# Patient Record
Sex: FEMALE | Race: WHITE | Employment: OTHER | URBAN - METROPOLITAN AREA
[De-identification: names, ages, dates, MRNs, and addresses within clinical notes are randomized per-mention and may not be internally consistent; named-entity substitution may affect disease eponyms.]

---

## 2017-10-30 ENCOUNTER — HOSPITAL ENCOUNTER (OUTPATIENT)
Dept: PREADMISSION TESTING | Age: 82
Discharge: HOME | End: 2017-10-30
Payer: MEDICARE

## 2017-10-30 VITALS — HEIGHT: 62 IN | BODY MASS INDEX: 33.13 KG/M2 | WEIGHT: 180 LBS

## 2017-10-30 LAB
ANION GAP SERPL CALC-SCNC: 10 MMOL/L (ref 3–18)
BUN SERPL-MCNC: 24 MG/DL (ref 7–18)
BUN/CREAT SERPL: 23 (ref 12–20)
CALCIUM SERPL-MCNC: 9.4 MG/DL (ref 8.5–10.1)
CHLORIDE SERPL-SCNC: 103 MMOL/L (ref 100–108)
CO2 SERPL-SCNC: 29 MMOL/L (ref 21–32)
CREAT SERPL-MCNC: 1.04 MG/DL (ref 0.6–1.3)
GLUCOSE SERPL-MCNC: 88 MG/DL (ref 74–99)
HBA1C MFR BLD: 6.6 % (ref 4.5–5.6)
HCT VFR BLD AUTO: 39 % (ref 35–45)
HGB BLD-MCNC: 12.4 G/DL (ref 12–16)
POTASSIUM SERPL-SCNC: 5.4 MMOL/L (ref 3.5–5.5)
SODIUM SERPL-SCNC: 142 MMOL/L (ref 136–145)

## 2017-10-30 PROCEDURE — 93005 ELECTROCARDIOGRAM TRACING: CPT

## 2017-10-30 PROCEDURE — 83036 HEMOGLOBIN GLYCOSYLATED A1C: CPT | Performed by: UROLOGY

## 2017-10-30 PROCEDURE — 85018 HEMOGLOBIN: CPT | Performed by: UROLOGY

## 2017-10-30 PROCEDURE — 80048 BASIC METABOLIC PNL TOTAL CA: CPT | Performed by: UROLOGY

## 2017-10-30 RX ORDER — CEFAZOLIN SODIUM 2 G/50ML
2 SOLUTION INTRAVENOUS ONCE
Status: CANCELLED | OUTPATIENT
Start: 2017-10-30 | End: 2017-10-30

## 2017-10-30 RX ORDER — SODIUM CHLORIDE, SODIUM LACTATE, POTASSIUM CHLORIDE, CALCIUM CHLORIDE 600; 310; 30; 20 MG/100ML; MG/100ML; MG/100ML; MG/100ML
125 INJECTION, SOLUTION INTRAVENOUS CONTINUOUS
Status: CANCELLED | OUTPATIENT
Start: 2017-10-30

## 2017-10-30 RX ORDER — LEVOTHYROXINE SODIUM 100 UG/1
100 TABLET ORAL
COMMUNITY

## 2017-10-30 RX ORDER — CALCIUM CARB/VITAMIN D3/VIT K1 500-500-40
1 TABLET,CHEWABLE ORAL 2 TIMES DAILY
COMMUNITY

## 2017-10-30 NOTE — PERIOP NOTES
Pt has sleep apnea and uses a CPAP. Instructed to bring in DOS. No removable prosthetic devices. Kwame kit given to patient with instructions. Reviewed Kwame wipes. No family history of MH. Pt does not meet criteria for special populations. Pt has alzheimers dementia.

## 2017-11-02 LAB
ATRIAL RATE: 58 BPM
CALCULATED P AXIS, ECG09: 48 DEGREES
CALCULATED R AXIS, ECG10: 24 DEGREES
CALCULATED T AXIS, ECG11: 72 DEGREES
DIAGNOSIS, 93000: NORMAL
P-R INTERVAL, ECG05: 196 MS
Q-T INTERVAL, ECG07: 442 MS
QRS DURATION, ECG06: 96 MS
QTC CALCULATION (BEZET), ECG08: 433 MS
VENTRICULAR RATE, ECG03: 58 BPM

## 2017-11-08 ENCOUNTER — ANESTHESIA EVENT (OUTPATIENT)
Dept: SURGERY | Age: 82
End: 2017-11-08
Payer: MEDICARE

## 2017-11-08 RX ORDER — FLUMAZENIL 0.1 MG/ML
0.2 INJECTION INTRAVENOUS
Status: CANCELLED | OUTPATIENT
Start: 2017-11-08

## 2017-11-08 RX ORDER — MAGNESIUM SULFATE 100 %
4 CRYSTALS MISCELLANEOUS AS NEEDED
Status: CANCELLED | OUTPATIENT
Start: 2017-11-08

## 2017-11-08 RX ORDER — NALOXONE HYDROCHLORIDE 0.4 MG/ML
0.1 INJECTION, SOLUTION INTRAMUSCULAR; INTRAVENOUS; SUBCUTANEOUS AS NEEDED
Status: CANCELLED | OUTPATIENT
Start: 2017-11-08

## 2017-11-08 RX ORDER — HYDROMORPHONE HYDROCHLORIDE 2 MG/ML
0.5 INJECTION, SOLUTION INTRAMUSCULAR; INTRAVENOUS; SUBCUTANEOUS
Status: CANCELLED | OUTPATIENT
Start: 2017-11-08

## 2017-11-08 RX ORDER — SODIUM CHLORIDE 0.9 % (FLUSH) 0.9 %
5-10 SYRINGE (ML) INJECTION AS NEEDED
Status: CANCELLED | OUTPATIENT
Start: 2017-11-08

## 2017-11-08 RX ORDER — DEXTROSE 50 % IN WATER (D50W) INTRAVENOUS SYRINGE
25-50 AS NEEDED
Status: CANCELLED | OUTPATIENT
Start: 2017-11-08

## 2017-11-08 RX ORDER — FENTANYL CITRATE 50 UG/ML
50 INJECTION, SOLUTION INTRAMUSCULAR; INTRAVENOUS AS NEEDED
Status: CANCELLED | OUTPATIENT
Start: 2017-11-08 | End: 2017-11-12

## 2017-11-09 ENCOUNTER — HOSPITAL ENCOUNTER (OUTPATIENT)
Age: 82
Setting detail: OUTPATIENT SURGERY
Discharge: HOME | End: 2017-11-09
Attending: UROLOGY | Admitting: UROLOGY
Payer: MEDICARE

## 2017-11-09 ENCOUNTER — APPOINTMENT (OUTPATIENT)
Dept: GENERAL RADIOLOGY | Age: 82
End: 2017-11-09
Attending: UROLOGY
Payer: MEDICARE

## 2017-11-09 ENCOUNTER — ANESTHESIA (OUTPATIENT)
Dept: SURGERY | Age: 82
End: 2017-11-09
Payer: MEDICARE

## 2017-11-09 VITALS
TEMPERATURE: 98.5 F | DIASTOLIC BLOOD PRESSURE: 53 MMHG | RESPIRATION RATE: 16 BRPM | HEART RATE: 50 BPM | OXYGEN SATURATION: 97 % | HEIGHT: 62 IN | BODY MASS INDEX: 33.13 KG/M2 | SYSTOLIC BLOOD PRESSURE: 126 MMHG | WEIGHT: 180.06 LBS

## 2017-11-09 LAB — GLUCOSE BLD STRIP.AUTO-MCNC: 110 MG/DL (ref 70–110)

## 2017-11-09 PROCEDURE — 82962 GLUCOSE BLOOD TEST: CPT

## 2017-11-09 PROCEDURE — 76060000034 HC ANESTHESIA 1.5 TO 2 HR: Performed by: UROLOGY

## 2017-11-09 PROCEDURE — C1894 INTRO/SHEATH, NON-LASER: HCPCS | Performed by: UROLOGY

## 2017-11-09 PROCEDURE — 77030010507 HC ADH SKN DERMBND J&J -B: Performed by: UROLOGY

## 2017-11-09 PROCEDURE — 77030012020 HC ANTENA NEURSTM MEDT -B: Performed by: UROLOGY

## 2017-11-09 PROCEDURE — 76010000153 HC OR TIME 1.5 TO 2 HR: Performed by: UROLOGY

## 2017-11-09 PROCEDURE — 74011250636 HC RX REV CODE- 250/636: Performed by: UROLOGY

## 2017-11-09 PROCEDURE — 74011000250 HC RX REV CODE- 250

## 2017-11-09 PROCEDURE — 74011250636 HC RX REV CODE- 250/636

## 2017-11-09 PROCEDURE — 77030018842 HC SOL IRR SOD CL 9% BAXT -A: Performed by: UROLOGY

## 2017-11-09 PROCEDURE — C1787 PATIENT PROGR, NEUROSTIM: HCPCS | Performed by: UROLOGY

## 2017-11-09 PROCEDURE — C1778 LEAD, NEUROSTIMULATOR: HCPCS | Performed by: UROLOGY

## 2017-11-09 PROCEDURE — 77030031139 HC SUT VCRL2 J&J -A: Performed by: UROLOGY

## 2017-11-09 PROCEDURE — 77030020782 HC GWN BAIR PAWS FLX 3M -B: Performed by: UROLOGY

## 2017-11-09 PROCEDURE — 76210000021 HC REC RM PH II 0.5 TO 1 HR: Performed by: UROLOGY

## 2017-11-09 PROCEDURE — 74011000250 HC RX REV CODE- 250: Performed by: UROLOGY

## 2017-11-09 PROCEDURE — C1767 GENERATOR, NEURO NON-RECHARG: HCPCS | Performed by: UROLOGY

## 2017-11-09 PROCEDURE — 77030011640 HC PAD GRND REM COVD -A: Performed by: UROLOGY

## 2017-11-09 DEVICE — GENERATOR INTERSTIM X --: Type: IMPLANTABLE DEVICE | Site: BUTTOCKS | Status: FUNCTIONAL

## 2017-11-09 DEVICE — LEAD KT STIM INTERSTIM 28CM --: Type: IMPLANTABLE DEVICE | Site: BUTTOCKS | Status: FUNCTIONAL

## 2017-11-09 RX ORDER — SODIUM CHLORIDE, SODIUM LACTATE, POTASSIUM CHLORIDE, CALCIUM CHLORIDE 600; 310; 30; 20 MG/100ML; MG/100ML; MG/100ML; MG/100ML
125 INJECTION, SOLUTION INTRAVENOUS CONTINUOUS
Status: DISCONTINUED | OUTPATIENT
Start: 2017-11-09 | End: 2017-11-09 | Stop reason: HOSPADM

## 2017-11-09 RX ORDER — PROPOFOL 10 MG/ML
INJECTION, EMULSION INTRAVENOUS
Status: DISCONTINUED | OUTPATIENT
Start: 2017-11-09 | End: 2017-11-09 | Stop reason: HOSPADM

## 2017-11-09 RX ORDER — SODIUM CHLORIDE 0.9 % (FLUSH) 0.9 %
5-10 SYRINGE (ML) INJECTION AS NEEDED
Status: CANCELLED | OUTPATIENT
Start: 2017-11-09

## 2017-11-09 RX ORDER — LIDOCAINE HYDROCHLORIDE 20 MG/ML
INJECTION, SOLUTION EPIDURAL; INFILTRATION; INTRACAUDAL; PERINEURAL AS NEEDED
Status: DISCONTINUED | OUTPATIENT
Start: 2017-11-09 | End: 2017-11-09 | Stop reason: HOSPADM

## 2017-11-09 RX ORDER — FENTANYL CITRATE 50 UG/ML
INJECTION, SOLUTION INTRAMUSCULAR; INTRAVENOUS AS NEEDED
Status: DISCONTINUED | OUTPATIENT
Start: 2017-11-09 | End: 2017-11-09 | Stop reason: HOSPADM

## 2017-11-09 RX ORDER — SODIUM CHLORIDE 0.9 % (FLUSH) 0.9 %
5-10 SYRINGE (ML) INJECTION EVERY 8 HOURS
Status: CANCELLED | OUTPATIENT
Start: 2017-11-09

## 2017-11-09 RX ORDER — CEFAZOLIN SODIUM 2 G/50ML
2 SOLUTION INTRAVENOUS ONCE
Status: COMPLETED | OUTPATIENT
Start: 2017-11-09 | End: 2017-11-09

## 2017-11-09 RX ORDER — MIDAZOLAM HYDROCHLORIDE 1 MG/ML
INJECTION, SOLUTION INTRAMUSCULAR; INTRAVENOUS AS NEEDED
Status: DISCONTINUED | OUTPATIENT
Start: 2017-11-09 | End: 2017-11-09 | Stop reason: HOSPADM

## 2017-11-09 RX ADMIN — PROPOFOL 25 MCG/KG/MIN: 10 INJECTION, EMULSION INTRAVENOUS at 07:38

## 2017-11-09 RX ADMIN — FENTANYL CITRATE 25 MCG: 50 INJECTION, SOLUTION INTRAMUSCULAR; INTRAVENOUS at 08:45

## 2017-11-09 RX ADMIN — SODIUM CHLORIDE, SODIUM LACTATE, POTASSIUM CHLORIDE, AND CALCIUM CHLORIDE 125 ML/HR: 600; 310; 30; 20 INJECTION, SOLUTION INTRAVENOUS at 06:08

## 2017-11-09 RX ADMIN — FENTANYL CITRATE 25 MCG: 50 INJECTION, SOLUTION INTRAMUSCULAR; INTRAVENOUS at 08:14

## 2017-11-09 RX ADMIN — MIDAZOLAM HYDROCHLORIDE 0.5 MG: 1 INJECTION, SOLUTION INTRAMUSCULAR; INTRAVENOUS at 08:43

## 2017-11-09 RX ADMIN — FENTANYL CITRATE 25 MCG: 50 INJECTION, SOLUTION INTRAMUSCULAR; INTRAVENOUS at 07:47

## 2017-11-09 RX ADMIN — MIDAZOLAM HYDROCHLORIDE 1 MG: 1 INJECTION, SOLUTION INTRAMUSCULAR; INTRAVENOUS at 07:38

## 2017-11-09 RX ADMIN — MIDAZOLAM HYDROCHLORIDE 0.5 MG: 1 INJECTION, SOLUTION INTRAMUSCULAR; INTRAVENOUS at 08:00

## 2017-11-09 RX ADMIN — FENTANYL CITRATE 25 MCG: 50 INJECTION, SOLUTION INTRAMUSCULAR; INTRAVENOUS at 08:43

## 2017-11-09 RX ADMIN — LIDOCAINE HYDROCHLORIDE 40 MG: 20 INJECTION, SOLUTION EPIDURAL; INFILTRATION; INTRACAUDAL; PERINEURAL at 07:33

## 2017-11-09 RX ADMIN — CEFAZOLIN SODIUM 2 G: 2 SOLUTION INTRAVENOUS at 07:33

## 2017-11-09 NOTE — H&P
A    Urology [address], [facility] Jessica Modi, [address] [place] [address]  phone: [phone]  fax: [phone]          Patient: Bal Flores  YOB: 1935  Date: 11/08/2017 7:10 AM   Visit Type: Chart Update      Assessment/Plan  # Detail Type Description    1. Assessment Urge incontinence (N39.41). Patient Plan   Pt underwent PNE for her urinary difficulties. She had very good results and wants to proceed with placement of Interstim device. All risks including pain infection bleeding reviewed. The need for battery replacement and possible need for lead revision in the future were explained to the patient. She understadns and wishes to proceed         2. Assessment Frequency of micturition (R35.0). 3. Assessment With fecal symptoms (R15.9), chronic. 4. Assessment Nocturnal enuresis (N39.44). This 81year old female presents for Overactive Bladder. History of Present Illness:  1. Overactive Bladder      Onset was gradual. Severity level is mild. It occurs daily. The problem is worse. Associated symptoms include nocturia (2 times per night), urgency and urinary frequency. Pertinent negatives include chills, constipation, fever, hematuria, pelvic pain, pelvic pressure, pressure, suprapubic pain and urinary incontinence. Additional information: Pt w hx of urgency urge incontinence, frequency, nocturia x 2, she uses only 2 depends, she is currently on Myrbetriq 50mg  Sxs have worsened over last few months.  having nocturnal enuresis now,  urge incontinence, and fecal incontinence. I reviewed Interstim as a possiblity since it can control both urine and fecal problems. Patient underwent a peripheral nerve evaluation in September 2017. This was successful and she wants to proceed with placement InterStim device.             Chronic conditions addressed today:  Diagnosis Description Code Status HPI Comments   With fecal symptoms R15.9 PAST MEDICAL/SURGICAL HISTORY   (Reviewed, updated)    Disease/disorder Onset Date Management Date Comments   Fall 2016 Rib fractures     Anemia due to hemorrage blood loss 2015 Blood transfusion       Cataract extraction 2016      Hole in Colon 10/01/2015      Gall Bladder       Appendectomy 1942      Knee replacement       DIAGNOSTICS HISTORY:  Test Ordered Interpretation Result completed   EYE EXAM & TREATMENT  No DM retinopathy See scanned report. 2016     Test Ordered Ordering Comments Modifier   EYE EXAM & TREATMENT          PROBLEM LIST:  Problem Description Onset Date   Sleep apnea    Diabetes    Arthritis    Mumps      Allergies  Ingredient Reaction Medication Name Comment   NO KNOWN ALLERGIES        Family History:  (Reviewed, updated)  Relationship Family Member Name  Age at Death Condition Onset Age Cause of Death       Family history of Diabetes mellitus  N       Family history of Hypertension  N   Father  Y 76 Kidney stones  N   Mother  Y       Spouse  Y 79            Social History:  (Reviewed, updated)  Preferred language is English. The patient does not need an . Smoking status: Never smoker. No passive smoke exposure. ALCOHOL  There is no history of alcohol use. LIFESTYLE  Moderate activity level. Exercises occasionally. Review of Systems  System Neg/Pos Details   Constitutional Negative Chills and fever. GI Negative Constipation.  Positive Nocturia, Urgency, Urinary frequency.  Negative Hematuria, pelvic pain, pelvic pressure, pressure, suprapubic pain and urinary incontinence. Physical Exam  Exam Findings Details   Constitutional Normal Well developed. Neck Exam Normal Inspection - Normal.   Respiratory Normal Inspection - Normal.   Abdomen Normal No abdominal tenderness. Genitourinary * Pelvic deferred. Genitourinary Normal No Suprapubic tenderness. No CVA tenderness.  No Flank mass Psychiatric Normal Oriented to time, place, person and situation. Appropriate mood and affect. Medications (added, continued, or stopped today)  Started Medication Directions Instruction Stopped   08/04/2017 BD Alcohol Swabs USE TO WIPE FINGER WHEN    TESTING BLOOD SUGARS     12/40/6118 Bystolic 10 mg tablet take 1 tablet by oral route  every day     08/04/2017 Calcium 600 600 mg calcium (1,500 mg) tablet take 2 by Oral route  every day     08/04/2017 CPAP  INHALATION GAS ABC     08/04/2017 Crestor 10 mg tablet take 1 tablet by oral route  every day     08/04/2017 escitalopram 10 mg tablet take 1 tablet by oral route  every day     08/04/2017 Exelon Patch 9.5 mg/24 hr transdermal apply 1 patch by transdermal route  every day . Do not apply to same area more than once every 14 days. 08/04/2017 furosemide 20 mg tablet take 1 by Oral route  every day     08/04/2017 Janumet 50 mg-1,000 mg tablet take 1 tablet by oral route 2 times every day with meals     08/04/2017 levothyroxine 100 mcg tablet take 1 tablet by oral route  every day     12/31/2015 Lumigan 0.01 % eye drops 1 drop daily     12/31/2015 MegaRed Omega-3 Krill Oil 1,000 mg-230 mg-60 mg capsule 1 tab po q daily. 08/04/2017 multivitamin capsule take 1 capsule by oral route  every day     08/04/2017 Myrbetriq 50 mg tablet,extended release take 1 tablet by oral route  every day swallowing whole with water. Do not crush, chew and/or divide. 08/04/2017 Namenda 10 mg tablet take 1 by Oral route 2 times every day     10/03/2016 NYSTATIN 328830     POW APPLY TO THE AFFECTED AREA 2 TIMES A DAY     08/04/2017 omeprazole 20 mg capsule,delayed release take 2 capsule by oral route  every day 30 minutes to 1 hour before a meal     08/04/2017 OneTouch Ultra Test strips USE TO TEST BLOOD SUGAR 1 TO 2 TIMES DAILY E11.65    01/14/2016 Refresh Tears 0.5 % eye drops use 1 drop daily     08/04/2017 spironolactone 25 mg tablet take . 5  tablet by ORAL route every day  07/26/2018   10/04/2016 Vitamin B-12 1,000 mcg/mL injection solution inject 1 milliliter by intramuscular route every 2 weeks for 2 months     08/04/2017 Voltaren 1 % topical gel apply (2G)  by topical route 3 times every day to the affected area(s)     Completed by:        Lilia Ventura  11/08/2017 7:13 AM   Document generated by:  Inés Schuster 11/08/2017 07:13 AM      -----------------------------------------------------------------------------------------------------------

## 2017-11-09 NOTE — OP NOTES
OPERATIVE NOTE - First and Second-Stage Interstim II Implantation with Programming    Patient: Nigel Canales MRN: [id number]  SSN: [id number]    YOB: 1935  Age: 81 y.o. Sex: female      Date of Procedure:  11/9/2017   Preoperative Diagnosis:  FREQUENCY OF MICTUITION  Postoperative Diagnosis:  FREQUENCY OF MICTUITION    Procedure:  Procedure(s):  INTERSTIM PLACEMENT STAGE 1 AND 2 W/C-ARM  Surgeon:  Tigre Garcia) and Role:     * Mickey Loredo MD - Primary  Anesthesia:  MAC     Findings:  Urinary frequency    Procedure Details:    After informed consent was obtained, the patient was taken to the operating room, placed in the prone position on the Milind table. The pt was prepped and draped in usual surgical fashion. Tape was placed on each buttock and pulled laterally to help in visualization of the anal sphincter. A  was used to confirm the patient was level. The patient was then prepped and draped in usual surgical fashion. The C-arm was moved into the lateral position to image the area of the sacral promontory to the coccyx. We then obtained an AP view to identify the superior medial aspect of S3. This was then marked on the skin level on the RIGHT side. Next, the skin was anesthetized with 0.25% Marcaine:1% Lidocaine without epinephrine. The anesthetic was placed at the skin insertion point and at the periosteal level for the lead insertion, as well as preparing the pocket for the neurostimulator. A foramen needle was then inserted at the marked position parallel to the foraminal line. It entered the S3 foramen without difficulty. Depth of the needle was confirmed with lateral fluoroscopy. Proper needle position was confirmed by direct observation of lifting of the perineum or \"bellowing\" and the observance of plantar flexion of the great toe using the test stimulator box. The needle stylet was removed, and a directional guide was placed, confirming its placement and depth on fluoroscopy.  The foramen needle was then removed. An incision was made laterally from the directional guide through the skin, and then a dilator and introducer sheath was placed over the directional guide and directed into the foramen, until the opaque marker of the dilator was seen at the midline the of the sacrum. The dilator obturator was unlocked and removed along with the directional guide. The 5757-18 tined lead with a curved stylet, was then placed through the introducer sheath so that lead 2 and 3 straddled the anterior margin of the bone. Position was confirmed by fluoroscopy. The lead was then further introduced, until three electrodes were visible below the sacrum, and one electrode within the sacrum. Each electrode was tested for visualization of paulette and plantar flexion of the great toe. We had good responses on all four leads both plantar flexion and paulette response. After satisfactory positioning was confirmed both AP and lateral views, the introducer sheath was retracted under continuous fluoroscopy, deploying the tined leads into the presacral tissue. Leads were then retested to confirm appropriate motor response. Next, attention was drawn to the placement of the InterStim II generator device. An incision was made in the subcutaneous tissue, posterior to the iliac crest to the depth of the gluteal fascia on the RIGHT side. Blunt, sharp and electrocautery dissection was used to create a pocket sufficient in volume to  the lead, subcutaneously, to the pocket site. The tunneling tool was removed, and the lead was fed through the tube and pulled out of the pocket site. The wound was copiously irrigated with antibiotic solution. The tined lead was dried and was inserted into the InterStim II generator, until the blue tip of the lead was visible in the window. A hex wrench was then used to tighten the set screw down and it was tugged on and felt to be secure.    The InterStim II generator was placed in the subcutaneous pocket and laid in nicely. Next, a blue towel was placed over the incision, and the programming head was placed over the implanted neurostimulator. The impedances were verified to be greater than 50 Ohms and less than 4000 Ohms. After implantation of the InterStim II generator was completed, the complex programming of the neurostimulator was performed. The wound was closed in 2 layers; a running 3-0 Vicryl for Avtars layer, and a running subcuticular 4-0 Vicryl for the skin. All layers were irrigated in between. Excellent hemostasis was obtained. The insertion site in the sacrum was also closed with a single 4-0 Vicryl suture. The wounds were then washed and dried. Dermabond was placed on them, and the procedure ended. All sponge, needle, and instrument counts were correct x2. The patient was placed in the supine position on the stretcher, transferred to the recovery room awake, alert, in stable condition. Estimated Blood Loss:  Minimal  Specimens:  * No specimens in log *   Implants:    Implant Name Type Inv.  Item Serial No.  Lot No. LRB No. Used Action   GENERATOR INTERSTIM II IPG --  - [id number]  GENERATOR INTERSTIM II IPG --  [id number] [employer] [facility]  N/A 1 Implanted   LEAD KT STIM INTERSTIM 28CM --  - [id number]   LEAD KT STIM INTERSTIM 28CM --    [employer] [id number] N/A 1 Implanted       Complications:  None    Lauren Cervantes MD  11/9/2017  9:23 AM

## 2017-11-09 NOTE — ANESTHESIA POSTPROCEDURE EVALUATION
Post-Anesthesia Evaluation and Assessment    Cardiovascular Function/Vital Signs  Visit Vitals    /70    Pulse 70    Temp 36.1 °C (97 °F)    Resp 14    Ht 5' 2\" (1.575 m)    Wt 81.7 kg (180 lb 1 oz)    SpO2 99%    BMI 32.93 kg/m2       Patient is status post Procedure(s):  INTERSTIM PLACEMENT STAGE 1 AND 2 W/C-ARM. Nausea/Vomiting: Controlled. Postoperative hydration reviewed and adequate. Pain:  Pain Scale 1: Numeric (0 - 10) (11/09/17 0532)  Pain Intensity 1: 0 (11/09/17 0532)   Managed. Neurological Status:   Neuro (WDL): Within Defined Limits (11/09/17 501)   At baseline. Mental Status and Level of Consciousness: Arousable. Pulmonary Status:   O2 Device: Room air (11/09/17 9801)   Adequate oxygenation and airway patent. Complications related to anesthesia: None    Post-anesthesia assessment completed. No concerns. Patient has met all discharge requirements.     Signed By: Patrick Quezada MD    November 9, 2017

## 2017-11-09 NOTE — ANESTHESIA PREPROCEDURE EVALUATION
Anesthetic History   No history of anesthetic complications            Review of Systems / Medical History  Patient summary reviewed, nursing notes reviewed and pertinent labs reviewed    Pulmonary        Sleep apnea: CPAP           Neuro/Psych         Psychiatric history and dementia     Cardiovascular    Hypertension: well controlled              Exercise tolerance: >4 METS     GI/Hepatic/Renal     GERD: well controlled           Endo/Other    Diabetes  Hypothyroidism  Obesity and arthritis  Pertinent negatives: No morbid obesity   Other Findings            Physical Exam    Airway  Mallampati: II  TM Distance: 4 - 6 cm  Neck ROM: normal range of motion   Mouth opening: Normal     Cardiovascular    Rhythm: regular  Rate: normal         Dental  No notable dental hx       Pulmonary  Breath sounds clear to auscultation               Abdominal  GI exam deferred       Other Findings            Anesthetic Plan    ASA: 3  Anesthesia type: MAC            Anesthetic plan and risks discussed with: Patient and Family

## 2017-11-09 NOTE — INTERVAL H&P NOTE
H&P Update:  Emory Keen was seen and examined. History and physical has been reviewed. The patient has been examined.  There have been no significant clinical changes since the completion of the originally dated History and Physical.    Signed By: Freddy Austin MD     November 9, 2017 7:03 AM

## 2017-11-09 NOTE — IP AVS SNAPSHOT
[address] [address] Patient: Nigel Canales MRN: [id number] [id number] About your hospitalization You were admitted on:  November 9, 2017 [facility] You were discharged on:  November 9, 2017 Why you were hospitalized Your primary diagnosis was:  Not on File Things You Need To Do (next 8 weeks) Follow up with Asia Patel MD  
  
Phone:  [phone] Where:  [facility], [facility] Discharge Orders None A check michi indicates which time of day the medication should be taken. My Medications TAKE these medications as instructed Instructions Each Dose to Equal  
 Morning Noon Evening Bedtime  
 escitalopram oxalate 10 mg tablet Commonly known as:  Wiljoshua Heads Your last dose was: Your next dose is: Take 10 mg by mouth daily. 10 mg EXELON 9.5 mg/24 hr patch Generic drug:  rivastigmine Your last dose was: Your next dose is:    
   
   
 1 Patch by TransDERmal route daily. 1 Patch  
    
   
   
   
  
 furosemide 20 mg tablet Commonly known as:  LASIX Your last dose was: Your next dose is: Take 1 Tab by mouth daily. 20 mg  
    
   
   
   
  
 JANUMET 50-1,000 mg per tablet Generic drug:  SITagliptin-metFORMIN Your last dose was: Your next dose is: Take 1 Tab by mouth two (2) times daily (with meals). 1 Tab  
    
   
   
   
  
 levothyroxine 100 mcg tablet Commonly known as:  SYNTHROID Your last dose was: Your next dose is: Take 100 mcg by mouth Daily (before breakfast). Indications: hypothyroidism 100 mcg  
    
   
   
   
  
 multivitamin tablet Commonly known as:  ONE A DAY Your last dose was: Your next dose is: Take 1 Tab by mouth daily. 1 Tab MYRBETRIQ 50 mg ER tablet Generic drug:  mirabegron ER Your last dose was: Your next dose is: Take 50 mg by mouth daily. Indications: Bladder Hyperactivity 50 mg  
    
   
   
   
  
 NAMENDA 10 mg tablet Generic drug:  memantine Your last dose was: Your next dose is: Take 10 mg by mouth two (2) times a day. 10 mg  
    
   
   
   
  
 nebivolol 10 mg tablet Commonly known as:  BYSTOLIC Your last dose was: Your next dose is: Take 10 mg by mouth daily. Indications: hypertension 10 mg  
    
   
   
   
  
 omega-3 fatty acids-vitamin e 1,000 mg Cap Your last dose was: Your next dose is: Take 3 Caps by mouth daily. 3 Cap  
    
   
   
   
  
 omeprazole 20 mg capsule Commonly known as:  PRILOSEC Your last dose was: Your next dose is: Take 20 mg by mouth daily. 20 mg  
    
   
   
   
  
 rosuvastatin 10 mg tablet Commonly known as:  CRESTOR Your last dose was: Your next dose is: Take 10 mg by mouth nightly. 10 mg  
    
   
   
   
  
 spironolactone 25 mg tablet Commonly known as:  ALDACTONE Your last dose was: Your next dose is: Take 12.5 mg by mouth daily. 12.5 mg  
    
   
   
   
  
 VESIcare 10 mg tablet Generic drug:  solifenacin Your last dose was: Your next dose is: Take 10 mg by mouth daily. 10 mg  
    
   
   
   
  
 VIACTIV [id number] mg-unit-mcg Chew Generic drug:  Calcium-Vitamin D3-Vitamin K Your last dose was: Your next dose is: Take 1 Tab by mouth two (2) times a day. Indications: PREVENTION OF VITAMIN D DEFICIENCY  
 1 Tab Discharge Instructions DISCHARGE SUMMARY from Nurse PATIENT INSTRUCTIONS: 
 
 After general anesthesia or intravenous sedation, for 24 hours or while taking prescription Narcotics: · Limit your activities · Do not drive and operate hazardous machinery · Do not make important personal or business decisions · Do  not drink alcoholic beverages · If you have not urinated within 8 hours after discharge, please contact your surgeon on call. Report the following to your surgeon: 
· Excessive pain, swelling, redness or odor of or around the surgical area · Temperature over 100.5 · Nausea and vomiting lasting longer than 4 hours or if unable to take medications · Any signs of decreased circulation or nerve impairment to extremity: change in color, persistent  numbness, tingling, coldness or increase pain · Any questions What to do at Home: 
Regular diet Keep dressing clean and dry for 48 hrs Shower in 48 hrs No tub baths for 1 month No heavy lifting Dr Rob Salazar will call regarding a post op check up If you experience any of the following symptoms heavy bleeding, unable to void fevers, severe pain,  please follow up with dr Rob Salazar *  Please give a list of your current medications to your Primary Care Provider. *  Please update this list whenever your medications are discontinued, doses are 
    changed, or new medications (including over-the-counter products) are added. *  Please carry medication information at all times in case of emergency situations. These are general instructions for a healthy lifestyle: No smoking/ No tobacco products/ Avoid exposure to second hand smoke Surgeon General's Warning:  Quitting smoking now greatly reduces serious risk to your health. Obesity, smoking, and sedentary lifestyle greatly increases your risk for illness A healthy diet, regular physical exercise & weight monitoring are important for maintaining a healthy lifestyle You may be retaining fluid if you have a history of heart failure or if you experience any of the following symptoms:  Weight gain of 3 pounds or more overnight or 5 pounds in a week, increased swelling in our hands or feet or shortness of breath while lying flat in bed. Please call your doctor as soon as you notice any of these symptoms; do not wait until your next office visit. Recognize signs and symptoms of STROKE: 
 
F-face looks uneven A-arms unable to move or move unevenly S-speech slurred or non-existent T-time-call 911 as soon as signs and symptoms begin-DO NOT go Back to bed or wait to see if you get better-TIME IS BRAIN. Warning Signs of HEART ATTACK Call 911 if you have these symptoms: 
? Chest discomfort. Most heart attacks involve discomfort in the center of the chest that lasts more than a few minutes, or that goes away and comes back. It can feel like uncomfortable pressure, squeezing, fullness, or pain. ? Discomfort in other areas of the upper body. Symptoms can include pain or discomfort in one or both arms, the back, neck, jaw, or stomach. ? Shortness of breath with or without chest discomfort. ? Other signs may include breaking out in a cold sweat, nausea, or lightheadedness. Don't wait more than five minutes to call [address]! Fast action can save your life. Calling 911 is almost always the fastest way to get lifesaving treatment. Emergency Medical Services staff can begin treatment when they arrive  up to an hour sooner than if someone gets to the hospital by car. The discharge information has been reviewed with the patient and caregiver. The patient and caregiver verbalized understanding. Discharge medications reviewed with the patient and caregiver and appropriate educational materials and side effects teaching were provided. ___________________________________________________________________________________________________________________________________ [facility] [address] [place] [address], Don [place]ens Office: [phone] Fax:    [phone] PROCEDURE: Procedure(s): INTERSTIM PLACEMENT STAGE 1 AND 2 W/C-ARM Notify [facility] IMMEDIATELY if any of the following occur: ? You are unable to urinate. Urgency to urinate is not uncommon. ? You find yourself urinating small frequent amounts associated with severe lower abdominal discomfort. ? Bright red blood with clots in the urine. Some reddish urine is not uncommon and should be treated with increasing the amount of fluids you drink. ? Temperature above 101.5° and / or chills. ? You are nauseous and / or vomiting and you cannot hold down any fluids. ? Your pain is not controlled with the pain medication prescribed. Special Considerations:  
 
? Do not drive for at least 24 hours after the procedure and until you are no longer taking narcotic pain medication and you are able to move and react without hesitation. MEDICATIONS: 
Pain     Norco®     Percocet®   Dilaudid® Antibiotics     Cipro     Ceftin®     Keflex     Bactrim DS® Urgency     Vesicare® Burning     Pyridium®      Sharlyne Adelina Nausea     Zofran®       Phenergan® Miscellaneous Prescriptions Written on Chart Prescriptions sent Electronically Our office will call you tomorrow to schedule your first follow-up appointment. Please contact [facility] at [phone] or go to the nearest Emergency Department / Urgent Care facility for any other medical questions or concerns. Patient armband removed and shredded Introducing [facility]! [facility] introduces [email or web address] patient portal. Now you can access parts of your medical record, email your doctor's office, and request medication refills online. 1. In your internet browser, go to [email or web address] 2. Click on the First Time User? Click Here link in the Sign In box.  You will see the New Member Sign Up page. 3. Enter your SpringSource Access Code exactly as it appears below. You will not need to use this code after youve completed the sign-up process. If you do not sign up before the expiration date, you must request a new code. · Spring[employer] Access Code: [email or web address] Expires: 1/24/2018  2:46 PM 
 
4. Enter the last four digits of your Social Security Number (xxxx) and Date of Birth (mm/dd/yyyy) as indicated and click Submit. You will be taken to the next sign-up page. 5. Create a SpringSource ID. This will be your SpringSource login ID and cannot be changed, so think of one that is secure and easy to remember. 6. Create a SpringSource password. You can change your password at any time. 7. Enter your Password Reset Question and Answer. This can be used at a later time if you forget your password. 8. Enter your e-mail address. You will receive e-mail notification when new information is available in [address]. 9. Click Sign Up. You can now view and download portions of your medical record. 10. Click the Download Summary menu link to download a portable copy of your medical information. If you have questions, please visit the Frequently Asked Questions section of the SpringSource website. Remember, SpringSource is NOT to be used for urgent needs. For medical emergencies, dial 911. Now available from your iPhone and Android! Unresulted Labs-Please follow up with your PCP about these lab tests Order Current Status [facility] TECHNOLOGIST SERVICE In process Providers Seen During Your Hospitalization Provider Specialty Primary office phone Freddy Austin MD Urology [phone] Your Primary Care Physician (PCP) Primary Care Physician Office Phone Office Fax Lilian Bookbinder [phone] You are allergic to the following No active allergies Recent Documentation Height Weight BMI OB Status Smoking Status 1.575 m 81.7 kg 32.93 kg/m2 Postmenopausal Never Smoker Emergency Contacts Name Discharge Info Relation Home Work Mobile Henry León DISCHARGE CAREGIVER [3] Other Relative [6]   [phone] Paulina León N/A  AT THIS TIME [6] Daughter [21] [phone] Patient Belongings The following personal items are in your possession at time of discharge: 
  Dental Appliances: None         Home Medications: None   Jewelry: None  Clothing: Footwear, Pants, Shirt, Socks, Sweater, Jacket/Coat, Undergarments (locker 5)    Other Valuables: Home Medical Equipment(comment) (CPAP on pt's bed) Please provide this summary of care documentation to your next provider. Signatures-by signing, you are acknowledging that this After Visit Summary has been reviewed with you and you have received a copy. Patient Signature:  ____________________________________________________________ Date:  ____________________________________________________________  
  
[place] Provider Signature:  ____________________________________________________________ Date:  ____________________________________________________________

## 2018-11-25 ENCOUNTER — HOSPITAL ENCOUNTER (EMERGENCY)
Age: 83
Discharge: HOME | End: 2018-11-25
Attending: EMERGENCY MEDICINE
Payer: MEDICARE

## 2018-11-25 ENCOUNTER — APPOINTMENT (OUTPATIENT)
Dept: CT IMAGING | Age: 83
End: 2018-11-25
Attending: EMERGENCY MEDICINE
Payer: MEDICARE

## 2018-11-25 VITALS
OXYGEN SATURATION: 98 % | HEART RATE: 63 BPM | TEMPERATURE: 97.8 F | SYSTOLIC BLOOD PRESSURE: 141 MMHG | BODY MASS INDEX: 33.63 KG/M2 | DIASTOLIC BLOOD PRESSURE: 62 MMHG | WEIGHT: 197 LBS | HEIGHT: 64 IN | RESPIRATION RATE: 18 BRPM

## 2018-11-25 DIAGNOSIS — R42 DIZZINESS: Primary | ICD-10-CM

## 2018-11-25 DIAGNOSIS — H65.01 ACUTE SEROUS OTITIS MEDIA OF RIGHT EAR WITHOUT RUPTURE: ICD-10-CM

## 2018-11-25 LAB
ALBUMIN SERPL-MCNC: 3.5 G/DL (ref 3.4–5)
ALBUMIN/GLOB SERPL: 0.9 {RATIO} (ref 0.8–1.7)
ALP SERPL-CCNC: 60 U/L (ref 45–117)
ALT SERPL-CCNC: 21 U/L (ref 13–56)
ANION GAP SERPL CALC-SCNC: 10 MMOL/L (ref 3–18)
AST SERPL-CCNC: 21 U/L (ref 15–37)
ATRIAL RATE: 56 BPM
BASOPHILS # BLD: 0 K/UL (ref 0–0.1)
BASOPHILS NFR BLD: 0 % (ref 0–2)
BILIRUB SERPL-MCNC: 0.3 MG/DL (ref 0.2–1)
BUN SERPL-MCNC: 18 MG/DL (ref 7–18)
BUN/CREAT SERPL: 15 (ref 12–20)
CALCIUM SERPL-MCNC: 8.7 MG/DL (ref 8.5–10.1)
CALCULATED P AXIS, ECG09: 64 DEGREES
CALCULATED R AXIS, ECG10: 43 DEGREES
CALCULATED T AXIS, ECG11: 71 DEGREES
CHLORIDE SERPL-SCNC: 101 MMOL/L (ref 100–108)
CO2 SERPL-SCNC: 29 MMOL/L (ref 21–32)
CREAT SERPL-MCNC: 1.2 MG/DL (ref 0.6–1.3)
DIAGNOSIS, 93000: NORMAL
DIFFERENTIAL METHOD BLD: ABNORMAL
EOSINOPHIL # BLD: 0.2 K/UL (ref 0–0.4)
EOSINOPHIL NFR BLD: 2 % (ref 0–5)
ERYTHROCYTE [DISTWIDTH] IN BLOOD BY AUTOMATED COUNT: 14.6 % (ref 11.6–14.5)
GLOBULIN SER CALC-MCNC: 3.7 G/DL (ref 2–4)
GLUCOSE SERPL-MCNC: 131 MG/DL (ref 74–99)
HCT VFR BLD AUTO: 37.4 % (ref 35–45)
HGB BLD-MCNC: 11.7 G/DL (ref 12–16)
LYMPHOCYTES # BLD: 1.2 K/UL (ref 0.9–3.6)
LYMPHOCYTES NFR BLD: 17 % (ref 21–52)
MCH RBC QN AUTO: 29 PG (ref 24–34)
MCHC RBC AUTO-ENTMCNC: 31.3 G/DL (ref 31–37)
MCV RBC AUTO: 92.6 FL (ref 74–97)
MONOCYTES # BLD: 0.4 K/UL (ref 0.05–1.2)
MONOCYTES NFR BLD: 5 % (ref 3–10)
NEUTS SEG # BLD: 5.6 K/UL (ref 1.8–8)
NEUTS SEG NFR BLD: 76 % (ref 40–73)
P-R INTERVAL, ECG05: 124 MS
PLATELET # BLD AUTO: 223 K/UL (ref 135–420)
PMV BLD AUTO: 9.4 FL (ref 9.2–11.8)
POTASSIUM SERPL-SCNC: 5.1 MMOL/L (ref 3.5–5.5)
PROT SERPL-MCNC: 7.2 G/DL (ref 6.4–8.2)
Q-T INTERVAL, ECG07: 438 MS
QRS DURATION, ECG06: 92 MS
QTC CALCULATION (BEZET), ECG08: 422 MS
RBC # BLD AUTO: 4.04 M/UL (ref 4.2–5.3)
SODIUM SERPL-SCNC: 140 MMOL/L (ref 136–145)
TROPONIN I SERPL-MCNC: <0.02 NG/ML (ref 0–0.06)
VENTRICULAR RATE, ECG03: 56 BPM
WBC # BLD AUTO: 7.4 K/UL (ref 4.6–13.2)

## 2018-11-25 PROCEDURE — 85025 COMPLETE CBC W/AUTO DIFF WBC: CPT

## 2018-11-25 PROCEDURE — 99284 EMERGENCY DEPT VISIT MOD MDM: CPT

## 2018-11-25 PROCEDURE — 84484 ASSAY OF TROPONIN QUANT: CPT

## 2018-11-25 PROCEDURE — 80053 COMPREHEN METABOLIC PANEL: CPT

## 2018-11-25 PROCEDURE — 93005 ELECTROCARDIOGRAM TRACING: CPT

## 2018-11-25 PROCEDURE — 70450 CT HEAD/BRAIN W/O DYE: CPT

## 2018-11-25 PROCEDURE — 74011250636 HC RX REV CODE- 250/636: Performed by: EMERGENCY MEDICINE

## 2018-11-25 RX ORDER — FLUTICASONE PROPIONATE 50 MCG
2 SPRAY, SUSPENSION (ML) NASAL DAILY
Qty: 1 BOTTLE | Refills: 0 | Status: SHIPPED | OUTPATIENT
Start: 2018-11-25

## 2018-11-25 RX ORDER — ONDANSETRON 8 MG/1
8 TABLET, ORALLY DISINTEGRATING ORAL
Qty: 12 TAB | Refills: 0 | Status: SHIPPED | OUTPATIENT
Start: 2018-11-25 | End: 2020-07-24

## 2018-11-25 RX ORDER — AMOXICILLIN AND CLAVULANATE POTASSIUM 875; 125 MG/1; MG/1
1 TABLET, FILM COATED ORAL 2 TIMES DAILY
Qty: 20 TAB | Refills: 0 | Status: SHIPPED | OUTPATIENT
Start: 2018-11-25 | End: 2018-12-05

## 2018-11-25 RX ORDER — MECLIZINE HYDROCHLORIDE 25 MG/1
TABLET ORAL
Qty: 20 TAB | Refills: 0 | Status: SHIPPED | OUTPATIENT
Start: 2018-11-25 | End: 2020-07-24

## 2018-11-25 RX ORDER — MECLIZINE HCL 12.5 MG 12.5 MG/1
25 TABLET ORAL
Status: COMPLETED | OUTPATIENT
Start: 2018-11-25 | End: 2018-11-25

## 2018-11-25 RX ADMIN — MECLIZINE 25 MG: 12.5 TABLET ORAL at 15:58

## 2018-11-25 NOTE — ED NOTES
I have reviewed discharge instructions with the patient and caregiver. The patient and caregiver verbalized understanding. Patient armband removed and shredded Current Discharge Medication List  
  
START taking these medications Details  
meclizine (ANTIVERT) 25 mg tablet Take 1 tablet by mouth every 6 hours for the next 3 days. Qty: 20 Tab, Refills: 0  
  
ondansetron (ZOFRAN ODT) 8 mg disintegrating tablet Take 1 Tab by mouth every eight (8) hours as needed for Nausea for up to 12 doses. Qty: 12 Tab, Refills: 0  
  
fluticasone (FLONASE) 50 mcg/actuation nasal spray 2 Sprays by Both Nostrils route daily. Qty: 1 Bottle, Refills: 0  
  
amoxicillin-clavulanate (AUGMENTIN) 875-125 mg per tablet Take 1 Tab by mouth two (2) times a day for 10 days. Qty: 20 Tab, Refills: 0 CONTINUE these medications which have NOT CHANGED Details Calcium-Vitamin D3-Vitamin K (VIACTIV) 324099-36 mg-unit-mcg chew Take 1 Tab by mouth two (2) times a day. Indications: PREVENTION OF VITAMIN D DEFICIENCY  
  
levothyroxine (SYNTHROID) 100 mcg tablet Take 100 mcg by mouth Daily (before breakfast). Indications: hypothyroidism  
  
mirabegron ER (MYRBETRIQ) 50 mg ER tablet Take 50 mg by mouth daily. Indications: Bladder Hyperactivity  
  
sitaGLIPtin-metFORMIN (JANUMET) 50-1,000 mg per tablet Take 1 Tab by mouth two (2) times daily (with meals). omeprazole (PRILOSEC) 20 mg capsule Take 20 mg by mouth daily. memantine (NAMENDA) 10 mg tablet Take 10 mg by mouth two (2) times a day. rivastigmine (EXELON) 9.5 mg/24 hr patch 1 Patch by TransDERmal route daily. furosemide (LASIX) 20 mg tablet Take 1 Tab by mouth daily. Qty: 30 Tab, Refills: 0  
  
escitalopram oxalate (LEXAPRO) 10 mg tablet Take 10 mg by mouth daily. multivitamin (ONE A DAY) tablet Take 1 Tab by mouth daily. nebivolol (BYSTOLIC) 10 mg tablet Take 10 mg by mouth daily. Indications: hypertension omega-3 fatty acids-vitamin e 1,000 mg cap Take 3 Caps by mouth daily. rosuvastatin (CRESTOR) 10 mg tablet Take 10 mg by mouth nightly. spironolactone (ALDACTONE) 25 mg tablet Take 12.5 mg by mouth daily.

## 2018-11-25 NOTE — ED NOTES
Pt able to ambulate without problem using a walker, no complaints of any dizziness, MD Joshi made aware.

## 2018-11-25 NOTE — DISCHARGE INSTRUCTIONS
Dizziness: Care Instructions  Your Care Instructions  Dizziness is the feeling of unsteadiness or fuzziness in your head. It is different than having vertigo, which is a feeling that the room is spinning or that you are moving or falling. It is also different from lightheadedness, which is the feeling that you are about to faint. It can be hard to know what causes dizziness. Some people feel dizzy when they have migraine headaches. Sometimes bouts of flu can make you feel dizzy. Some medical conditions, such as heart problems or high blood pressure, can make you feel dizzy. Many medicines can cause dizziness, including medicines for high blood pressure, pain, or anxiety. If a medicine causes your symptoms, your doctor may recommend that you stop or change the medicine. If it is a problem with your heart, you may need medicine to help your heart work better. If there is no clear reason for your symptoms, your doctor may suggest watching and waiting for a while to see if the dizziness goes away on its own. Follow-up care is a key part of your treatment and safety. Be sure to make and go to all appointments, and call your doctor if you are having problems. It's also a good idea to know your test results and keep a list of the medicines you take. How can you care for yourself at home? · If your doctor recommends or prescribes medicine, take it exactly as directed. Call your doctor if you think you are having a problem with your medicine. · Do not drive while you feel dizzy. · Try to prevent falls. Steps you can take include:  ? Using nonskid mats, adding grab bars near the tub, and using night-lights. ? Clearing your home so that walkways are free of anything you might trip on.  ? Letting family and friends know that you have been feeling dizzy. This will help them know how to help you. When should you call for help? Call 911 anytime you think you may need emergency care.  For example, call if:    · You passed out (lost consciousness).     · You have dizziness along with symptoms of a heart attack. These may include:  ? Chest pain or pressure, or a strange feeling in the chest.  ? Sweating. ? Shortness of breath. ? Nausea or vomiting. ? Pain, pressure, or a strange feeling in the back, neck, jaw, or upper belly or in one or both shoulders or arms. ? Lightheadedness or sudden weakness. ? A fast or irregular heartbeat.     · You have symptoms of a stroke. These may include:  ? Sudden numbness, tingling, weakness, or loss of movement in your face, arm, or leg, especially on only one side of your body. ? Sudden vision changes. ? Sudden trouble speaking. ? Sudden confusion or trouble understanding simple statements. ? Sudden problems with walking or balance. ? A sudden, severe headache that is different from past headaches.    Call your doctor now or seek immediate medical care if:    · You feel dizzy and have a fever, headache, or ringing in your ears.     · You have new or increased nausea and vomiting.     · Your dizziness does not go away or comes back.    Watch closely for changes in your health, and be sure to contact your doctor if:    · You do not get better as expected. Where can you learn more? Go to http://nat-ej.info/. Enter Y438 in the search box to learn more about \"Dizziness: Care Instructions. \"  Current as of: November 20, 2017  Content Version: 11.8  © 8480-3697 [employer]. Care instructions adapted under license by [employer] (which disclaims liability or warranty for this information). If you have questions about a medical condition or this instruction, always ask your healthcare professional. [place] [address] any warranty or liability for your use of this information. Middle Ear Fluid: Care Instructions  Your Care Instructions    Fluid often builds up inside the ear during a cold or allergies.  Usually the fluid drains away, but sometimes a small tube in the ear, called the eustachian tube, stays blocked for months. Symptoms of fluid buildup may include:  · Popping, ringing, or a feeling of fullness or pressure in the ear. · Trouble hearing. · Balance problems and dizziness. In most cases, you can treat yourself at home. Follow-up care is a key part of your treatment and safety. Be sure to make and go to all appointments, and call your doctor if you are having problems. It's also a good idea to know your test results and keep a list of the medicines you take. How can you care for yourself at home? · In most cases, the fluid clears up within a few months without treatment. You may need more tests if the fluid does not clear up after 3 months. · If your doctor prescribed antibiotics, take them as directed. Do not stop taking them just because you feel better. You need to take the full course of antibiotics. When should you call for help? Call your doctor now or seek immediate medical care if:    · You have symptoms of infection, such as:  ? Increased pain, swelling, warmth, or redness. ? Pus draining from the area. ? A fever.    Watch closely for changes in your health, and be sure to contact your doctor if:    · You notice changes in hearing.     · You do not get better as expected. Where can you learn more? Go to http://nat-ej.info/. Enter P955 in the search box to learn more about \"Middle Ear Fluid: Care Instructions. \"  Current as of: March 28, 2018  Content Version: 11.8  © 8888-3931 [employer]. Care instructions adapted under license by [employer] (which disclaims liability or warranty for this information). If you have questions about a medical condition or this instruction, always ask your healthcare professional. [address] any warranty or liability for your use of this information.

## 2018-11-25 NOTE — ED TRIAGE NOTES
Dizziness x 10 days.  has been seen by PMD for this and dx with sinus infection and placed on Amoxicillin. States has completed antibiotics without resolution of dizziness. States dizziness worse today. Pt denies any pain or other symptoms. Only reports worsening of dizziness today.

## 2018-11-25 NOTE — ED PROVIDER NOTES
EMERGENCY DEPARTMENT HISTORY AND PHYSICAL EXAM 
 
3:32 PM 
 
 
Date: 11/25/2018 Patient Name: Alina Mccray History of Presenting Illness Chief Complaint Patient presents with  Dizziness History Provided By: Patient Chief Complaint: Dizziness Duration:  10 days Timing:  Intermittent Location: Neuro Quality: \"room spinning\" Severity: Moderate Modifying Factors: Pt recently finished Amoxicillin without resolution of her dizziness. Associated Symptoms: denies any other associated signs or symptoms Additional History (Context): Alina Mccray is a 81 y.o. female with DM, HTN, Heart Failure, Anxiety/Depression, Arthritis, Alzheimer's dementia who presents with c/o moderate, intermittent \"room spinning\" dizziness onset 10 days. Pt notes she has had a few episodes of this dizziness but that today she had another episode that seemed slightly worse. She recently finished a prescription of Amoxicillin without resolution of her dizziness. Denies any recent fever, chills, CP, SOB, abdominal pain, nausea, vomiting, diarrhea, urinary sx and any associated signs or sx. No further concerns or complaints at this time. PCP: Abdirahman Oviedo MD 
 
Current Outpatient Medications Medication Sig Dispense Refill  sitaGLIPtin-metFORMIN (JANUMET) 50-1,000 mg per tablet Take 1 Tab by mouth two (2) times daily (with meals).  memantine (NAMENDA) 10 mg tablet Take 10 mg by mouth two (2) times a day.  rivastigmine (EXELON) 9.5 mg/24 hr patch 1 Patch by TransDERmal route daily.  furosemide (LASIX) 20 mg tablet Take 1 Tab by mouth daily. 30 Tab 0  
 Calcium-Vitamin D3-Vitamin K (VIACTIV) [id number] mg-unit-mcg chew Take 1 Tab by mouth two (2) times a day. Indications: PREVENTION OF VITAMIN D DEFICIENCY    
 levothyroxine (SYNTHROID) 100 mcg tablet Take 100 mcg by mouth Daily (before breakfast). Indications: hypothyroidism  mirabegron ER (MYRBETRIQ) 50 mg ER tablet Take 50 mg by mouth daily. Indications: Bladder Hyperactivity  solifenacin (VESICARE) 10 mg tablet Take 10 mg by mouth daily.  omeprazole (PRILOSEC) 20 mg capsule Take 20 mg by mouth daily.  escitalopram oxalate (LEXAPRO) 10 mg tablet Take 10 mg by mouth daily.  multivitamin (ONE A DAY) tablet Take 1 Tab by mouth daily.  nebivolol (BYSTOLIC) 10 mg tablet Take 10 mg by mouth daily. Indications: hypertension  omega-3 fatty acids-vitamin e 1,000 mg cap Take 3 Caps by mouth daily.  rosuvastatin (CRESTOR) 10 mg tablet Take 10 mg by mouth nightly.  spironolactone (ALDACTONE) 25 mg tablet Take 12.5 mg by mouth daily. Past History Past Medical History: 
Past Medical History:  
Diagnosis Date  Alzheimer's dementia  Arthritis   
 osteo  Diabetes ([facility] [address]  GERD (gastroesophageal reflux disease)  Heart failure (HCC)   
 history of  
 Hypercholesteremia  Hypertension  Ill-defined condition  Kidney stones  Morbid obesity ([facility] [address]  Psychiatric disorder   
 anxiety/depression  Sleep apnea   
 uses a CPAP- instructed to bring in DOS  Thyroid disease Past Surgical History: 
Past Surgical History:  
Procedure Laterality Date  COLONOSCOPY,DIAGNOSTIC  12/27/2015  HX APPENDECTOMY  HX CHOLECYSTECTOMY  HX GI    
 ruptured diverticulum, colon resection, colostomy with reversal  
 HX GI    
 admitted to [facility] after moving from [place], [place] and needed blood transfusions  HX KNEE REPLACEMENT Bilateral   
 
 
Family History: 
History reviewed. No pertinent family history. Social History: 
Social History Tobacco Use  Smoking status: Never Smoker  Smokeless tobacco: Never Used Substance Use Topics  Alcohol use: No  
 Drug use: No  
 
 
Allergies: 
No Known Allergies Review of Systems Review of Systems Constitutional: Negative for activity change, fatigue and fever. HENT: Negative for congestion and rhinorrhea. Eyes: Negative for visual disturbance. Respiratory: Negative for shortness of breath. Cardiovascular: Negative for chest pain and palpitations. Gastrointestinal: Negative for abdominal pain, diarrhea, nausea and vomiting. Genitourinary: Negative for dysuria and hematuria. Musculoskeletal: Negative for back pain. Skin: Negative for rash. Neurological: Positive for dizziness. Negative for tremors, syncope, weakness, light-headedness, numbness and headaches. All other systems reviewed and are negative. Physical Exam  
 
Visit Vitals /62 Pulse 60 Temp 97.7 °F (36.5 °C) Resp 20 Ht 5' 4\" (1.626 m) Wt 89.4 kg (197 lb) SpO2 96% BMI 33.81 kg/m² Physical Exam  
Constitutional: She is oriented to person, place, and time. She appears well-developed and well-nourished. No distress. HENT:  
Head: Normocephalic and atraumatic. Right Ear: External ear normal.  
Left Ear: External ear normal.  
Nose: Nose normal.  
Mouth/Throat: Oropharynx is clear and moist.  
Hearing aids intact, R TM dull with mild erythema, L TM mild dullness Eyes: Conjunctivae and EOM are normal. Pupils are equal, round, and reactive to light. No scleral icterus. Lying supine with the R ear to the bed increases dizziness that improves with the head in midline, sitting upright with the R ear to the bed, also, with dizziness, horizontal nystagmus noted Neck: Normal range of motion. Neck supple. No JVD present. No tracheal deviation present. No thyromegaly present. Cardiovascular: Normal rate, regular rhythm, normal heart sounds and intact distal pulses. Exam reveals no gallop and no friction rub. No murmur heard. Pulmonary/Chest: Effort normal and breath sounds normal. She exhibits no tenderness. Abdominal: Soft. Bowel sounds are normal. She exhibits no distension. There is no tenderness. There is no rebound and no guarding. Musculoskeletal: Normal range of motion. She exhibits no edema or tenderness. Lymphadenopathy:  
  She has no cervical adenopathy. Neurological: She is alert and oriented to person, place, and time. No cranial nerve deficit. Coordination normal.  
No arm or leg drift, heel to nose, finger to shin intact Skin: Skin is warm and dry. Psychiatric: She has a normal mood and affect. Her behavior is normal. Judgment and thought content normal.  
Nursing note and vitals reviewed. Diagnostic Study Results Labs - Recent Results (from the past 12 hour(s)) EKG, 12 LEAD, INITIAL Collection Time: 11/25/18  3:21 PM  
Result Value Ref Range Ventricular Rate 56 BPM  
 Atrial Rate 56 BPM  
 P-R Interval 124 ms QRS Duration 92 ms Q-T Interval 438 ms QTC Calculation (Bezet) 422 ms Calculated P Axis 64 degrees Calculated R Axis 43 degrees Calculated T Axis 71 degrees Diagnosis Sinus bradycardia with sinus arrhythmia Cannot rule out Anterior infarct , age undetermined Abnormal ECG When compared with ECG of 30-OCT-2017 11:28, No significant change was found Radiologic Studies - No orders to display Medical Decision Making I am the first provider for this patient. I reviewed the vital signs, available nursing notes, past medical history, past surgical history, family history and social history. Vital Signs-Reviewed the patient's vital signs. Pulse Oximetry Analysis -  96% on room air (Normal) Cardiac Monitor: 
Rate: 60 Rhythm:  Normal Sinus Rhythm EKG: Interpreted by the EP. Time Interpreted: 3:21 PM 
 Rate: 56 Rhythm: Sinus Bradycardia Interpretation: No STEMI Records Reviewed: Nursing Notes (Time of Review: 3:32 PM) 
 
ED Course: Progress Notes, Reevaluation, and Consults: 
5:40 PM: Pt ambulated well with her walker, she was steady with no difficulty. Pt's daughter was present at bedside, said that the pt was seen last week by her [place] [id number] doctor and given antibiotics and a different medication which she is not sure of the name which she completed. Mild dizziness improved with the medications but today it came back. I reviewed the labs with the pt and her daughter. Pt is better after Meclozine and ambulated without any light-headedness. Will give her another antibiotic as I feel some of this may be due to serous otitis media. Will prescribe Meclizine, Flonase and nausea medications and will have follow up with PCP and ENT. Provider Notes (Medical Decision Making): MDM Number of Diagnoses or Management Options Diagnosis management comments: Pt is an 82 yo female with a hx of hearing loss, depression, DM, thyroid disease, obesity presents with complaint of dizziness. Pt was having lightheadedness last week and was treated with amoxicillin for what she thought was a sinus infection. Pt today has dizziness with head movement but otherwise is nonfocal.  Will CT Head, follow cardiac labs, lytes, h/h, give meclizine then reevaluate. Tiffanie Gilbert, DO 4:56 PM 
 
 
 
Diagnosis Clinical Impression: 1. Dizziness 2. Acute serous otitis media of right ear without rupture Disposition: Home. Follow-up Information None Medication List  
  
ASK your doctor about these medications   
escitalopram oxalate 10 mg tablet Commonly known as:  Milana Scottie EXELON 9.5 mg/24 hr patch Generic drug:  rivastigmine 
  
furosemide 20 mg tablet Commonly known as:  LASIX Take 1 Tab by mouth daily. JANUMET 50-1,000 mg per tablet Generic drug:  SITagliptin-metFORMIN 
  
levothyroxine 100 mcg tablet Commonly known as:  SYNTHROID 
  
multivitamin tablet Commonly known as:  ONE A DAY 
  
MYRBETRIQ 50 mg ER tablet Generic drug:  mirabegron ER 
  
NAMENDA 10 mg tablet Generic drug:  memantine 
  
nebivolol 10 mg tablet Commonly known as:  BYSTOLIC 
  
omega-3 fatty acids-vitamin e 1,000 mg Cap 
  
omeprazole 20 mg capsule Commonly known as:  PRILOSEC 
  
rosuvastatin 10 mg tablet Commonly known as:  CRESTOR 
  
spironolactone 25 mg tablet Commonly known as:  ALDACTONE 
  
VESIcare 10 mg tablet Generic drug:  solifenacin Nicole Camargo [id number] mg-unit-mcg Chew Generic drug:  Calcium-Vitamin D3-Vitamin K 
  
  
 
_______________________________ Scribe Attestation Philadelphia Carrier acting as a scribe for and in the presence of Andres Yo MD     
November 25, 2018 at 3:32 PM 
    
Provider Attestation:     
I personally performed the services described in the documentation, reviewed the documentation, as recorded by the scribe in my presence, and it accurately and completely records my words and actions. November 25, 2018 at 3:32 PM - Andres Yo MD   
_______________________________

## 2020-07-24 ENCOUNTER — APPOINTMENT (OUTPATIENT)
Dept: CT IMAGING | Age: 85
End: 2020-07-24
Attending: EMERGENCY MEDICINE
Payer: MEDICARE

## 2020-07-24 ENCOUNTER — HOSPITAL ENCOUNTER (EMERGENCY)
Age: 85
Discharge: HOME | End: 2020-07-24
Attending: EMERGENCY MEDICINE
Payer: MEDICARE

## 2020-07-24 VITALS
HEART RATE: 74 BPM | RESPIRATION RATE: 18 BRPM | DIASTOLIC BLOOD PRESSURE: 61 MMHG | OXYGEN SATURATION: 98 % | SYSTOLIC BLOOD PRESSURE: 138 MMHG | TEMPERATURE: 98.9 F

## 2020-07-24 DIAGNOSIS — S30.0XXA LUMBAR CONTUSION, INITIAL ENCOUNTER: Primary | ICD-10-CM
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
## 2020-07-24 DIAGNOSIS — S30.1XXA CONTUSION OF ABDOMINAL WALL, INITIAL ENCOUNTER: ICD-10-CM

## 2020-07-24 LAB
ANION GAP SERPL CALC-SCNC: 0 MMOL/L (ref 3–18)
BASOPHILS # BLD: 0 K/UL (ref 0–0.1)
BASOPHILS NFR BLD: 0 % (ref 0–2)
BUN SERPL-MCNC: 28 MG/DL (ref 7–18)
BUN/CREAT SERPL: 25 (ref 12–20)
CALCIUM SERPL-MCNC: 8.8 MG/DL (ref 8.5–10.1)
CHLORIDE SERPL-SCNC: 105 MMOL/L (ref 100–111)
CO2 SERPL-SCNC: 30 MMOL/L (ref 21–32)
CREAT SERPL-MCNC: 1.12 MG/DL (ref 0.6–1.3)
DIFFERENTIAL METHOD BLD: ABNORMAL
EOSINOPHIL # BLD: 0.3 K/UL (ref 0–0.4)
EOSINOPHIL NFR BLD: 3 % (ref 0–5)
ERYTHROCYTE [DISTWIDTH] IN BLOOD BY AUTOMATED COUNT: 15.9 % (ref 11.6–14.5)
GLUCOSE SERPL-MCNC: 210 MG/DL (ref 74–99)
HCT VFR BLD AUTO: 36.9 % (ref 35–45)
HGB BLD-MCNC: 11.2 G/DL (ref 12–16)
LYMPHOCYTES # BLD: 1.2 K/UL (ref 0.9–3.6)
LYMPHOCYTES NFR BLD: 14 % (ref 21–52)
MCH RBC QN AUTO: 26.7 PG (ref 24–34)
MCHC RBC AUTO-ENTMCNC: 30.4 G/DL (ref 31–37)
MCV RBC AUTO: 88.1 FL (ref 74–97)
MONOCYTES # BLD: 0.4 K/UL (ref 0.05–1.2)
MONOCYTES NFR BLD: 5 % (ref 3–10)
NEUTS SEG # BLD: 6.7 K/UL (ref 1.8–8)
NEUTS SEG NFR BLD: 78 % (ref 40–73)
PLATELET # BLD AUTO: 213 K/UL (ref 135–420)
PMV BLD AUTO: 10.1 FL (ref 9.2–11.8)
POTASSIUM SERPL-SCNC: 4.3 MMOL/L (ref 3.5–5.5)
RBC # BLD AUTO: 4.19 M/UL (ref 4.2–5.3)
SODIUM SERPL-SCNC: 135 MMOL/L (ref 136–145)
TROPONIN I SERPL-MCNC: <0.02 NG/ML (ref 0–0.04)
WBC # BLD AUTO: 8.6 K/UL (ref 4.6–13.2)

## 2020-07-24 PROCEDURE — 99284 EMERGENCY DEPT VISIT MOD MDM: CPT

## 2020-07-24 PROCEDURE — 74176 CT ABD & PELVIS W/O CONTRAST: CPT

## 2020-07-24 PROCEDURE — 84484 ASSAY OF TROPONIN QUANT: CPT

## 2020-07-24 PROCEDURE — 93005 ELECTROCARDIOGRAM TRACING: CPT

## 2020-07-24 PROCEDURE — 72131 CT LUMBAR SPINE W/O DYE: CPT

## 2020-07-24 PROCEDURE — 85025 COMPLETE CBC W/AUTO DIFF WBC: CPT

## 2020-07-24 PROCEDURE — 80048 BASIC METABOLIC PNL TOTAL CA: CPT

## 2020-07-24 RX ORDER — FLUTICASONE FUROATE AND VILANTEROL TRIFENATATE 100; 25 UG/1; UG/1
POWDER RESPIRATORY (INHALATION)
COMMUNITY
Start: 2020-07-09

## 2020-07-24 NOTE — ED NOTES
María Claire is a 81 y.o. female that was discharged in stable condition. The patients diagnosis, condition and treatment were explained to  patient and aftercare instructions were given. The patient verbalized understanding. Patient to wait for her daughter to arrive for transportation .

## 2020-07-24 NOTE — ED PROVIDER NOTES
HPI patient says she was at home walking today when she suddenly felt dizzy lightheaded like she was going to pass out. She says she fell and on a carpeted floor. She says he was unable to get up because of her lower back hurting and also some moderate abdominal pain. EMS was contacted and they brought the patient to the emergency room. Patient denies any chest pain or shortness of breath. She says she has had similar episodes in the past where she felt lightheaded and dizzy when she was walking. She says is the first time she is ever fallen. She denies any head trauma or headache. She says she felt lightheaded and dizzy but she does not think she went unconscious. At the present time she is complaining of lower back pain and some mild pain in the mid abdominal area. She denies any nausea or vomiting and denies any changes in bowel or bladder habits. She says prior to her fall she was feeling fine in her normal state of health. No other complaints given at this time. Past Medical History:   Diagnosis Date    Alzheimer's dementia ([address]     Arthritis     osteo    Diabetes ([address]     GERD (gastroesophageal reflux disease)     Heart failure (HCC)     history of    Hypercholesteremia     Hypertension     Ill-defined condition     Kidney stones     Morbid obesity ([address]     Psychiatric disorder     anxiety/depression    Sleep apnea     uses a CPAP- instructed to bring in DOS    Thyroid disease        Past Surgical History:   Procedure Laterality Date    COLONOSCOPY,DIAGNOSTIC  12/27/2015         HX APPENDECTOMY      HX CHOLECYSTECTOMY      HX GI      ruptured diverticulum, colon resection, colostomy with reversal    HX GI      admitted to [facility] after moving from [place], [place] and needed blood transfusions    HX KNEE REPLACEMENT Bilateral          History reviewed. No pertinent family history.     Social History     Socioeconomic History    Marital status:      Spouse name: Not on file  Number of children: Not on file    Years of education: Not on file    Highest education level: Not on file   Occupational History    Not on file   Social Needs    Financial resource strain: Not on file    Food insecurity     Worry: Not on file     Inability: Not on file    Transportation needs     Medical: Not on file     Non-medical: Not on file   Tobacco Use    Smoking status: Never Smoker    Smokeless tobacco: Never Used   Substance and Sexual Activity    Alcohol use: No    Drug use: No    Sexual activity: Never   Lifestyle    Physical activity     Days per week: Not on file     Minutes per session: Not on file    Stress: Not on file   Relationships    Social connections     Talks on phone: Not on file     Gets together: Not on file     Attends Jainism service: Not on file     Active member of club or organization: Not on file     Attends meetings of clubs or organizations: Not on file     Relationship status: Not on file    Intimate partner violence     Fear of current or ex partner: Not on file     Emotionally abused: Not on file     Physically abused: Not on file     Forced sexual activity: Not on file   Other Topics Concern    Not on file   Social History Narrative    Not on file         ALLERGIES: Patient has no known allergies. Review of Systems   Constitutional: Negative. HENT: Negative. Eyes: Negative. Respiratory: Negative. Cardiovascular: Negative. Gastrointestinal: Positive for abdominal pain. Genitourinary: Negative. Musculoskeletal: Positive for back pain. Skin: Negative. Neurological: Positive for dizziness and weakness. Psychiatric/Behavioral: Negative. Vitals:    07/24/20 1522   BP: 161/61   Pulse: 76   Resp: 16   Temp: 98.9 °F (37.2 °C)   SpO2: 100%            Physical Exam  Vitals signs and nursing note reviewed. Constitutional:       Appearance: She is well-developed. HENT:      Head: Normocephalic and atraumatic.    Eyes: Conjunctiva/sclera: Conjunctivae normal.      Pupils: Pupils are equal, round, and reactive to light. Neck:      Musculoskeletal: Normal range of motion and neck supple. Cardiovascular:      Rate and Rhythm: Normal rate and regular rhythm. Pulmonary:      Effort: Pulmonary effort is normal.      Breath sounds: Normal breath sounds. Abdominal:      Palpations: Abdomen is soft. Tenderness: There is abdominal tenderness. Comments: Mild midepigastric and periumbilical tenderness palpation. No peritoneal signs. Musculoskeletal: Normal range of motion. General: Tenderness present. Comments: Mild diffuse tenderness in the mid lower lumbar spine area. No swelling or skin changes noted. Skin:     General: Skin is warm and dry. Neurological:      Mental Status: She is alert and oriented to person, place, and time. MDM         Procedures      EKG was read via the myself it 3:52 PM showing normal sinus rhythm at a rate of 76 with no acute changes. I reviewed the results of the ER evaluation with the patient. Her CAT scan of both the abdomen and lower back are reassuring for no significant injury. We will send her home with her daughter for expectant observation with Tylenol for pain as needed. She understands to return should symptoms change or worsen or not improving over the next 24 hours.   Jesus Sánchez MD 6:52 PM

## 2020-07-24 NOTE — DISCHARGE INSTRUCTIONS
Patient Education        Preventing Falls: Care Instructions  Your Care Instructions    Getting around your home safely can be a challenge if you have injuries or health problems that make it easy for you to fall. Loose rugs and furniture in walkways are among the dangers for many older people who have problems walking or who have poor eyesight. People who have conditions such as arthritis, osteoporosis, or dementia also have to be careful not to fall. You can make your home safer with a few simple measures. Follow-up care is a key part of your treatment and safety. Be sure to make and go to all appointments, and call your doctor if you are having problems. It's also a good idea to know your test results and keep a list of the medicines you take. How can you care for yourself at home? Taking care of yourself  · You may get dizzy if you do not drink enough water. To prevent dehydration, drink plenty of fluids, enough so that your urine is light yellow or clear like water. Choose water and other caffeine-free clear liquids. If you have kidney, heart, or liver disease and have to limit fluids, talk with your doctor before you increase the amount of fluids you drink. · Exercise regularly to improve your strength, muscle tone, and balance. Walk if you can. Swimming may be a good choice if you cannot walk easily. · Have your vision and hearing checked each year or any time you notice a change. If you have trouble seeing and hearing, you might not be able to avoid objects and could lose your balance. · Know the side effects of the medicines you take. Ask your doctor or pharmacist whether the medicines you take can affect your balance. Sleeping pills or sedatives can affect your balance. · Limit the amount of alcohol you drink. Alcohol can impair your balance and other senses. · Ask your doctor whether calluses or corns on your feet need to be removed.  If you wear loose-fitting shoes because of calluses or corns, you can lose your balance and fall. · Talk to your doctor if you have numbness in your feet. Preventing falls at home  · Remove raised doorway thresholds, throw rugs, and clutter. Repair loose carpet or raised areas in the floor. · Move furniture and electrical cords to keep them out of walking paths. · Use nonskid floor wax, and wipe up spills right away, especially on ceramic tile floors. · If you use a walker or cane, put rubber tips on it. If you use crutches, clean the bottoms of them regularly with an abrasive pad, such as steel wool. · Keep your house well lit, especially Lisa Dowse, and outside walkways. Use night-lights in areas such as hallways and bathrooms. Add extra light switches or use remote switches (such as switches that go on or off when you clap your hands) to make it easier to turn lights on if you have to get up during the night. · Install sturdy handrails on stairways. · Move items in your cabinets so that the things you use a lot are on the lower shelves (about waist level). · Keep a cordless phone and a flashlight with new batteries by your bed. If possible, put a phone in each of the main rooms of your house, or carry a cell phone in case you fall and cannot reach a phone. Or, you can wear a device around your neck or wrist. You push a button that sends a signal for help. · Wear low-heeled shoes that fit well and give your feet good support. Use footwear with nonskid soles. Check the heels and soles of your shoes for wear. Repair or replace worn heels or soles. · Do not wear socks without shoes on wood floors. · Walk on the grass when the sidewalks are slippery. If you live in an area that gets snow and ice in the winter, sprinkle salt on slippery steps and sidewalks. Preventing falls in the bath  · Install grab bars and nonskid mats inside and outside your shower or tub and near the toilet and sinks. · Use shower chairs and bath benches.   · Use a hand-held shower head that will allow you to sit while showering. · Get into a tub or shower by putting the weaker leg in first. Get out of a tub or shower with your strong side first.  · Repair loose toilet seats and consider installing a raised toilet seat to make getting on and off the toilet easier. · Keep your bathroom door unlocked while you are in the shower. Where can you learn more? Go to http://www.bowles.com/. Enter 0476 [id number] in the search box to learn more about \"Preventing Falls: Care Instructions. \"  Current as of: March 16, 2018  Content Version: 11.8  © 2036-9864 [employer]. Care instructions adapted under license by [employer] (which disclaims liability or warranty for this information). If you have questions about a medical condition or this instruction, always ask your healthcare professional. [place] [address] any warranty or liability for your use of this information. Patient Education        Low Back Contusion: Care Instructions  Your Care Instructions     Contusion is the medical term for a bruise. When you have a low back bruise, it's often caused by a direct blow or an impact, such as falling against a counter or table. Bruises are common sports injuries. Most people think of a bruise as a black-and-blue spot. This happens when small blood vessels get torn and leak blood under the skin. But bones, muscles, and organs can also get bruised. If these deep tissues are damaged, you may not always see a bruise. The doctor will examine your bruise. You may also have tests to make sure you do not have a more serious injury, such as a broken bone or nerve damage. Tests may include X-rays or other imaging tests like a CT scan or MRI. Low back bruises may cause pain and swelling. But if there is no serious damage, they will often get better with home treatment in several days to a few weeks.   The doctor has checked you carefully, but problems can develop later. If you notice any problems or new symptoms, get medical treatment right away. Follow-up care is a key part of your treatment and safety. Be sure to make and go to all appointments, and call your doctor if you are having problems. It's also a good idea to know your test results and keep a list of the medicines you take. How can you care for yourself at home? · Put ice or a cold pack on the sore area for 10 to 20 minutes at a time to stop swelling. Put a thin cloth between the ice pack and your skin. · Be safe with medicines. Read and follow all instructions on the label. ? If the doctor gave you a prescription medicine for pain, take it as prescribed. ? If you are not taking a prescription pain medicine, ask your doctor if you can take an over-the-counter medicine. · For the first day or two of pain, take it easy. But as soon as possible, get back to your normal daily life and activities. · Get gentle exercise, such as walking. Movement keeps your spine flexible and helps your muscles stay strong. When should you call for help? [id number] anytime you think you may need emergency care. For example, call if:  · You are unable to move a leg at all. Call your doctor now or seek immediate medical care if:  · You have new or worse symptoms in your legs or buttocks. Symptoms may include:  ? Numbness or tingling. ? Weakness. ? Pain. · You lose bladder or bowel control. · You have blood in your urine. Watch closely for changes in your health, and be sure to contact your doctor if:  · You do not get better as expected. Where can you learn more? Go to http://nat-ej.info/  Enter V337 in the search box to learn more about \"Low Back Contusion: Care Instructions. \"  Current as of: June 26, 2019               Content Version: 12.5  © 3959-2971 [employer].    Care instructions adapted under license by [employer] (which disclaims liability or warranty for this information). If you have questions about a medical condition or this instruction, always ask your healthcare professional. [place] [address] any warranty or liability for your use of this information.

## 2020-07-25 LAB
ATRIAL RATE: 76 BPM
CALCULATED P AXIS, ECG09: 69 DEGREES
CALCULATED R AXIS, ECG10: 14 DEGREES
CALCULATED T AXIS, ECG11: 84 DEGREES
DIAGNOSIS, 93000: NORMAL
P-R INTERVAL, ECG05: 178 MS
Q-T INTERVAL, ECG07: 416 MS
QRS DURATION, ECG06: 98 MS
QTC CALCULATION (BEZET), ECG08: 468 MS
VENTRICULAR RATE, ECG03: 76 BPM

## 2021-10-29 ENCOUNTER — APPOINTMENT (OUTPATIENT)
Dept: GENERAL RADIOLOGY | Age: 86
End: 2021-10-29
Attending: STUDENT IN AN ORGANIZED HEALTH CARE EDUCATION/TRAINING PROGRAM
Payer: MEDICARE

## 2021-10-29 ENCOUNTER — HOSPITAL ENCOUNTER (EMERGENCY)
Age: 86
Discharge: HOME | End: 2021-10-29
Attending: STUDENT IN AN ORGANIZED HEALTH CARE EDUCATION/TRAINING PROGRAM
Payer: MEDICARE

## 2021-10-29 VITALS
OXYGEN SATURATION: 98 % | HEART RATE: 72 BPM | TEMPERATURE: 98.1 F | DIASTOLIC BLOOD PRESSURE: 79 MMHG | RESPIRATION RATE: 18 BRPM | SYSTOLIC BLOOD PRESSURE: 175 MMHG

## 2021-10-29 DIAGNOSIS — Z13.9 ENCOUNTER FOR MEDICAL SCREENING EXAMINATION: ICD-10-CM

## 2021-10-29 DIAGNOSIS — E83.42 HYPOMAGNESEMIA: ICD-10-CM

## 2021-10-29 DIAGNOSIS — R82.90 ABNORMAL URINE ODOR: Primary | ICD-10-CM

## 2021-10-29 LAB
ANION GAP SERPL CALC-SCNC: 5 MMOL/L (ref 3–18)
APPEARANCE UR: CLEAR
BASOPHILS # BLD: 0 K/UL (ref 0–0.1)
BASOPHILS NFR BLD: 0 % (ref 0–2)
BILIRUB UR QL: NEGATIVE
BUN SERPL-MCNC: 16 MG/DL (ref 7–18)
BUN/CREAT SERPL: 18 (ref 12–20)
CALCIUM SERPL-MCNC: 8.5 MG/DL (ref 8.5–10.1)
CHLORIDE SERPL-SCNC: 102 MMOL/L (ref 100–111)
CO2 SERPL-SCNC: 30 MMOL/L (ref 21–32)
COLOR UR: YELLOW
CREAT SERPL-MCNC: 0.9 MG/DL (ref 0.6–1.3)
DIFFERENTIAL METHOD BLD: ABNORMAL
EOSINOPHIL # BLD: 0.2 K/UL (ref 0–0.4)
EOSINOPHIL NFR BLD: 3 % (ref 0–5)
ERYTHROCYTE [DISTWIDTH] IN BLOOD BY AUTOMATED COUNT: 14.5 % (ref 11.6–14.5)
GLUCOSE SERPL-MCNC: 136 MG/DL (ref 74–99)
GLUCOSE UR STRIP.AUTO-MCNC: NEGATIVE MG/DL
HCT VFR BLD AUTO: 37.7 % (ref 35–45)
HGB BLD-MCNC: 11.9 G/DL (ref 12–16)
HGB UR QL STRIP: NEGATIVE
KETONES UR QL STRIP.AUTO: NEGATIVE MG/DL
LEUKOCYTE ESTERASE UR QL STRIP.AUTO: NEGATIVE
LYMPHOCYTES # BLD: 2.1 K/UL (ref 0.9–3.6)
LYMPHOCYTES NFR BLD: 24 % (ref 21–52)
MAGNESIUM SERPL-MCNC: 1.4 MG/DL (ref 1.6–2.6)
MCH RBC QN AUTO: 27.3 PG (ref 24–34)
MCHC RBC AUTO-ENTMCNC: 31.6 G/DL (ref 31–37)
MCV RBC AUTO: 86.5 FL (ref 78–100)
MONOCYTES # BLD: 0.5 K/UL (ref 0.05–1.2)
MONOCYTES NFR BLD: 5 % (ref 3–10)
NEUTS SEG # BLD: 6.1 K/UL (ref 1.8–8)
NEUTS SEG NFR BLD: 68 % (ref 40–73)
NITRITE UR QL STRIP.AUTO: NEGATIVE
PH UR STRIP: 6.5 [PH] (ref 5–8)
PLATELET # BLD AUTO: 232 K/UL (ref 135–420)
PMV BLD AUTO: 10.3 FL (ref 9.2–11.8)
POTASSIUM SERPL-SCNC: 5.2 MMOL/L (ref 3.5–5.5)
PROT UR STRIP-MCNC: NEGATIVE MG/DL
RBC # BLD AUTO: 4.36 M/UL (ref 4.2–5.3)
SODIUM SERPL-SCNC: 137 MMOL/L (ref 136–145)
SP GR UR REFRACTOMETRY: 1 (ref 1–1.03)
UROBILINOGEN UR QL STRIP.AUTO: 0.2 EU/DL (ref 0.2–1)
WBC # BLD AUTO: 8.9 K/UL (ref 4.6–13.2)

## 2021-10-29 PROCEDURE — 80048 BASIC METABOLIC PNL TOTAL CA: CPT

## 2021-10-29 PROCEDURE — 74011250637 HC RX REV CODE- 250/637: Performed by: STUDENT IN AN ORGANIZED HEALTH CARE EDUCATION/TRAINING PROGRAM

## 2021-10-29 PROCEDURE — 83735 ASSAY OF MAGNESIUM: CPT

## 2021-10-29 PROCEDURE — 99283 EMERGENCY DEPT VISIT LOW MDM: CPT

## 2021-10-29 PROCEDURE — 85025 COMPLETE CBC W/AUTO DIFF WBC: CPT

## 2021-10-29 PROCEDURE — 81003 URINALYSIS AUTO W/O SCOPE: CPT

## 2021-10-29 PROCEDURE — 71046 X-RAY EXAM CHEST 2 VIEWS: CPT

## 2021-10-29 RX ORDER — LANOLIN ALCOHOL/MO/W.PET/CERES
400 CREAM (GRAM) TOPICAL ONCE
Status: COMPLETED | OUTPATIENT
Start: 2021-10-29 | End: 2021-10-29

## 2021-10-29 RX ADMIN — Medication 400 MG: at 16:10

## 2021-10-29 NOTE — ED TRIAGE NOTES
PT. AOX4 IN TRIAGE, HX OF DEMENTIA, ARRIVES WITH DAUGHTER, DAUGHTER STATES URINE HAS VERY STRONG ODOR, PT HAS NO SPECIFIC COMPLAINTS

## 2021-10-29 NOTE — ED PROVIDER NOTES
EMERGENCY DEPARTMENT HISTORY AND PHYSICAL EXAM      Date: (Not on file)  Patient Name: Chani Slade    History of Presenting Illness     Chief Complaint   Patient presents with    Urinary Odor       History (Context): Chani Slade is a 81 y.o. female with a past medical history significant for dementia, arthritis, diabetes, hypertension, hyperlipidemia, morbid obesity, and psychiatric disorder comes into the ED today due to possible UTI versus pneumonia. Patient brought to the emergency department with her daughter. Daughter states that over the past few months she has been having recurrent episodes of pneumonia with treatment of fluoroquinolones and penicillin. Patient continues to have a \"strong odor of pneumonia. \"  When further questioning daughter states that her urine has a very strong smell to it. Patient not complaining of any symptoms at this time however daughter states this is baseline secondary to her dementia. Daughter denies any fever, cough, increased work of breathing, or vomiting. Patients daughter denies taking any medication prior to arrival for treatment of her symptoms. She denies any alleviating or exacerbating factors for her symptoms. PCP: Sunitha Hawley MD    Current Outpatient Medications   Medication Sig Dispense Refill    Breo Ellipta 100-25 mcg/dose inhaler       fluticasone (FLONASE) 50 mcg/actuation nasal spray 2 Sprays by Both Nostrils route daily. 1 Bottle 0    Calcium-Vitamin D3-Vitamin K (VIACTIV) [phone] mg-unit-mcg chew Take 1 Tab by mouth two (2) times a day. Indications: PREVENTION OF VITAMIN D DEFICIENCY      levothyroxine (SYNTHROID) 100 mcg tablet Take 100 mcg by mouth Daily (before breakfast). Indications: hypothyroidism      mirabegron ER (MYRBETRIQ) 50 mg ER tablet Take 50 mg by mouth daily.  Indications: Bladder Hyperactivity      sitaGLIPtin-metFORMIN (JANUMET) 50-1,000 mg per tablet Take 1 Tab by mouth two (2) times daily (with meals).  omeprazole (PRILOSEC) 20 mg capsule Take 20 mg by mouth daily.  memantine (NAMENDA) 10 mg tablet Take 10 mg by mouth two (2) times a day.  rivastigmine (EXELON) 9.5 mg/24 hr patch 1 Patch by TransDERmal route daily.  furosemide (LASIX) 20 mg tablet Take 1 Tab by mouth daily. 30 Tab 0    escitalopram oxalate (LEXAPRO) 10 mg tablet Take 10 mg by mouth daily.  multivitamin (ONE A DAY) tablet Take 1 Tab by mouth daily.  nebivolol (BYSTOLIC) 10 mg tablet Take 10 mg by mouth daily. Indications: hypertension      omega-3 fatty acids-vitamin e 1,000 mg cap Take 3 Caps by mouth daily.  rosuvastatin (CRESTOR) 10 mg tablet Take 10 mg by mouth nightly.  spironolactone (ALDACTONE) 25 mg tablet Take 12.5 mg by mouth daily. Past History     Past Medical History:   Past Medical History:   Diagnosis Date    [facility]     Arthritis     osteo    Diabetes ([facility]     GERD (gastroesophageal reflux disease)     Heart failure (HCC)     history of    Hypercholesteremia     Hypertension     Ill-defined condition     Kidney stones     Morbid obesity ([facility] [facility]     Psychiatric disorder     anxiety/depression    Sleep apnea     uses a CPAP- instructed to bring in DOS    Thyroid disease        Past Surgical History:  Past Surgical History:   Procedure Laterality Date    COLONOSCOPY,DIAGNOSTIC  12/27/2015         HX APPENDECTOMY      HX CHOLECYSTECTOMY      HX GI      ruptured diverticulum, colon resection, colostomy with reversal    HX GI      admitted to [facility] after moving from [place], [place] and needed blood transfusions    HX KNEE REPLACEMENT Bilateral        Family History:  No family history on file.     Social History:   Social History     Tobacco Use    Smoking status: Never Smoker    Smokeless tobacco: Never Used   Substance Use Topics    Alcohol use: No    Drug use: No       Allergies:  No Known Allergies    PMH, PSH, family history, social history, allergies reviewed with the patient with significant items noted above. Review of Systems   Review of Systems   Constitutional: Negative for chills and fever. HENT: Negative for sore throat. Eyes: Negative for visual disturbance. Respiratory: Negative for shortness of breath. Cardiovascular: Negative for chest pain. Gastrointestinal: Negative for abdominal pain, nausea and vomiting. Genitourinary: Negative for difficulty urinating, dysuria, hematuria and urgency. Foul smelling urine     Musculoskeletal: Negative for myalgias. Skin: Negative for rash. Neurological: Negative for headaches. Physical Exam     Vitals:    10/29/21 1401   BP: (!) 175/79   Pulse: 72   Resp: 18   Temp: 98.1 °F (36.7 °C)   SpO2: 98%       Physical Exam  Vitals and nursing note reviewed. Constitutional:       General: She is not in acute distress. Appearance: Normal appearance. She is not ill-appearing or toxic-appearing. HENT:      Head: Normocephalic and atraumatic. Mouth/Throat:      Mouth: Mucous membranes are moist.   Eyes:      General: No scleral icterus. Conjunctiva/sclera: Conjunctivae normal.   Cardiovascular:      Rate and Rhythm: Normal rate and regular rhythm. Comments: Normal peripheral perfusion  Pulmonary:      Effort: Pulmonary effort is normal.      Breath sounds: Normal breath sounds. Abdominal:      General: There is no distension. Palpations: Abdomen is soft. Tenderness: There is no abdominal tenderness. Musculoskeletal:         General: No deformity. Normal range of motion. Cervical back: Normal range of motion and neck supple. Skin:     General: Skin is warm and dry. Findings: No rash. Neurological:      General: No focal deficit present. Mental Status: She is alert and oriented to person, place, and time. Mental status is at baseline.    Psychiatric:         Mood and Affect: Mood normal.         Behavior: Behavior normal. Diagnostic Study Results     Labs -   No results found for this or any previous visit (from the past 12 hour(s)). Labs Reviewed   CBC WITH AUTOMATED DIFF   METABOLIC PANEL, BASIC   MAGNESIUM   URINALYSIS W/ RFLX MICROSCOPIC       Radiologic Studies -   XR CHEST PA LAT    (Results Pending)     CT Results  (Last 48 hours)    None        CXR Results  (Last 48 hours)    None          The laboratory results, imaging results, and other diagnostic exams were reviewed in the EMR. Medical Decision Making   I am the first provider for this patient. I reviewed the vital signs, available nursing notes, past medical history, past surgical history, family history and social history. Vital Signs-Reviewed the patient's vital signs. Records Reviewed: Personally, on initial evaluation    MDM:   Chani Slade presents with complaint of possible UTI vs PNA  DDX includes but is not limited to: PNA, UTI, Medical screening exam    Patient overall well appearing, vitals grossly WNL, NAD. Will obtain lab work and imaging for further evaluation of patients complaint. Will continue to monitor and evaluate patient while in the ED. Orders as below:  Orders Placed This Encounter    XR CHEST PA LAT    CBC WITH AUTOMATED DIFF    BASIC METABOLIC PANEL    MAGNESIUM    URINALYSIS W/ RFLX MICROSCOPIC        ED Course:   ED Course as of Oct 29 1555   Fri Oct 29, 2021   1554 Patients CXR shows no consolidation but does show cardiac enlargement. Labs significant for magnesium 1.4, hemoglobin 11.9, otherwise labs grossly within normal limits. UA negative for infection. Will discharge patient home with return precautions and follow-up recommendations. Patient and daughter verbalized understanding and is in any further questions. [DV]      ED Course User Index  [DV] Fernanda Atkinson DO           Procedures:  Procedures        Diagnosis and Disposition     CLINICAL IMPRESSION:  No diagnosis found.   Current Discharge Medication List          Disposition: Home    Patient condition at time of disposition: Stable    DISCHARGE NOTE:   Pt has been reexamined. Patient has no new complaints, changes, or physical findings. Care plan outlined and precautions discussed. Results were reviewed with the patient. All medications were reviewed with the patient. All of pt's questions and concerns were addressed. Alarm symptoms and return precautions associated with chief complaint and evaluation were reviewed with the patient in detail. The patient demonstrated adequate understanding. Patient was instructed to follow up with PCP, as well as strict return precautions to the ED upon further deterioration. Patient is ready to go home. The patient is happy with this plan          Dragon Disclaimer     Please note that this dictation was completed with [employer], the computer voice recognition software. Quite often unanticipated grammatical, syntax, homophones, and other interpretive errors are inadvertently transcribed by the computer software. Please disregard these errors. Please excuse any errors that have escaped final proofreading. Lorrie BRENNAN

## 2024-06-13 ENCOUNTER — APPOINTMENT (OUTPATIENT)
Facility: HOSPITAL | Age: 89
DRG: 291 | End: 2024-06-13
Payer: MEDICARE

## 2024-06-13 ENCOUNTER — HOSPITAL ENCOUNTER (INPATIENT)
Facility: HOSPITAL | Age: 89
LOS: 6 days | Discharge: HOME | DRG: 291 | End: 2024-06-20
Attending: EMERGENCY MEDICINE | Admitting: INTERNAL MEDICINE
Payer: MEDICARE

## 2024-06-13 DIAGNOSIS — I50.9 ACUTE ON CHRONIC CONGESTIVE HEART FAILURE, UNSPECIFIED HEART FAILURE TYPE (HCC): Primary | ICD-10-CM

## 2024-06-13 DIAGNOSIS — F03.90 DEMENTIA, UNSPECIFIED DEMENTIA SEVERITY, UNSPECIFIED DEMENTIA TYPE, UNSPECIFIED WHETHER BEHAVIORAL, PSYCHOTIC, OR MOOD DISTURBANCE OR ANXIETY (HCC): ICD-10-CM

## 2024-06-13 DIAGNOSIS — J96.01 ACUTE HYPOXEMIC RESPIRATORY FAILURE (HCC): ICD-10-CM

## 2024-06-13 DIAGNOSIS — I50.9 ACUTE DECOMPENSATED HEART FAILURE (HCC): ICD-10-CM

## 2024-06-13 LAB
ALBUMIN SERPL-MCNC: 3.1 G/DL (ref 3.4–5)
ALBUMIN/GLOB SERPL: 0.9 (ref 0.8–1.7)
ALP SERPL-CCNC: 53 U/L (ref 45–117)
ALT SERPL-CCNC: 13 U/L (ref 13–56)
ANION GAP SERPL CALC-SCNC: 2 MMOL/L (ref 3–18)
AST SERPL-CCNC: 19 U/L (ref 10–38)
BASOPHILS # BLD: 0 K/UL (ref 0–0.1)
BASOPHILS NFR BLD: 1 % (ref 0–2)
BILIRUB SERPL-MCNC: 0.4 MG/DL (ref 0.2–1)
BUN SERPL-MCNC: 27 MG/DL (ref 7–18)
BUN/CREAT SERPL: 26 (ref 12–20)
CALCIUM SERPL-MCNC: 8.7 MG/DL (ref 8.5–10.1)
CHLORIDE SERPL-SCNC: 104 MMOL/L (ref 100–111)
CO2 SERPL-SCNC: 37 MMOL/L (ref 21–32)
CREAT SERPL-MCNC: 1.04 MG/DL (ref 0.6–1.3)
DIFFERENTIAL METHOD BLD: ABNORMAL
EOSINOPHIL # BLD: 0.2 K/UL (ref 0–0.4)
EOSINOPHIL NFR BLD: 2 % (ref 0–5)
ERYTHROCYTE [DISTWIDTH] IN BLOOD BY AUTOMATED COUNT: 16 % (ref 11.6–14.5)
GLOBULIN SER CALC-MCNC: 3.5 G/DL (ref 2–4)
GLUCOSE SERPL-MCNC: 146 MG/DL (ref 74–99)
HCT VFR BLD AUTO: 39.1 % (ref 35–45)
HGB BLD-MCNC: 11.3 G/DL (ref 12–16)
IMM GRANULOCYTES # BLD AUTO: 0 K/UL (ref 0–0.04)
IMM GRANULOCYTES NFR BLD AUTO: 0 % (ref 0–0.5)
LYMPHOCYTES # BLD: 1.8 K/UL (ref 0.9–3.6)
LYMPHOCYTES NFR BLD: 23 % (ref 21–52)
MCH RBC QN AUTO: 27.6 PG (ref 24–34)
MCHC RBC AUTO-ENTMCNC: 28.9 G/DL (ref 31–37)
MCV RBC AUTO: 95.4 FL (ref 78–100)
MONOCYTES # BLD: 0.4 K/UL (ref 0.05–1.2)
MONOCYTES NFR BLD: 5 % (ref 3–10)
NEUTS SEG # BLD: 5.5 K/UL (ref 1.8–8)
NEUTS SEG NFR BLD: 68 % (ref 40–73)
NRBC # BLD: 0 K/UL (ref 0–0.01)
NRBC BLD-RTO: 0 PER 100 WBC
NT PRO BNP: 1305 PG/ML (ref 0–1800)
PLATELET # BLD AUTO: 215 K/UL (ref 135–420)
PMV BLD AUTO: 9.6 FL (ref 9.2–11.8)
POTASSIUM SERPL-SCNC: 4.8 MMOL/L (ref 3.5–5.5)
PROT SERPL-MCNC: 6.6 G/DL (ref 6.4–8.2)
RBC # BLD AUTO: 4.1 M/UL (ref 4.2–5.3)
SODIUM SERPL-SCNC: 143 MMOL/L (ref 136–145)
TROPONIN I SERPL HS-MCNC: 17 NG/L (ref 0–54)
WBC # BLD AUTO: 8 K/UL (ref 4.6–13.2)

## 2024-06-13 PROCEDURE — 6370000000 HC RX 637 (ALT 250 FOR IP): Performed by: EMERGENCY MEDICINE

## 2024-06-13 PROCEDURE — 71045 X-RAY EXAM CHEST 1 VIEW: CPT

## 2024-06-13 PROCEDURE — 84484 ASSAY OF TROPONIN QUANT: CPT

## 2024-06-13 PROCEDURE — 99285 EMERGENCY DEPT VISIT HI MDM: CPT

## 2024-06-13 PROCEDURE — 85025 COMPLETE CBC W/AUTO DIFF WBC: CPT

## 2024-06-13 PROCEDURE — 93005 ELECTROCARDIOGRAM TRACING: CPT | Performed by: EMERGENCY MEDICINE

## 2024-06-13 PROCEDURE — 80053 COMPREHEN METABOLIC PANEL: CPT

## 2024-06-13 PROCEDURE — 83880 ASSAY OF NATRIURETIC PEPTIDE: CPT

## 2024-06-13 RX ORDER — IPRATROPIUM BROMIDE AND ALBUTEROL SULFATE 2.5; .5 MG/3ML; MG/3ML
1 SOLUTION RESPIRATORY (INHALATION) ONCE
Status: COMPLETED | OUTPATIENT
Start: 2024-06-13 | End: 2024-06-13

## 2024-06-13 RX ORDER — FUROSEMIDE 10 MG/ML
40 INJECTION INTRAMUSCULAR; INTRAVENOUS
Status: COMPLETED | OUTPATIENT
Start: 2024-06-14 | End: 2024-06-14

## 2024-06-13 RX ADMIN — IPRATROPIUM BROMIDE AND ALBUTEROL SULFATE 1 DOSE: .5; 3 SOLUTION RESPIRATORY (INHALATION) at 22:40

## 2024-06-14 ENCOUNTER — APPOINTMENT (OUTPATIENT)
Facility: HOSPITAL | Age: 89
DRG: 291 | End: 2024-06-14
Attending: INTERNAL MEDICINE
Payer: MEDICARE

## 2024-06-14 ENCOUNTER — APPOINTMENT (OUTPATIENT)
Facility: HOSPITAL | Age: 89
DRG: 291 | End: 2024-06-14
Payer: MEDICARE

## 2024-06-14 PROBLEM — I50.9 ACUTE ON CHRONIC CONGESTIVE HEART FAILURE, UNSPECIFIED HEART FAILURE TYPE (HCC): Status: ACTIVE | Noted: 2024-06-14

## 2024-06-14 PROBLEM — I50.9 ACUTE DECOMPENSATED HEART FAILURE (HCC): Status: ACTIVE | Noted: 2024-06-14

## 2024-06-14 LAB
AMMONIA PLAS-SCNC: 32 UMOL/L (ref 11–32)
ANION GAP SERPL CALC-SCNC: 7 MMOL/L (ref 3–18)
ARTERIAL PATENCY WRIST A: POSITIVE
BASE EXCESS BLD CALC-SCNC: 11 MMOL/L
BASOPHILS # BLD: 0 K/UL (ref 0–0.1)
BASOPHILS NFR BLD: 0 % (ref 0–2)
BDY SITE: ABNORMAL
BODY TEMPERATURE: 98.9
BUN SERPL-MCNC: 20 MG/DL (ref 7–18)
BUN/CREAT SERPL: 20 (ref 12–20)
CALCIUM SERPL-MCNC: 6.7 MG/DL (ref 8.5–10.1)
CHLORIDE SERPL-SCNC: 104 MMOL/L (ref 100–111)
CO2 SERPL-SCNC: 31 MMOL/L (ref 21–32)
CREAT SERPL-MCNC: 0.99 MG/DL (ref 0.6–1.3)
DIFFERENTIAL METHOD BLD: ABNORMAL
ECHO AO ASC DIAM: 3.7 CM
ECHO AO ASCENDING AORTA INDEX: 2.14 CM/M2
ECHO AO ROOT DIAM: 2.8 CM
ECHO AO ROOT INDEX: 1.62 CM/M2
ECHO AV AREA PEAK VELOCITY: 0.7 CM2
ECHO AV AREA VTI: 0.7 CM2
ECHO AV AREA/BSA PEAK VELOCITY: 0.4 CM2/M2
ECHO AV AREA/BSA VTI: 0.4 CM2/M2
ECHO AV MEAN GRADIENT: 29 MMHG
ECHO AV MEAN VELOCITY: 2.4 M/S
ECHO AV PEAK GRADIENT: 56 MMHG
ECHO AV PEAK VELOCITY: 3.9 M/S
ECHO AV VELOCITY RATIO: 0.23
ECHO AV VTI: 85.6 CM
ECHO BSA: 1.75 M2
ECHO EST RA PRESSURE: 8 MMHG
ECHO LA DIAMETER INDEX: 2.43 CM/M2
ECHO LA DIAMETER: 4.2 CM
ECHO LA TO AORTIC ROOT RATIO: 1.5
ECHO LA VOL A-L A2C: 60 ML (ref 22–52)
ECHO LA VOL A-L A4C: 68 ML (ref 22–52)
ECHO LA VOL BP: 61 ML (ref 22–52)
ECHO LA VOL MOD A2C: 57 ML (ref 22–52)
ECHO LA VOL MOD A4C: 62 ML (ref 22–52)
ECHO LA VOL/BSA BIPLANE: 35 ML/M2 (ref 16–34)
ECHO LA VOLUME AREA LENGTH: 66 ML
ECHO LA VOLUME INDEX A-L A2C: 35 ML/M2 (ref 16–34)
ECHO LA VOLUME INDEX A-L A4C: 39 ML/M2 (ref 16–34)
ECHO LA VOLUME INDEX AREA LENGTH: 38 ML/M2 (ref 16–34)
ECHO LA VOLUME INDEX MOD A2C: 33 ML/M2 (ref 16–34)
ECHO LA VOLUME INDEX MOD A4C: 36 ML/M2 (ref 16–34)
ECHO LV E' LATERAL VELOCITY: 10 CM/S
ECHO LV E' SEPTAL VELOCITY: 5 CM/S
ECHO LV FRACTIONAL SHORTENING: 36 % (ref 28–44)
ECHO LV INTERNAL DIMENSION DIASTOLE INDEX: 2.54 CM/M2
ECHO LV INTERNAL DIMENSION DIASTOLIC: 4.4 CM (ref 3.9–5.3)
ECHO LV INTERNAL DIMENSION SYSTOLIC INDEX: 1.62 CM/M2
ECHO LV INTERNAL DIMENSION SYSTOLIC: 2.8 CM
ECHO LV IVSD: 1.2 CM (ref 0.6–0.9)
ECHO LV MASS 2D: 158.2 G (ref 67–162)
ECHO LV MASS INDEX 2D: 91.5 G/M2 (ref 43–95)
ECHO LV POSTERIOR WALL DIASTOLIC: 0.9 CM (ref 0.6–0.9)
ECHO LV RELATIVE WALL THICKNESS RATIO: 0.41
ECHO LVOT AREA: 3.1 CM2
ECHO LVOT AV VTI INDEX: 0.23
ECHO LVOT DIAM: 2 CM
ECHO LVOT MEAN GRADIENT: 2 MMHG
ECHO LVOT PEAK GRADIENT: 3 MMHG
ECHO LVOT PEAK VELOCITY: 0.9 M/S
ECHO LVOT STROKE VOLUME INDEX: 36.5 ML/M2
ECHO LVOT SV: 63.1 ML
ECHO LVOT VTI: 20.1 CM
ECHO MV A VELOCITY: 1.12 M/S
ECHO MV E DECELERATION TIME (DT): 326.3 MS
ECHO MV E VELOCITY: 0.67 M/S
ECHO MV E/A RATIO: 0.6
ECHO MV E/E' LATERAL: 6.7
ECHO MV E/E' RATIO (AVERAGED): 10.05
ECHO MV E/E' SEPTAL: 13.4
ECHO PV MAX VELOCITY: 0.9 M/S
ECHO PV PEAK GRADIENT: 3 MMHG
ECHO RA VOLUME BIPLANE METHOD OF DISKS: 27 ML
ECHO RA VOLUME INDEX BP: 16 ML/M2
ECHO RA VOLUME: 27 ML
ECHO RA VOLUME: 27 ML
ECHO RIGHT VENTRICULAR SYSTOLIC PRESSURE (RVSP): 22 MMHG
ECHO RV BASAL DIMENSION: 3.7 CM
ECHO RV TAPSE: 2.2 CM (ref 1.7–?)
ECHO TV REGURGITANT MAX VELOCITY: 1.87 M/S
ECHO TV REGURGITANT PEAK GRADIENT: 14 MMHG
EKG ATRIAL RATE: 68 BPM
EKG DIAGNOSIS: NORMAL
EKG Q-T INTERVAL: 380 MS
EKG QRS DURATION: 100 MS
EKG QTC CALCULATION (BAZETT): 407 MS
EKG R AXIS: -45 DEGREES
EKG T AXIS: 59 DEGREES
EKG VENTRICULAR RATE: 69 BPM
EOSINOPHIL # BLD: 0.2 K/UL (ref 0–0.4)
EOSINOPHIL NFR BLD: 2 % (ref 0–5)
ERYTHROCYTE [DISTWIDTH] IN BLOOD BY AUTOMATED COUNT: 16.2 % (ref 11.6–14.5)
FOLATE SERPL-MCNC: >20 NG/ML (ref 3.1–17.5)
GAS FLOW.O2 O2 DELIVERY SYS: ABNORMAL
GLUCOSE BLD STRIP.AUTO-MCNC: 124 MG/DL (ref 70–110)
GLUCOSE BLD STRIP.AUTO-MCNC: 216 MG/DL (ref 70–110)
GLUCOSE SERPL-MCNC: 81 MG/DL (ref 74–99)
HCO3 BLD-SCNC: 38 MMOL/L (ref 22–26)
HCT VFR BLD AUTO: 35.5 % (ref 35–45)
HGB BLD-MCNC: 10.5 G/DL (ref 12–16)
IMM GRANULOCYTES # BLD AUTO: 0 K/UL (ref 0–0.04)
IMM GRANULOCYTES NFR BLD AUTO: 0 % (ref 0–0.5)
LACTATE BLD-SCNC: 0.62 MMOL/L (ref 0.4–2)
LYMPHOCYTES # BLD: 2.1 K/UL (ref 0.9–3.6)
LYMPHOCYTES NFR BLD: 31 % (ref 21–52)
MAGNESIUM SERPL-MCNC: 1.1 MG/DL (ref 1.6–2.6)
MCH RBC QN AUTO: 27.8 PG (ref 24–34)
MCHC RBC AUTO-ENTMCNC: 29.6 G/DL (ref 31–37)
MCV RBC AUTO: 93.9 FL (ref 78–100)
MONOCYTES # BLD: 0.3 K/UL (ref 0.05–1.2)
MONOCYTES NFR BLD: 5 % (ref 3–10)
NEUTS SEG # BLD: 4.1 K/UL (ref 1.8–8)
NEUTS SEG NFR BLD: 61 % (ref 40–73)
NRBC # BLD: 0 K/UL (ref 0–0.01)
NRBC BLD-RTO: 0 PER 100 WBC
O2/TOTAL GAS SETTING VFR VENT: 30 %
PCO2 BLD: 62.4 MMHG (ref 35–45)
PH BLD: 7.39 (ref 7.35–7.45)
PHOSPHATE SERPL-MCNC: 2.7 MG/DL (ref 2.5–4.9)
PLATELET # BLD AUTO: 186 K/UL (ref 135–420)
PMV BLD AUTO: 10.5 FL (ref 9.2–11.8)
PO2 BLD: 73 MMHG (ref 80–100)
POTASSIUM SERPL-SCNC: 4.1 MMOL/L (ref 3.5–5.5)
RBC # BLD AUTO: 3.78 M/UL (ref 4.2–5.3)
RESPIRATORY RATE, POC: 20 (ref 5–40)
SAO2 % BLD: 93.6 % (ref 92–97)
SERVICE CMNT-IMP: ABNORMAL
SODIUM SERPL-SCNC: 142 MMOL/L (ref 136–145)
SPECIMEN TYPE: ABNORMAL
T4 FREE SERPL-MCNC: 0.3 NG/DL (ref 0.7–1.5)
TROPONIN I SERPL HS-MCNC: 21 NG/L (ref 0–54)
TROPONIN I SERPL HS-MCNC: 35 NG/L (ref 0–54)
TROPONIN I SERPL HS-MCNC: 35 NG/L (ref 0–54)
TSH SERPL DL<=0.05 MIU/L-ACNC: 89.7 UIU/ML (ref 0.36–3.74)
VIT B12 SERPL-MCNC: 566 PG/ML (ref 211–911)
WBC # BLD AUTO: 6.8 K/UL (ref 4.6–13.2)

## 2024-06-14 PROCEDURE — 94761 N-INVAS EAR/PLS OXIMETRY MLT: CPT

## 2024-06-14 PROCEDURE — 93306 TTE W/DOPPLER COMPLETE: CPT

## 2024-06-14 PROCEDURE — 84443 ASSAY THYROID STIM HORMONE: CPT

## 2024-06-14 PROCEDURE — 6370000000 HC RX 637 (ALT 250 FOR IP): Performed by: HOSPITALIST

## 2024-06-14 PROCEDURE — 36600 WITHDRAWAL OF ARTERIAL BLOOD: CPT

## 2024-06-14 PROCEDURE — 99223 1ST HOSP IP/OBS HIGH 75: CPT | Performed by: INTERNAL MEDICINE

## 2024-06-14 PROCEDURE — 6360000002 HC RX W HCPCS: Performed by: INTERNAL MEDICINE

## 2024-06-14 PROCEDURE — 6370000000 HC RX 637 (ALT 250 FOR IP): Performed by: INTERNAL MEDICINE

## 2024-06-14 PROCEDURE — 83605 ASSAY OF LACTIC ACID: CPT

## 2024-06-14 PROCEDURE — 2580000003 HC RX 258: Performed by: INTERNAL MEDICINE

## 2024-06-14 PROCEDURE — 6360000002 HC RX W HCPCS: Performed by: EMERGENCY MEDICINE

## 2024-06-14 PROCEDURE — 83735 ASSAY OF MAGNESIUM: CPT

## 2024-06-14 PROCEDURE — 94640 AIRWAY INHALATION TREATMENT: CPT

## 2024-06-14 PROCEDURE — 84439 ASSAY OF FREE THYROXINE: CPT

## 2024-06-14 PROCEDURE — 82140 ASSAY OF AMMONIA: CPT

## 2024-06-14 PROCEDURE — 85025 COMPLETE CBC W/AUTO DIFF WBC: CPT

## 2024-06-14 PROCEDURE — 2580000003 HC RX 258: Performed by: HOSPITALIST

## 2024-06-14 PROCEDURE — 93010 ELECTROCARDIOGRAM REPORT: CPT | Performed by: INTERNAL MEDICINE

## 2024-06-14 PROCEDURE — 36415 COLL VENOUS BLD VENIPUNCTURE: CPT

## 2024-06-14 PROCEDURE — 80048 BASIC METABOLIC PNL TOTAL CA: CPT

## 2024-06-14 PROCEDURE — 82746 ASSAY OF FOLIC ACID SERUM: CPT

## 2024-06-14 PROCEDURE — 82803 BLOOD GASES ANY COMBINATION: CPT

## 2024-06-14 PROCEDURE — 6360000002 HC RX W HCPCS: Performed by: HOSPITALIST

## 2024-06-14 PROCEDURE — 93306 TTE W/DOPPLER COMPLETE: CPT | Performed by: INTERNAL MEDICINE

## 2024-06-14 PROCEDURE — 70450 CT HEAD/BRAIN W/O DYE: CPT

## 2024-06-14 PROCEDURE — 1100000003 HC PRIVATE W/ TELEMETRY

## 2024-06-14 PROCEDURE — 84100 ASSAY OF PHOSPHORUS: CPT

## 2024-06-14 PROCEDURE — 82607 VITAMIN B-12: CPT

## 2024-06-14 PROCEDURE — 2700000000 HC OXYGEN THERAPY PER DAY

## 2024-06-14 PROCEDURE — 84484 ASSAY OF TROPONIN QUANT: CPT

## 2024-06-14 PROCEDURE — 82962 GLUCOSE BLOOD TEST: CPT

## 2024-06-14 PROCEDURE — 94760 N-INVAS EAR/PLS OXIMETRY 1: CPT

## 2024-06-14 RX ORDER — POLYETHYLENE GLYCOL 3350 17 G/17G
17 POWDER, FOR SOLUTION ORAL DAILY PRN
Status: DISCONTINUED | OUTPATIENT
Start: 2024-06-14 | End: 2024-06-21 | Stop reason: HOSPADM

## 2024-06-14 RX ORDER — ROSUVASTATIN CALCIUM 10 MG/1
10 TABLET, COATED ORAL NIGHTLY
Status: DISCONTINUED | OUTPATIENT
Start: 2024-06-14 | End: 2024-06-21 | Stop reason: HOSPADM

## 2024-06-14 RX ORDER — FUROSEMIDE 10 MG/ML
40 INJECTION INTRAMUSCULAR; INTRAVENOUS DAILY
Status: DISCONTINUED | OUTPATIENT
Start: 2024-06-15 | End: 2024-06-16

## 2024-06-14 RX ORDER — LEVOTHYROXINE SODIUM 0.1 MG/1
100 TABLET ORAL
Status: DISCONTINUED | OUTPATIENT
Start: 2024-06-14 | End: 2024-06-16

## 2024-06-14 RX ORDER — FLUTICASONE PROPIONATE 50 MCG
2 SPRAY, SUSPENSION (ML) NASAL DAILY
Status: DISCONTINUED | OUTPATIENT
Start: 2024-06-14 | End: 2024-06-21 | Stop reason: HOSPADM

## 2024-06-14 RX ORDER — TROSPIUM CHLORIDE 20 MG/1
20 TABLET, FILM COATED ORAL NIGHTLY
Status: DISCONTINUED | OUTPATIENT
Start: 2024-06-14 | End: 2024-06-21 | Stop reason: HOSPADM

## 2024-06-14 RX ORDER — DEXTROSE MONOHYDRATE AND SODIUM CHLORIDE 5; .9 G/100ML; G/100ML
INJECTION, SOLUTION INTRAVENOUS CONTINUOUS
Status: DISCONTINUED | OUTPATIENT
Start: 2024-06-14 | End: 2024-06-15

## 2024-06-14 RX ORDER — MAGNESIUM SULFATE IN WATER 40 MG/ML
2000 INJECTION, SOLUTION INTRAVENOUS PRN
Status: DISCONTINUED | OUTPATIENT
Start: 2024-06-14 | End: 2024-06-21 | Stop reason: HOSPADM

## 2024-06-14 RX ORDER — METOPROLOL SUCCINATE 25 MG/1
25 TABLET, EXTENDED RELEASE ORAL DAILY
Status: DISCONTINUED | OUTPATIENT
Start: 2024-06-14 | End: 2024-06-21 | Stop reason: HOSPADM

## 2024-06-14 RX ORDER — ESCITALOPRAM OXALATE 10 MG/1
10 TABLET ORAL DAILY
Status: DISCONTINUED | OUTPATIENT
Start: 2024-06-14 | End: 2024-06-21 | Stop reason: HOSPADM

## 2024-06-14 RX ORDER — BUDESONIDE 0.5 MG/2ML
0.5 INHALANT ORAL
Status: DISCONTINUED | OUTPATIENT
Start: 2024-06-14 | End: 2024-06-21 | Stop reason: HOSPADM

## 2024-06-14 RX ORDER — SODIUM CHLORIDE 0.9 % (FLUSH) 0.9 %
5-40 SYRINGE (ML) INJECTION EVERY 12 HOURS SCHEDULED
Status: DISCONTINUED | OUTPATIENT
Start: 2024-06-14 | End: 2024-06-21 | Stop reason: HOSPADM

## 2024-06-14 RX ORDER — ACETAMINOPHEN 650 MG/1
650 SUPPOSITORY RECTAL EVERY 6 HOURS PRN
Status: DISCONTINUED | OUTPATIENT
Start: 2024-06-14 | End: 2024-06-21 | Stop reason: HOSPADM

## 2024-06-14 RX ORDER — SODIUM CHLORIDE 9 MG/ML
INJECTION, SOLUTION INTRAVENOUS PRN
Status: DISCONTINUED | OUTPATIENT
Start: 2024-06-14 | End: 2024-06-21 | Stop reason: HOSPADM

## 2024-06-14 RX ORDER — INSULIN LISPRO 100 [IU]/ML
0-4 INJECTION, SOLUTION INTRAVENOUS; SUBCUTANEOUS EVERY 6 HOURS
Status: DISCONTINUED | OUTPATIENT
Start: 2024-06-14 | End: 2024-06-15

## 2024-06-14 RX ORDER — LEVOTHYROXINE SODIUM ANHYDROUS 100 UG/5ML
100 INJECTION, POWDER, LYOPHILIZED, FOR SOLUTION INTRAVENOUS ONCE
Status: DISCONTINUED | OUTPATIENT
Start: 2024-06-14 | End: 2024-06-14

## 2024-06-14 RX ORDER — LEVOTHYROXINE SODIUM ANHYDROUS 100 UG/5ML
50 INJECTION, POWDER, LYOPHILIZED, FOR SOLUTION INTRAVENOUS DAILY
Status: DISCONTINUED | OUTPATIENT
Start: 2024-06-15 | End: 2024-06-14

## 2024-06-14 RX ORDER — POTASSIUM CHLORIDE 20 MEQ/1
40 TABLET, EXTENDED RELEASE ORAL PRN
Status: DISCONTINUED | OUTPATIENT
Start: 2024-06-14 | End: 2024-06-21 | Stop reason: HOSPADM

## 2024-06-14 RX ORDER — ONDANSETRON 4 MG/1
4 TABLET, ORALLY DISINTEGRATING ORAL EVERY 8 HOURS PRN
Status: DISCONTINUED | OUTPATIENT
Start: 2024-06-14 | End: 2024-06-21 | Stop reason: HOSPADM

## 2024-06-14 RX ORDER — SPIRONOLACTONE 25 MG/1
12.5 TABLET ORAL DAILY
Status: DISCONTINUED | OUTPATIENT
Start: 2024-06-14 | End: 2024-06-21 | Stop reason: HOSPADM

## 2024-06-14 RX ORDER — ONDANSETRON 2 MG/ML
4 INJECTION INTRAMUSCULAR; INTRAVENOUS EVERY 6 HOURS PRN
Status: DISCONTINUED | OUTPATIENT
Start: 2024-06-14 | End: 2024-06-21 | Stop reason: HOSPADM

## 2024-06-14 RX ORDER — FUROSEMIDE 10 MG/ML
40 INJECTION INTRAMUSCULAR; INTRAVENOUS 2 TIMES DAILY
Status: DISCONTINUED | OUTPATIENT
Start: 2024-06-14 | End: 2024-06-14

## 2024-06-14 RX ORDER — IPRATROPIUM BROMIDE AND ALBUTEROL SULFATE 2.5; .5 MG/3ML; MG/3ML
1 SOLUTION RESPIRATORY (INHALATION)
Status: DISCONTINUED | OUTPATIENT
Start: 2024-06-14 | End: 2024-06-15

## 2024-06-14 RX ORDER — ARFORMOTEROL TARTRATE 15 UG/2ML
15 SOLUTION RESPIRATORY (INHALATION)
Status: DISCONTINUED | OUTPATIENT
Start: 2024-06-14 | End: 2024-06-21 | Stop reason: HOSPADM

## 2024-06-14 RX ORDER — MEMANTINE HYDROCHLORIDE 10 MG/1
10 TABLET ORAL 2 TIMES DAILY
Status: DISCONTINUED | OUTPATIENT
Start: 2024-06-14 | End: 2024-06-21 | Stop reason: HOSPADM

## 2024-06-14 RX ORDER — ACETAMINOPHEN 325 MG/1
650 TABLET ORAL EVERY 6 HOURS PRN
Status: DISCONTINUED | OUTPATIENT
Start: 2024-06-14 | End: 2024-06-21 | Stop reason: HOSPADM

## 2024-06-14 RX ORDER — RIVASTIGMINE 9.5 MG/24H
1 PATCH, EXTENDED RELEASE TRANSDERMAL DAILY
Status: DISCONTINUED | OUTPATIENT
Start: 2024-06-14 | End: 2024-06-21 | Stop reason: HOSPADM

## 2024-06-14 RX ORDER — SODIUM CHLORIDE 0.9 % (FLUSH) 0.9 %
5-40 SYRINGE (ML) INJECTION PRN
Status: DISCONTINUED | OUTPATIENT
Start: 2024-06-14 | End: 2024-06-21 | Stop reason: HOSPADM

## 2024-06-14 RX ORDER — BUDESONIDE AND FORMOTEROL FUMARATE DIHYDRATE 160; 4.5 UG/1; UG/1
2 AEROSOL RESPIRATORY (INHALATION)
Status: DISCONTINUED | OUTPATIENT
Start: 2024-06-14 | End: 2024-06-14

## 2024-06-14 RX ORDER — POTASSIUM CHLORIDE 7.45 MG/ML
10 INJECTION INTRAVENOUS PRN
Status: DISCONTINUED | OUTPATIENT
Start: 2024-06-14 | End: 2024-06-21 | Stop reason: HOSPADM

## 2024-06-14 RX ADMIN — LEVOTHYROXINE SODIUM ANHYDROUS 100 MCG: 100 INJECTION, POWDER, LYOPHILIZED, FOR SOLUTION INTRAVENOUS at 17:36

## 2024-06-14 RX ADMIN — BUDESONIDE 500 MCG: 0.5 INHALANT RESPIRATORY (INHALATION) at 20:10

## 2024-06-14 RX ADMIN — FUROSEMIDE 40 MG: 10 INJECTION, SOLUTION INTRAMUSCULAR; INTRAVENOUS at 00:15

## 2024-06-14 RX ADMIN — LEVOTHYROXINE SODIUM ANHYDROUS 200 MCG: 100 INJECTION, POWDER, LYOPHILIZED, FOR SOLUTION INTRAVENOUS at 17:41

## 2024-06-14 RX ADMIN — SODIUM CHLORIDE, PRESERVATIVE FREE 10 ML: 5 INJECTION INTRAVENOUS at 21:45

## 2024-06-14 RX ADMIN — IPRATROPIUM BROMIDE AND ALBUTEROL SULFATE 1 DOSE: .5; 3 SOLUTION RESPIRATORY (INHALATION) at 20:10

## 2024-06-14 RX ADMIN — BUDESONIDE 500 MCG: 0.5 INHALANT RESPIRATORY (INHALATION) at 01:16

## 2024-06-14 RX ADMIN — DEXTROSE AND SODIUM CHLORIDE: 5; 900 INJECTION, SOLUTION INTRAVENOUS at 15:01

## 2024-06-14 RX ADMIN — ARFORMOTEROL TARTRATE 15 MCG: 15 SOLUTION RESPIRATORY (INHALATION) at 20:10

## 2024-06-14 RX ADMIN — HYDROCORTISONE SODIUM SUCCINATE 100 MG: 100 INJECTION, POWDER, FOR SOLUTION INTRAMUSCULAR; INTRAVENOUS at 21:45

## 2024-06-14 RX ADMIN — IPRATROPIUM BROMIDE AND ALBUTEROL SULFATE 1 DOSE: .5; 3 SOLUTION RESPIRATORY (INHALATION) at 13:49

## 2024-06-14 RX ADMIN — IPRATROPIUM BROMIDE AND ALBUTEROL SULFATE 1 DOSE: .5; 3 SOLUTION RESPIRATORY (INHALATION) at 08:17

## 2024-06-14 RX ADMIN — ROSUVASTATIN CALCIUM 10 MG: 10 TABLET, COATED ORAL at 01:14

## 2024-06-14 RX ADMIN — MEMANTINE HYDROCHLORIDE 10 MG: 10 TABLET ORAL at 01:14

## 2024-06-14 RX ADMIN — ARFORMOTEROL TARTRATE 15 MCG: 15 SOLUTION RESPIRATORY (INHALATION) at 08:18

## 2024-06-14 RX ADMIN — INSULIN LISPRO 1 UNITS: 100 INJECTION, SOLUTION INTRAVENOUS; SUBCUTANEOUS at 19:06

## 2024-06-14 RX ADMIN — MAGNESIUM SULFATE HEPTAHYDRATE 3000 MG: 500 INJECTION, SOLUTION INTRAMUSCULAR; INTRAVENOUS at 17:42

## 2024-06-14 RX ADMIN — BUDESONIDE 500 MCG: 0.5 INHALANT RESPIRATORY (INHALATION) at 08:18

## 2024-06-14 RX ADMIN — HYDROCORTISONE SODIUM SUCCINATE 100 MG: 100 INJECTION, POWDER, FOR SOLUTION INTRAMUSCULAR; INTRAVENOUS at 15:03

## 2024-06-14 RX ADMIN — ARFORMOTEROL TARTRATE 15 MCG: 15 SOLUTION RESPIRATORY (INHALATION) at 01:16

## 2024-06-14 ASSESSMENT — PAIN SCALES - GENERAL
PAINLEVEL_OUTOF10: 0

## 2024-06-14 NOTE — CARE COORDINATION
06/14/24 1553   Discharge Planning   Living Arrangements Other (Comment)  (Pt resides at [facility]   Support Systems Children   Current DME Prior to Arrival Other (Comment)  (Pt is not ambulatory)   Potential Assistance Needed N/A   Potential DME Needed Wheelchair;Other (Comment)  (Any DME needed)   Type of Home Care Services None   Patient expects to be discharged to: Long-term care       SW contacted Oralia Tolentino, [phone] regarding her mother, pt No Alberts. Ms. Tolentino reported Pt resides at [facility] full time, and will be returning to [facility] upon d/c. Ms. Tolentino reported pt would need transportation back to the facility. Ms. Tolentino continued pt is not ambulatory.     Ms. Tolentino reported Pt is apart of the [facility] program with VLADIMIR WOOD. Ms. Tolentino clarified that Pt does not see the listed PCP,  Kerry Booker.     Paola Fall MSW  Case Management Department

## 2024-06-14 NOTE — ED NOTES
TRANSFER - OUT REPORT:    Verbal report given to BROOKLYNN Potts on No Alberts  being transferred to  for routine progression of patient care       Report consisted of patient's Situation, Background, Assessment and   Recommendations(SBAR).     Information from the following report(s) ED Encounter Summary, ED SBAR, MAR, and Recent Results was reviewed with the receiving nurse.    [place] Assessment:    Presents to emergency department  because of falls (Syncope, seizure, or loss of consciousness): No  Age > 70: Yes  Altered Mental Status, Intoxication with alcohol or substance confusion (Disorientation, impaired judgment, poor safety awaremess, or inability to follow instructions): Yes  Impaired Mobility: Ambulates or transfers with assistive devices or assistance; Unable to ambulate or transer.: Yes  Nursing Judgement: Yes          Lines:   Peripheral IV 06/13/24 Distal;Left Forearm (Active)   Site Assessment Clean, dry & intact 06/13/24 2233   Line Status Blood return noted;Flushed 06/13/24 2233   Phlebitis Assessment No symptoms 06/13/24 2233   Infiltration Assessment 0 06/13/24 2233   Dressing Status New dressing applied 06/13/24 2233   Dressing Type Transparent 06/13/24 2233        Opportunity for questions and clarification was provided.      Patient transported with:  O2 @ 2lpm

## 2024-06-14 NOTE — ED PROVIDER NOTES
[facility] EMERGENCY DEPT  eMERGENCY dEPARTMENT eNCOUnter        Pt Name: No Alberts  MRN: [id number]  Birthdate 1935  Date of evaluation: 6/13/2024  Provider: Rene Flanagan MD  PCP: Kerry Booker MD  Note Started: 10:31 PM EDT 6/14/2024          CHIEF COMPLAINT       Chief Complaint   Patient presents with    Shortness of Breath       HISTORY OF PRESENT ILLNESS        Patient sent from the skilled nursing facility for concerns of shortness of breath with low oxygen saturation of 90%.  She did receive a breathing treatment at the facility without improvement prompting her to be sent to the emergency department.  Patient has a history of congestive heart failure as well as COPD.  She has dementia and is unable to provide any significant history for me.    Nursing Notes were all reviewed and agreed with or any disagreements were addressed  in the HPI.    REVIEW OF SYSTEMS         Not available due to dementia      PASTMEDICAL HISTORY     Past Medical History:   Diagnosis Date    Alzheimer's dementia (HCC)     Arthritis     osteo    Diabetes (HCC)     GERD (gastroesophageal reflux disease)     Heart failure (HCC)     history of    Hypercholesteremia     Hypertension     Ill-defined condition     Kidney stones     Morbid obesity (HCC)     Psychiatric disorder     anxiety/depression    Sleep apnea     uses a CPAP- instructed to bring in DOS    Thyroid disease          SURGICAL HISTORY       Past Surgical History:   Procedure Laterality Date    APPENDECTOMY      CHOLECYSTECTOMY      COLONOSCOPY,DIAGNOSTIC  12/27/2015         GI      admitted to [facility] after moving from [place], [place] and needed blood transfusions    GI      ruptured diverticulum, colon resection, colostomy with reversal    TOTAL KNEE ARTHROPLASTY Bilateral          CURRENT MEDICATIONS       Previous Medications    ESCITALOPRAM (LEXAPRO) 10 MG TABLET    Take 10 mg by mouth daily    FLUTICASONE (FLONASE) 50 MCG/ACT NASAL SPRAY    2 sprays  consistent with congestive heart failure []   2358 My independent review of the EKG is sinus rhythm.  Left axis deviation.  QRS borderline at 100.  Significant motion artifact but no marked ST-T wave changes. []   Fri Jun 14, 2024   0002 Clinical picture consistent with acute on chronic congestive heart failure with hypoxemic respiratory failure.  Stable now on 2 L nasal cannula.  I discussed case with the hospitalist for admission.  Given 40 mg of Lasix IV, twice her oral dose.  Admission for further assessment [SH]      ED Course User Index  [] Rene Flanagan MD       PROCEDURES   Unless otherwise noted below, none     Procedures    CRITICAL CARE TIME   N/A    CONSULTS:  None    EMERGENCY DEPARTMENT COURSE and DIFFERENTIAL DIAGNOSIS/MDM:   Vitals:    Vitals:    06/13/24 2327 06/13/24 2328 06/13/24 2330 06/13/24 2345   BP:   (!) 154/61 107/63   Pulse: 73 74 66 80   Resp: 17 18 23 18   Temp:       TempSrc:       SpO2: 97% 95% 96% 95%   Weight:       Height:           Patient was given the following medications:  Medications   furosemide (LASIX) injection 40 mg (has no administration in time range)   ipratropium 0.5 mg-albuterol 2.5 mg (DUONEB) nebulizer solution 1 Dose (1 Dose Inhalation Given 6/13/24 2240)            The patient tolerated their visit well.   Thepatient and / or the family were informed of the results of any tests, a time was given to answer questions.    I am the Primary Clinician of Record.    FINAL IMPRESSION      1. Acute on chronic congestive heart failure, unspecified heart failure type (HCC)    2. Acute hypoxemic respiratory failure (HCC)    3. Dementia, unspecified dementia severity, unspecified dementia type, unspecified whether behavioral, psychotic, or mood disturbance or anxiety (HCC)        DISPOSITION/PLAN   DISPOSITION Admitted 06/14/2024 12:02:41 AM      PATIENT REFERRED TO:  No follow-up provider specified.    DISCHARGE MEDICATIONS:  New Prescriptions    No medications on

## 2024-06-14 NOTE — H&P
HISTORY & PHYSICAL      Patient: No Alberts MRN: [id number]  CSN: [id number]    YOB: 1935  Age: 89 y.o.  Sex: female    DOA: 6/13/2024 LOS:  LOS: 0 days        DOA: 6/13/2024        Assessment/Plan     An 89 years old presented from the nursing home due to shortness of breath and low oxygen saturation    -Acute CHF exacerbation.  No recent echo in records.  -COPD  -Acute hypoxic respiratory failure secondary to the above  -Advanced dementia  -Diabetes mellitus  -Hypertension  -Psychiatric history-sleep apnea   -Thyroid disease  -Tremors    The patient is admitted to telemetry  Placed on IV Lasix  Monitor intake and output  Trend cardiac enzymes  Echocardiogram was requested  Monitor renal function and electrolytes  DuoNebs every 6 hours  Continue other home meds  Wean off oxygen as tolerated    The patient is DNR, per nursing home papers    Patient Active Problem List   Diagnosis    Wound dehiscence    Lower GI bleeding    Leg swelling    Anemia due to blood loss    Diarrhea    Hypothyroidism (acquired)    HTN (hypertension)    Acute on chronic congestive heart failure, unspecified heart failure type (HCC)    Acute decompensated heart failure (HCC)       History of Presenting Illness:  An 89 years old who was brought from the nursing home because of shortness of breath and hypoxia reported in the 80s.  The patient is a nursing home patient with medical history significant for dementia, CHF per records, COPD, diabetes, hypertension, psychiatric history, sleep apnea, thyroid disease and tremors.  She is not able to provide history due to her advanced dementia.  She received nebs by EMS for COPD, and IV Lasix in ED for acute CHF.  She was placed on oxygen by nasal cannula and admitted for further management.    Past Medical History:   Diagnosis Date    Alzheimer's dementia (HCC)     Arthritis     osteo    Diabetes (HCC)     GERD (gastroesophageal reflux disease)     Heart failure (HCC)

## 2024-06-14 NOTE — PROGRESS NOTES
Pharmacy Note - Therapeutic Interchange    Pulmicort Respules® (budesonide) + Arformoterol 15mcg/2ml Nebulization Solution were therapeutically interchanged for Symbicort® (budesonide/formoterol) per the P&T Committee approved Therapeutic Interchanges Policy. Approved dose conversion listed in table below.    Medication Ordered Preferred Medication Dispensed   Advair Diskus® (fluticasone/salmeterol) 250/50 mcg BID       Pulmicort Respules® (budesonide) 0.5mg/2ml BID  +  Arformoterol 15mcg/2ml BID   Advair HFA® (fluticasone/salmeterol ) 115/21mcg BID    Fluticasone/salmeterol (Airduo Respiclick) 113/14mcg 1 puff BID    Breo Ellipta® (fluticasone/vilanterol) 200/25 mcg daily    Dulera® (mometasone/formoterol) 200/5 mcg 2 puffs BID    Symbicort® (budesonide/formoterol) 160/4.5 mcg BID        SHAHIDA TAYLOR RPH, Pharmacist  6/14/2024 12:52 AM

## 2024-06-14 NOTE — PLAN OF CARE
Problem: Safety - Adult  Goal: Free from fall injury  6/14/2024 0744 by Delroy Cedillo RN  Outcome: Progressing  6/14/2024 0509 by Delroy Cedillo RN  Outcome: Progressing     Problem: Discharge Planning  Goal: Discharge to home or other facility with appropriate resources  6/14/2024 0744 by Delroy Cedillo RN  Outcome: Progressing  6/14/2024 0509 by Delroy Cedillo RN  Outcome: Progressing     Problem: Pain  Goal: Verbalizes/displays adequate comfort level or baseline comfort level  6/14/2024 0744 by Delroy Cedillo RN  Outcome: Progressing  6/14/2024 0509 by Delroy Cedillo RN  Outcome: Progressing     Problem: Skin/Tissue Integrity  Goal: Absence of new skin breakdown  Description: 1.  Monitor for areas of redness and/or skin breakdown  2.  Assess vascular access sites hourly  3.  Every 4-6 hours minimum:  Change oxygen saturation probe site  4.  Every 4-6 hours:  If on nasal continuous positive airway pressure, respiratory therapy assess nares and determine need for appliance change or resting period.  Outcome: Progressing

## 2024-06-14 NOTE — CARE COORDINATION
SW attempted to complete IA, Pt presented as sleep, SW said Pt name 2x with no response.     SW to complete assessment as time permits.     JESSICA Lopez  Case Management Department

## 2024-06-14 NOTE — ED NOTES
Pt arrived ED via EMS with C/O SOB.  Per facility, pt was having a hard tie breathing, satting 92% on RA.  Neb treatment administered.  Pt with hx of COPD, Alzheimer and Dementia.  Pt with tremors (baseline)

## 2024-06-14 NOTE — CONSULTS
5 % and 0.9 % sodium chloride infusion   IntraVENous Continuous         Physical Exam:     Vitals:    06/14/24 1600   BP:    Pulse: 65   Resp:    Temp:    SpO2:        BP Readings from Last 3 Encounters:   06/14/24 (!) 155/65     Pulse Readings from Last 3 Encounters:   06/14/24 65     Wt Readings from Last 3 Encounters:   06/14/24 68 kg (150 lb)       EXAMINATION:  General:  Alert, cooperative, no distress  Head:  Normocephalic, without obvious abnormality, atraumatic.  Eyes:  Conjunctivae/corneas clear  Lungs:   Clear to auscultation bilaterally, no wheezes, no rales, no rhonchi  Heart:  Regular rate and rhythm, S1, S2 normal, systolic ejection murmur loudest right sternal border 3 out of 6 no rub or gallop  Abdomen:   Soft, non-tender. Bowel sounds normal.   Extremities: Extremities normal, 1+ edema lower extremities bilaterally  Skin:  No rashes or lesions visible extremities  Neurologic:  Normal strength, sensation       Data Review:     Recent Labs     06/13/24 2144 06/14/24  0955   WBC 8.0 6.8   HGB 11.3* 10.5*   HCT 39.1 35.5    186     Recent Labs     06/13/24 2144 06/14/24  0955    142   K 4.8 4.1    104   CO2 37* 31   BUN 27* 20*   CREATININE 1.04 0.99   MG  --  1.1*   PHOS  --  2.7   ALT 13  --        All Cardiac Markers in the last 24 hours:    Lab Results   Component Value Date/Time    TROPHS 35 06/14/2024 09:55 AM    TROPHS 35 06/14/2024 06:31 AM    TROPHS 21 06/13/2024 11:40 PM     Lab Results   Component Value Date/Time    NTPROBNP 1,305 06/13/2024 09:44 PM       Last Lipid:  No results found for: \"CHOL\", \"CHLST\", \"CHOLV\", \"HDL\", \"HDLC\", \"LDL\"    Cardiographics:     Encounter Date: 06/13/24   EKG 12 Lead (SOB)   Result Value    Ventricular Rate 69    Atrial Rate 68    QRS Duration 100    Q-T Interval 380    QTc Calculation (Bazett) 407    R Axis -45    T Axis 59    Diagnosis      Poor data quality, interpretation may be adversely affected  Electrode noise  Repeat EKG  Sinus  rhythm  Left axis deviation  Septal infarct (cited on or before 13-JUN-2024)  Abnormal ECG  When compared with ECG of 13-JUN-2024 22:47,  No significant change was found  Confirmed by Adolfo Celis MD ([id number]) on 6/14/2024 10:54:05 AM       06/13/24    ECHO (TTE) COMPLETE (PRN CONTRAST/BUBBLE/STRAIN/3D) 06/14/2024  4:03 PM (Final)    Interpretation Summary    Image quality is technically difficult. Technically difficult study due to patient's body habitus and limited study due to patient's ability to tolerate test.    Left Ventricle: Normal left ventricular systolic function with a visually estimated EF of 60 - 65%. Left ventricle size is normal. Mild septal thickening. Normal wall motion.    Left Atrium: Left atrial volume index is mildly increased (35-41 mL/m2). LA Vol Index A/L is 38 mL/m2.    Aortic Valve: Trileaflet valve. Moderately calcified cusp. Trace regurgitation. Moderate to severe stenosis of the aortic valve. Noted by the apical view. AV mean gradient is 29 mmHg. AV peak velocity is 3.9 m/s. LVOT:AV VTI Index is 0.23. AV area by continuity VTI is 0.7 cm2. AV Stroke Volume index is 36.5 mL/m2.    Pericardium: Small (<1 cm) pericardial effusion present. No indication of cardiac tamponade.    Signed by: Nato Winn MD on 6/14/2024  4:03 PM    No results found for this or any previous visit.    No results found for this or any previous visit.    Xray Result (most recent):  XR CHEST PORTABLE 06/13/2024    Narrative  EXAM: XR CHEST PORTABLE    CLINICAL INDICATION/HISTORY : Provided with order: Shortness of Breath.    COMPARISON: None    TECHNIQUE: 1 VIEWS    FINDINGS:  Limited by body habitus and hypoinflation. EKG leads and wires overlie the  chest.  No focal lung consolidation.  Mild cardiomegaly. Atherosclerotic vascular calcification..  Mild blunting lateral LEFT costophrenic angle.    Chronic RIGHT rib fractures    Impression  1.  Nothing definitely acute.  2.  Mild cardiomegaly.  3.  Small LEFT

## 2024-06-14 NOTE — PROGRESS NOTES
Speech Pathology    Attempt SLP eval x2, however Pt is currently lethargic/unable to be aroused for safe PO trials. Will attempt next business date. D/w MD.    Thank you for this referral.    Nellie Clemons M.S. CCC-SLP  Speech Language Pathologist

## 2024-06-14 NOTE — PROGRESS NOTES
Jose Sahu [facility] Hospitalist Group  Progress Note    Patient: No Alberts Age: 89 y.o. : 1935 MR#: [id number] SSN: [id number]  Date/Time: 2024    Subjective:     Called by RT regarding concern for pt lethargy, difficult to arouse. AB.39   /   62.4    /   73     /38.0.    Patient seen and examined at bedside, she opens eyes and looks at speaker, nonverbal, not following commands.    Serum glucose 81 at 10 AM today.    Discussed with daughter, who states that at baseline patient is quite talkative, though not oriented.  Daughter also notes a history of COPD, and states the patient was wheezing over the past couple of days.  Daughter reports that patient does not use a CPAP machine, for unclear reasons.  Patient has been here for the past several years, she had relocated from [place].  Dementia has been progressively worsening per daughter's description.  Patient has had several urinary tract infections.    Patient reassessed 2:30 p.m., remains nonverbal.  Looks briefly at speaker.  No family at bedside.      Assessment/Plan:     1.  Volume depletion.  IV fluids.  2.  Acute metabolic encephalopathy superimposed on advanced dementia.  Ammonia, B12, folate within normal limits.  CT head nil acute.  Treat hypothyroidism.  Follow urinalysis, urine culture.  3.  COPD, no acute exacerbation.  Continue DuoNeb scheduled.  4. Acute hypoxic respiratory failure secondary to the above.  Continue supplemental oxygen.  5. Advanced dementia, not at baseline mental status per daughter's description.  See #2.  6. DM2 . Ssi.  D5 normal, as patient is not safe for p.o. intake.  7.  Dysphagia, in the setting of altered mental status.  Continue n.p.o. at this time.  Consider NG tube tomorrow if patient's mental status is not improving.  Daughter in agreement with NG tube tomorrow if needed.  8.  Myxedema coma. Serum cortisol level drawn. Solucortef now, followed by iv synthroid. D5 NS. Discussed  11.3 (L) 12.0 - 16.0 g/dL    Hematocrit 39.1 35.0 - 45.0 %    MCV 95.4 78.0 - 100.0 FL    MCH 27.6 24.0 - 34.0 PG    MCHC 28.9 (L) 31.0 - 37.0 g/dL    RDW 16.0 (H) 11.6 - 14.5 %    Platelets 215 135 - 420 K/uL    MPV 9.6 9.2 - 11.8 FL    Nucleated RBCs 0.0 0  WBC    nRBC 0.00 0.00 - 0.01 K/uL    Neutrophils % 68 40 - 73 %    Lymphocytes % 23 21 - 52 %    Monocytes % 5 3 - 10 %    Eosinophils % 2 0 - 5 %    Basophils % 1 0 - 2 %    Immature Granulocytes % 0 0.0 - 0.5 %    Neutrophils Absolute 5.5 1.8 - 8.0 K/UL    Lymphocytes Absolute 1.8 0.9 - 3.6 K/UL    Monocytes Absolute 0.4 0.05 - 1.2 K/UL    Eosinophils Absolute 0.2 0.0 - 0.4 K/UL    Basophils Absolute 0.0 0.0 - 0.1 K/UL    Immature Granulocytes Absolute 0.0 0.00 - 0.04 K/UL    Differential Type AUTOMATED     CMP    Collection Time: 06/13/24  9:44 PM   Result Value Ref Range    Sodium 143 136 - 145 mmol/L    Potassium 4.8 3.5 - 5.5 mmol/L    Chloride 104 100 - 111 mmol/L    CO2 37 (H) 21 - 32 mmol/L    Anion Gap 2 (L) 3.0 - 18 mmol/L    Glucose 146 (H) 74 - 99 mg/dL    BUN 27 (H) 7.0 - 18 MG/DL    Creatinine 1.04 0.6 - 1.3 MG/DL    BUN/Creatinine Ratio 26 (H) 12 - 20      Est, Glom Filt Rate 52 (L) >60 ml/min/1.73m2    Calcium 8.7 8.5 - 10.1 MG/DL    Total Bilirubin 0.4 0.2 - 1.0 MG/DL    ALT 13 13 - 56 U/L    AST 19 10 - 38 U/L    Alk Phosphatase 53 45 - 117 U/L    Total Protein 6.6 6.4 - 8.2 g/dL    Albumin 3.1 (L) 3.4 - 5.0 g/dL    Globulin 3.5 2.0 - 4.0 g/dL    Albumin/Globulin Ratio 0.9 0.8 - 1.7     Brain Natriuretic Peptide    Collection Time: 06/13/24  9:44 PM   Result Value Ref Range    NT Pro-BNP 1,305 0 - 1,800 PG/ML   Troponin    Collection Time: 06/13/24  9:44 PM   Result Value Ref Range    Troponin, High Sensitivity 17 0 - 54 ng/L   EKG 12 Lead (SOB)    Collection Time: 06/13/24 10:47 PM   Result Value Ref Range    Ventricular Rate 69 BPM    Atrial Rate 68 BPM    QRS Duration 100 ms    Q-T Interval 380 ms    QTc Calculation (Bazett) 407 ms

## 2024-06-14 NOTE — PROGRESS NOTES
Patient is comfortably resting, hard to arouse. Patient is not available to be assessed at this time.       Corina Begum, [facility]  Spiritual Care   [phone]

## 2024-06-14 NOTE — ACP (ADVANCE CARE PLANNING)
Advance Care Planning   Healthcare Decision Maker:    Primary Decision Maker: Oralia Tolentino - Child - [phone]    Secondary Decision Maker: Marline Gibbons - Niece/Nephew - [phone]    Click here to complete Healthcare Decision Makers including selection of the Healthcare Decision Maker Relationship (ie \"Primary\").  Today we documented Decision Maker(s) consistent with ACP documents on file.      is unable to obtain any information from patient because patient's ability to make healthcare decisions is in question at this time.    Patient is comfortably resting and not easily aroused at the time of visit and is therefore not available to be assessed at this time.      Chaplain Corina Begum, [facility]  Spiritual Care   [phone]

## 2024-06-14 NOTE — PLAN OF CARE
Problem: Safety - Adult  Goal: Free from fall injury  6/14/2024 1630 by Anabel Nayak RN  Outcome: Not Progressing  6/14/2024 0744 by Delroy Cedillo RN  Outcome: Progressing  6/14/2024 0509 by Delroy Cedillo RN  Outcome: Progressing     Problem: Pain  Goal: Verbalizes/displays adequate comfort level or baseline comfort level  6/14/2024 1630 by Anabel Nayak RN  Outcome: Not Progressing  6/14/2024 0744 by Delroy Cedillo RN  Outcome: Progressing  6/14/2024 0509 by Delroy Cedillo RN  Outcome: Progressing     Problem: Skin/Tissue Integrity  Goal: Absence of new skin breakdown  Description: 1.  Monitor for areas of redness and/or skin breakdown  2.  Assess vascular access sites hourly  3.  Every 4-6 hours minimum:  Change oxygen saturation probe site  4.  Every 4-6 hours:  If on nasal continuous positive airway pressure, respiratory therapy assess nares and determine need for appliance change or resting period.  6/14/2024 1630 by Anabel Nayak RN  Outcome: Not Progressing  6/14/2024 0744 by Delroy Cedillo RN  Outcome: Progressing

## 2024-06-15 PROBLEM — I35.0 SEVERE AORTIC STENOSIS: Status: ACTIVE | Noted: 2024-06-15

## 2024-06-15 PROBLEM — R79.89 ELEVATED TROPONIN: Status: ACTIVE | Noted: 2024-06-15

## 2024-06-15 PROBLEM — J96.01 ACUTE HYPOXIC RESPIRATORY FAILURE (HCC): Status: ACTIVE | Noted: 2024-06-15

## 2024-06-15 LAB
ANION GAP SERPL CALC-SCNC: 3 MMOL/L (ref 3–18)
APPEARANCE UR: CLEAR
BASOPHILS # BLD: 0 K/UL (ref 0–0.1)
BASOPHILS NFR BLD: 0 % (ref 0–2)
BILIRUB UR QL: NEGATIVE
BUN SERPL-MCNC: 24 MG/DL (ref 7–18)
BUN/CREAT SERPL: 28 (ref 12–20)
CALCIUM SERPL-MCNC: 8.7 MG/DL (ref 8.5–10.1)
CHLORIDE SERPL-SCNC: 103 MMOL/L (ref 100–111)
CO2 SERPL-SCNC: 35 MMOL/L (ref 21–32)
COLOR UR: YELLOW
CREAT SERPL-MCNC: 0.86 MG/DL (ref 0.6–1.3)
DIFFERENTIAL METHOD BLD: ABNORMAL
EOSINOPHIL # BLD: 0 K/UL (ref 0–0.4)
EOSINOPHIL NFR BLD: 0 % (ref 0–5)
ERYTHROCYTE [DISTWIDTH] IN BLOOD BY AUTOMATED COUNT: 15.9 % (ref 11.6–14.5)
GLUCOSE BLD STRIP.AUTO-MCNC: 231 MG/DL (ref 70–110)
GLUCOSE BLD STRIP.AUTO-MCNC: 279 MG/DL (ref 70–110)
GLUCOSE BLD STRIP.AUTO-MCNC: 288 MG/DL (ref 70–110)
GLUCOSE BLD STRIP.AUTO-MCNC: 312 MG/DL (ref 70–110)
GLUCOSE SERPL-MCNC: 228 MG/DL (ref 74–99)
GLUCOSE UR STRIP.AUTO-MCNC: NEGATIVE MG/DL
HCT VFR BLD AUTO: 36.4 % (ref 35–45)
HGB BLD-MCNC: 10.8 G/DL (ref 12–16)
HGB UR QL STRIP: NEGATIVE
IMM GRANULOCYTES # BLD AUTO: 0 K/UL (ref 0–0.04)
IMM GRANULOCYTES NFR BLD AUTO: 1 % (ref 0–0.5)
KETONES UR QL STRIP.AUTO: NEGATIVE MG/DL
LEUKOCYTE ESTERASE UR QL STRIP.AUTO: NEGATIVE
LYMPHOCYTES # BLD: 1.3 K/UL (ref 0.9–3.6)
LYMPHOCYTES NFR BLD: 18 % (ref 21–52)
MAGNESIUM SERPL-MCNC: 2.1 MG/DL (ref 1.6–2.6)
MCH RBC QN AUTO: 27.3 PG (ref 24–34)
MCHC RBC AUTO-ENTMCNC: 29.7 G/DL (ref 31–37)
MCV RBC AUTO: 92.2 FL (ref 78–100)
MONOCYTES # BLD: 0.1 K/UL (ref 0.05–1.2)
MONOCYTES NFR BLD: 1 % (ref 3–10)
NEUTS SEG # BLD: 5.7 K/UL (ref 1.8–8)
NEUTS SEG NFR BLD: 80 % (ref 40–73)
NITRITE UR QL STRIP.AUTO: NEGATIVE
NRBC # BLD: 0 K/UL (ref 0–0.01)
NRBC BLD-RTO: 0 PER 100 WBC
PH UR STRIP: 5.5 (ref 5–8)
PHOSPHATE SERPL-MCNC: 3.2 MG/DL (ref 2.5–4.9)
PLATELET # BLD AUTO: 202 K/UL (ref 135–420)
PMV BLD AUTO: 10.2 FL (ref 9.2–11.8)
POTASSIUM SERPL-SCNC: 4 MMOL/L (ref 3.5–5.5)
PROT UR STRIP-MCNC: NEGATIVE MG/DL
RBC # BLD AUTO: 3.95 M/UL (ref 4.2–5.3)
SODIUM SERPL-SCNC: 141 MMOL/L (ref 136–145)
SP GR UR REFRACTOMETRY: 1.01 (ref 1–1.03)
T3FREE SERPL-MCNC: 0.8 PG/ML (ref 2.18–3.98)
T4 FREE SERPL-MCNC: 0.8 NG/DL (ref 0.7–1.5)
TSH SERPL DL<=0.05 MIU/L-ACNC: 56.9 UIU/ML (ref 0.36–3.74)
UROBILINOGEN UR QL STRIP.AUTO: 0.2 EU/DL (ref 0.2–1)
WBC # BLD AUTO: 7.1 K/UL (ref 4.6–13.2)

## 2024-06-15 PROCEDURE — 80048 BASIC METABOLIC PNL TOTAL CA: CPT

## 2024-06-15 PROCEDURE — 6360000002 HC RX W HCPCS: Performed by: HOSPITALIST

## 2024-06-15 PROCEDURE — 2580000003 HC RX 258: Performed by: INTERNAL MEDICINE

## 2024-06-15 PROCEDURE — 84443 ASSAY THYROID STIM HORMONE: CPT

## 2024-06-15 PROCEDURE — 99232 SBSQ HOSP IP/OBS MODERATE 35: CPT | Performed by: HOSPITALIST

## 2024-06-15 PROCEDURE — 1100000003 HC PRIVATE W/ TELEMETRY

## 2024-06-15 PROCEDURE — 6360000002 HC RX W HCPCS: Performed by: INTERNAL MEDICINE

## 2024-06-15 PROCEDURE — 99233 SBSQ HOSP IP/OBS HIGH 50: CPT | Performed by: INTERNAL MEDICINE

## 2024-06-15 PROCEDURE — 83735 ASSAY OF MAGNESIUM: CPT

## 2024-06-15 PROCEDURE — 6370000000 HC RX 637 (ALT 250 FOR IP): Performed by: HOSPITALIST

## 2024-06-15 PROCEDURE — 82962 GLUCOSE BLOOD TEST: CPT

## 2024-06-15 PROCEDURE — 36415 COLL VENOUS BLD VENIPUNCTURE: CPT

## 2024-06-15 PROCEDURE — 84481 FREE ASSAY (FT-3): CPT

## 2024-06-15 PROCEDURE — 2580000003 HC RX 258: Performed by: HOSPITALIST

## 2024-06-15 PROCEDURE — 2700000000 HC OXYGEN THERAPY PER DAY

## 2024-06-15 PROCEDURE — 87086 URINE CULTURE/COLONY COUNT: CPT

## 2024-06-15 PROCEDURE — 85025 COMPLETE CBC W/AUTO DIFF WBC: CPT

## 2024-06-15 PROCEDURE — 94761 N-INVAS EAR/PLS OXIMETRY MLT: CPT

## 2024-06-15 PROCEDURE — 92610 EVALUATE SWALLOWING FUNCTION: CPT

## 2024-06-15 PROCEDURE — 94640 AIRWAY INHALATION TREATMENT: CPT

## 2024-06-15 PROCEDURE — 84100 ASSAY OF PHOSPHORUS: CPT

## 2024-06-15 PROCEDURE — 81003 URINALYSIS AUTO W/O SCOPE: CPT

## 2024-06-15 PROCEDURE — 6370000000 HC RX 637 (ALT 250 FOR IP): Performed by: INTERNAL MEDICINE

## 2024-06-15 PROCEDURE — 84439 ASSAY OF FREE THYROXINE: CPT

## 2024-06-15 RX ORDER — AMLODIPINE BESYLATE 2.5 MG/1
2.5 TABLET ORAL
Status: COMPLETED | OUTPATIENT
Start: 2024-06-15 | End: 2024-06-15

## 2024-06-15 RX ORDER — LIOTHYRONINE SODIUM 5 UG/1
10 TABLET ORAL 2 TIMES DAILY
Status: CANCELLED | OUTPATIENT
Start: 2024-06-15

## 2024-06-15 RX ORDER — AMLODIPINE BESYLATE 5 MG/1
5 TABLET ORAL DAILY
Status: DISCONTINUED | OUTPATIENT
Start: 2024-06-16 | End: 2024-06-21 | Stop reason: HOSPADM

## 2024-06-15 RX ORDER — INSULIN LISPRO 100 [IU]/ML
0-4 INJECTION, SOLUTION INTRAVENOUS; SUBCUTANEOUS
Status: DISCONTINUED | OUTPATIENT
Start: 2024-06-15 | End: 2024-06-21 | Stop reason: HOSPADM

## 2024-06-15 RX ORDER — INSULIN GLARGINE 100 [IU]/ML
12 INJECTION, SOLUTION SUBCUTANEOUS
Status: DISCONTINUED | OUTPATIENT
Start: 2024-06-15 | End: 2024-06-16

## 2024-06-15 RX ORDER — IPRATROPIUM BROMIDE AND ALBUTEROL SULFATE 2.5; .5 MG/3ML; MG/3ML
1 SOLUTION RESPIRATORY (INHALATION) EVERY 4 HOURS PRN
Status: DISCONTINUED | OUTPATIENT
Start: 2024-06-15 | End: 2024-06-21 | Stop reason: HOSPADM

## 2024-06-15 RX ADMIN — INSULIN LISPRO 2 UNITS: 100 INJECTION, SOLUTION INTRAVENOUS; SUBCUTANEOUS at 17:30

## 2024-06-15 RX ADMIN — LEVOTHYROXINE SODIUM ANHYDROUS 100 MCG: 100 INJECTION, POWDER, LYOPHILIZED, FOR SOLUTION INTRAVENOUS at 17:31

## 2024-06-15 RX ADMIN — BUDESONIDE 500 MCG: 0.5 INHALANT RESPIRATORY (INHALATION) at 19:56

## 2024-06-15 RX ADMIN — AMLODIPINE BESYLATE 2.5 MG: 2.5 TABLET ORAL at 09:42

## 2024-06-15 RX ADMIN — HYDROCORTISONE SODIUM SUCCINATE 100 MG: 100 INJECTION, POWDER, FOR SOLUTION INTRAMUSCULAR; INTRAVENOUS at 05:50

## 2024-06-15 RX ADMIN — INSULIN GLARGINE 12 UNITS: 100 INJECTION, SOLUTION SUBCUTANEOUS at 11:07

## 2024-06-15 RX ADMIN — FUROSEMIDE 40 MG: 10 INJECTION, SOLUTION INTRAMUSCULAR; INTRAVENOUS at 09:42

## 2024-06-15 RX ADMIN — INSULIN LISPRO 3 UNITS: 100 INJECTION, SOLUTION INTRAVENOUS; SUBCUTANEOUS at 11:07

## 2024-06-15 RX ADMIN — SODIUM CHLORIDE, PRESERVATIVE FREE 10 ML: 5 INJECTION INTRAVENOUS at 17:36

## 2024-06-15 RX ADMIN — INSULIN LISPRO 2 UNITS: 100 INJECTION, SOLUTION INTRAVENOUS; SUBCUTANEOUS at 21:06

## 2024-06-15 RX ADMIN — ARFORMOTEROL TARTRATE 15 MCG: 15 SOLUTION RESPIRATORY (INHALATION) at 19:56

## 2024-06-15 RX ADMIN — FLUTICASONE PROPIONATE 2 SPRAY: 50 SPRAY, METERED NASAL at 11:10

## 2024-06-15 RX ADMIN — HYDROCORTISONE SODIUM SUCCINATE 100 MG: 100 INJECTION, POWDER, FOR SOLUTION INTRAMUSCULAR; INTRAVENOUS at 14:04

## 2024-06-15 RX ADMIN — SODIUM CHLORIDE, PRESERVATIVE FREE 10 ML: 5 INJECTION INTRAVENOUS at 21:04

## 2024-06-15 ASSESSMENT — PAIN SCALES - GENERAL
PAINLEVEL_OUTOF10: 0

## 2024-06-15 NOTE — PROGRESS NOTES
Cardiology Progress Note    Admit Date: 6/13/2024  Attending Cardiologist: Dr. Pickering    IMPRESSION  Acute hypoxic respiratory failure, in part secondary to congestive heart failure, COPD  Acute on chronic heart failure unspecified, as observed by 2D echo 6/14/2024 preserved ejection fraction 60 to 65%, EKG 6/13/2024 sinus rhythm left axis deviation, chest x-ray 6/14/2024 mild cardiomegaly small left pleural effusion versus pleural thickening, bilateral lower extremity edema  Aortic valve moderate to severe stenosis by 2D echo 6/14/2024 AV peak velocity 3.9 m/s  Hypertension -normotensive to stage II pressure  Elevated troponin 35 ng/L,, 35 ng/L, 21 ng/L, 17 ng/L  Coronary risk equivalent diabetes mellitus  Obstructive sleep apnea     PLAN  Monitor fluid status, adjust as necessary, fluid restriction, salt intake <2g per day.  Was on Lasix 20 mg p.o. daily at home, received Lasix 40 mg IV x 1, Continue lasix 40mg iv daily  Recommend Guideline Directed medical therapy as can tolerate.  Aldactone currently held, metoprolol succinate held, has labile blood pressures can trend  Monitor blood pressure, adjust antihypertensive medications as necessary.  Continue norvasc 5mg po daily,   The patient has moderate to severe aortic valve stenosis has advanced dementia and not communicating well medical management and palliation may be best option.       Thank you for this consultation and for allowing us to participate in this patient's care.     Cardiologist Dr. Ladan HICKS Consult    Subjective:   Denies chest pain  Denies shortness of breath  Denies abdominal pain    Objective:      Patient Vitals for the past 8 hrs:   Temp Pulse Resp BP SpO2   06/15/24 0804 97.8 °F (36.6 °C) 66 18 (!) 147/65 97 %         Patient Vitals for the past 96 hrs:   Weight   06/14/24 1519 68 kg (150 lb)   06/13/24 2233 68 kg (150 lb)           Intake/Output Summary (Last 24 hours) at 6/15/2024 0825  Last data filed at 6/14/2024 1523  Gross per 24

## 2024-06-15 NOTE — PLAN OF CARE
Problem: Safety - Adult  Goal: Free from fall injury  Outcome: Progressing  Flowsheets (Taken 6/15/2024 1845)  Free From Fall Injury: Instruct family/caregiver on patient safety     Problem: Discharge Planning  Goal: Discharge to home or other facility with appropriate resources  Outcome: Progressing  Flowsheets (Taken 6/15/2024 1845)  Discharge to home or other facility with appropriate resources: Identify barriers to discharge with patient and caregiver     Problem: Pain  Goal: Verbalizes/displays adequate comfort level or baseline comfort level  Outcome: Progressing  Flowsheets (Taken 6/15/2024 1845)  Verbalizes/displays adequate comfort level or baseline comfort level:   Encourage patient to monitor pain and request assistance   Assess pain using appropriate pain scale     Problem: Skin/Tissue Integrity  Goal: Absence of new skin breakdown  Description: 1.  Monitor for areas of redness and/or skin breakdown  2.  Assess vascular access sites hourly  3.  Every 4-6 hours minimum:  Change oxygen saturation probe site  4.  Every 4-6 hours:  If on nasal continuous positive airway pressure, respiratory therapy assess nares and determine need for appliance change or resting period.  Outcome: Progressing     Problem: Neurosensory - Adult  Goal: Achieves stable or improved neurological status  Outcome: Progressing  Flowsheets (Taken 6/15/2024 1845)  Achieves stable or improved neurological status: Assess for and report changes in neurological status     Problem: Neurosensory - Adult  Goal: Achieves maximal functionality and self care  Outcome: Progressing  Flowsheets (Taken 6/15/2024 1845)  Achieves maximal functionality and self care:   Monitor swallowing and airway patency with patient fatigue and changes in neurological status   Encourage and assist patient to increase activity and self care with guidance from physical therapy/occupational therapy     Problem: Respiratory - Adult  Goal: Achieves optimal ventilation  and oxygenation  Outcome: Progressing     Problem: Skin/Tissue Integrity - Adult  Goal: Skin integrity remains intact  Outcome: Progressing  Flowsheets (Taken 6/15/2024 1845)  Skin Integrity Remains Intact: Monitor for areas of redness and/or skin breakdown     Problem: Genitourinary - Adult  Goal: Absence of urinary retention  Outcome: Progressing  Flowsheets (Taken 6/15/2024 1845)  Absence of urinary retention:   Assess patient’s ability to void and empty bladder   Monitor intake/output and perform bladder scan as needed

## 2024-06-15 NOTE — PLAN OF CARE
Problem: SLP Adult - Impaired Swallowing  Goal: By Discharge: Advance to least restrictive diet without signs or symptoms of aspiration for planned discharge setting.  See evaluation for individualized goals.  Description: Patient will:  1. Tolerate PO trials with 0 s/s overt distress in 4/5 trials  2. Utilize compensatory swallow strategies/maneuvers (decrease bite/sip, size/rate, alt. liq/sol) with min cues in 4/5 trials  3. Perform oral-motor/laryngeal exercises to increase oropharyngeal swallow function with min cues  4. Complete an objective swallow study (i.e., MBSS) to assess swallow integrity, r/o aspiration, and determine of safest LRD, min A as indicated/ordered by MD     Recommend:   Puree, thin liquids  Meds crushed in puree as able   Feeding assistance   Aspiration precautions  HOB >45 degrees during all intake and for at least 30 min after po   Small bites/sips, slow rate of intake, alternating bites/sips  Oral care post meals     Outcome: Progressing     SPEECH LANGUAGE PATHOLOGY BEDSIDE SWALLOW EVALUATION    Patient: No Alberts (89 y.o. female)  Date: 6/15/2024  Primary Diagnosis: Acute decompensated heart failure (HCC) [I50.9]  Acute hypoxemic respiratory failure (HCC) [J96.01]  Acute on chronic congestive heart failure, unspecified heart failure type (HCC) [I50.9]  Dementia, unspecified dementia severity, unspecified dementia type, unspecified whether behavioral, psychotic, or mood disturbance or anxiety ([facility]) [F03.90]  Precautions: Aspiration  PLOF: As per H&P  ASSESSMENT:  Based on the objective data described below, the patient presents with mild-mod oral dysphagia.  Pt alert but confused with intermittently incomprehensible speech.  Oral mech exam revealed structures grossly intact for speech/deglutition.   Per nursing, consumed 100% of puree breakfast meal with no difficulty.  Pt tolerating thin liquids via consecutive swallows + straw with timely swallow initiation and adequate  laryngeal elevation to palpation.  Demo prolonged/incomplete mastication of soft and bite sized presentations, improved with puree.  Recommend continue current diet.  Will follow x1-2 visits to ensure continued diet tolerance. D/w pt and RN.    Patient will benefit from skilled intervention to address the above impairments.  Patient's rehabilitation potential is fair   Factors which may influence rehabilitation potential include:   []            None noted  [x]            Mental ability/status  [x]            Medical condition  [x]            Home/family situation and support systems  [x]            Safety awareness  []            Pain tolerance/management  []            Other:      PLAN :  Recommendations and Planned Interventions:  Recommendations  Recommendations: Dysphagia treatment;Assistance with meals  Diet Solids Recommendation: Pureed  Liquid Consistency Recommendation: Thin  Compensatory Swallowing Strategies : Assist feed;Eat/Feed slowly;Upright as possible for all oral intake;Remain upright for 30-45 minutes after meals;Alternate solids and liquids;Small bites/sips  Recommended Form of Meds: Crushed in puree as able  Therapeutic Interventions: Diet tolerance monitoring;Bolus control exercises;Patient/Family education  Patient Education: Pt educated re: diet recs, aspiration risk/precautions, oral care, positioning  Patient Education Response: Needs reinforcement;No evidence of learning    Frequency/Duration: Patient will be followed by speech-language pathology x1-2 visits to address goals    Discharge Recommendations: To be determined      SUBJECTIVE:   Patient stated, \"is this place new?\"    OBJECTIVE:     Past Medical History:   Diagnosis Date    Alzheimer's dementia (HCC)     Arthritis     osteo    Diabetes (HCC)     GERD (gastroesophageal reflux disease)     Heart failure (HCC)     history of    Hypercholesteremia     Hypertension     Ill-defined condition     Kidney stones     Morbid obesity (HCC)

## 2024-06-15 NOTE — CONSULTS
The patient is an 89 year old woman on whom I am consulted for abnormal thyroid function tests.     The patient was subsisting at her nursing home and was found by staff to be dyspneic and disoriented. Her O2 sat was tested, found to be low, and she was transported to the ER.     She has a past history of several decades of hypothyroidism. She has been on replacment. Her daughter is unsure whether she has actually been getting it in the NH.     Past history: She has had diabetes for at lest six years and was taking insulin.She was admitted to [facility] in mid 2022 with a urinary tract infection and COVID-19. She had previously received both original vaccines and one booster in 2021. She was said to have septicemia on her admission. After being bedridden for two weeks then, she was unable to walk. Sent to rehab, the PT did not help her become ambulatory again. She has been unable to walk and has been bedridden since, primarily due to weakness.     Exam: Awake, alert. Converses, but is not oriented to time or place. VS: pulse 92, T 97.7, RR 18, /75. Skin is generally pale. Thyroid not palplable. Lungs clear. Abdomen soft. No striae. Ext: edema 2+ of both feet and ankles. She is unable to lift her legs off the bed.     Lab: Free T4 0.3, TSH 89.7    Hospital course. Pt was given IV hydrocortisone and then 100 mcg of levothyroxine yesterday. This was followed by 200 mre mcg of levothyroxine. This morning the Free T4 had improved to 0.8. A free T3 as also drawn, and returned also at 0.8. The patient is much more alert and engaging today compared to yesterday, when she was difficult to arouse. Blood sugar has risen into the 300's in spite of insulin therapy.     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  Diabetes, type 2.   3.   Severe peripheral polyneuropathy and myopathy.     Discussion:   The manifest cause of her myxedema is failure either to receive or absorb her levothyroxine in the nursing

## 2024-06-15 NOTE — PROGRESS NOTES
Jose Sahu [facility] Hospitalist Group  Progress Note    Patient: No Alberts Age: 89 y.o. : 1935 MR#: [id number] SSN: [id number]  Date/Time: 6/15/2024    Subjective:     Started on dysphagia diet this morning.  Patient consumed 100% of puréed breakfast with no difficulty.    Sugars climbing into the 200s. Bp as high as 159 / 86.     Urinalysis, urine culture pending.    Patient seen and examined at bedside, she is much more awake and alert.  Declines abdominal exam.  States she is hungry, request chili with crackers.  Denies pain anywhere.  No family at bedside.    Assessment/Plan:     1.  Volume depletion, improving status post IV fluids.  2.  Acute metabolic encephalopathy superimposed on advanced dementia.  Ammonia, B12, folate within normal limits.  CT head nil acute.  Treat hypothyroidism.  Urinalysis negative.  Follow urine culture.  3.  COPD, no acute exacerbation.  Continue DuoNeb scheduled.  4. Acute hypoxic respiratory failure secondary to the above.  Continue supplemental oxygen.  5. Advanced dementia, not at baseline mental status per daughter's description.  See #2.  6. DM2 . Ssi.    7.  Mild to moderate oral dysphagia.  Diet as per SLP.  8.  Myxedema, free T4 improved.  Clinical improvement.. Serum cortisol level?  Not drawn. Solucortef, iv synthroid. .  Endocrinology consult, Dr. Hodge.  Daily free T4.  9. Hypertension, suboptimal control.  Low-dose Norvasc, monitor.  10.  Sleep apnea, not on CPAP for unclear reasons.  11.  Hypomagnesemia replete as needed.  12.  Anemia, normocytic normochromic.  B12, folate WNL.  13.  DVT prophylaxis  14.  DNR.  Continue telemetry monitoring.  PT OT.      Oralia Tolentino (Child)  [phone]     Additional Notes:      Case discussed with:  [x]patient  [x]family  [x]nursing  []case management   [x] discussed on IDT.   DVT Prophylaxis:  [x]Lovenox  []Hep SQ  []SCDs  []Coumadin   []On Heparin gtt   [] noac    Objective:   VS: BP (!)  147/65   Pulse 66   Temp 97.8 °F (36.6 °C) (Oral)   Resp 18   Ht 1.626 m (5' 4\")   Wt 68 kg (150 lb)   SpO2 97%   BMI 25.75 kg/m²    Tmax/24hrs: Temp (24hrs), Av.3 °F (36.8 °C), Min:97.8 °F (36.6 °C), Max:99 °F (37.2 °C)    Input/Output:   Intake/Output Summary (Last 24 hours) at 6/15/2024 0907  Last data filed at 2024 1523  Gross per 24 hour   Intake --   Output 400 ml   Net -400 ml       General: Awake and alert, not oriented.  Answering some questions.  Not following commands.  HEENT:   Normocephalic atraumatic PERRL.  Does not open mouth for oral exam  Cardiovascular: Regular rate and rhythm  Pulmonary: Clear to auscultation bilateral anterior and infra axillary fields  GI: Obese soft nontender nondistended nabs  Extremities: No edema.  DP 2+ bilaterally  Neuro:   Moving all 4 extremities spontaneously.  Answering some questions, not following commands.  Skin:     No rash to visible skin    Labs:    Recent Results (from the past 24 hour(s))   CBC with Auto Differential    Collection Time: 24  9:55 AM   Result Value Ref Range    WBC 6.8 4.6 - 13.2 K/uL    RBC 3.78 (L) 4.20 - 5.30 M/uL    Hemoglobin 10.5 (L) 12.0 - 16.0 g/dL    Hematocrit 35.5 35.0 - 45.0 %    MCV 93.9 78.0 - 100.0 FL    MCH 27.8 24.0 - 34.0 PG    MCHC 29.6 (L) 31.0 - 37.0 g/dL    RDW 16.2 (H) 11.6 - 14.5 %    Platelets 186 135 - 420 K/uL    MPV 10.5 9.2 - 11.8 FL    Nucleated RBCs 0.0 0  WBC    nRBC 0.00 0.00 - 0.01 K/uL    Neutrophils % 61 40 - 73 %    Lymphocytes % 31 21 - 52 %    Monocytes % 5 3 - 10 %    Eosinophils % 2 0 - 5 %    Basophils % 0 0 - 2 %    Immature Granulocytes % 0 0.0 - 0.5 %    Neutrophils Absolute 4.1 1.8 - 8.0 K/UL    Lymphocytes Absolute 2.1 0.9 - 3.6 K/UL    Monocytes Absolute 0.3 0.05 - 1.2 K/UL    Eosinophils Absolute 0.2 0.0 - 0.4 K/UL    Basophils Absolute 0.0 0.0 - 0.1 K/UL    Immature Granulocytes Absolute 0.0 0.00 - 0.04 K/UL    Differential Type AUTOMATED     Basic Metabolic Panel

## 2024-06-15 NOTE — PLAN OF CARE
Problem: Safety - Adult  Goal: Free from fall injury  6/15/2024 0335 by Delroy Cedillo RN  Outcome: Progressing  6/14/2024 1630 by Anabel Nayak RN  Outcome: Not Progressing     Problem: Discharge Planning  Goal: Discharge to home or other facility with appropriate resources  Outcome: Progressing     Problem: Pain  Goal: Verbalizes/displays adequate comfort level or baseline comfort level  6/15/2024 0335 by Delroy Cedillo RN  Outcome: Progressing  6/14/2024 1630 by Anabel Nayak RN  Outcome: Not Progressing     Problem: Skin/Tissue Integrity  Goal: Absence of new skin breakdown  Description: 1.  Monitor for areas of redness and/or skin breakdown  2.  Assess vascular access sites hourly  3.  Every 4-6 hours minimum:  Change oxygen saturation probe site  4.  Every 4-6 hours:  If on nasal continuous positive airway pressure, respiratory therapy assess nares and determine need for appliance change or resting period.  6/15/2024 0335 by Delroy Cedillo RN  Outcome: Progressing  6/14/2024 1630 by Anabel Nayak RN  Outcome: Not Progressing     Problem: Safety - Adult  Goal: Free from fall injury  6/15/2024 0335 by Delroy Cedillo RN  Outcome: Progressing  6/14/2024 1630 by Anabel Nayak RN  Outcome: Not Progressing     Problem: Pain  Goal: Verbalizes/displays adequate comfort level or baseline comfort level  6/15/2024 0335 by Delroy Cedillo RN  Outcome: Progressing  6/14/2024 1630 by Anabel Nayak RN  Outcome: Not Progressing     Problem: Skin/Tissue Integrity  Goal: Absence of new skin breakdown  Description: 1.  Monitor for areas of redness and/or skin breakdown  2.  Assess vascular access sites hourly  3.  Every 4-6 hours minimum:  Change oxygen saturation probe site  4.  Every 4-6 hours:  If on nasal continuous positive airway pressure, respiratory therapy assess nares and determine need for appliance change or resting period.  6/15/2024 0335 by Delroy Cedillo RN  Outcome: Progressing  6/14/2024 1630 by Nael

## 2024-06-16 LAB
BACTERIA SPEC CULT: NORMAL
EST. AVERAGE GLUCOSE BLD GHB EST-MCNC: 174 MG/DL
GLUCOSE BLD STRIP.AUTO-MCNC: 169 MG/DL (ref 70–110)
GLUCOSE BLD STRIP.AUTO-MCNC: 184 MG/DL (ref 70–110)
GLUCOSE BLD STRIP.AUTO-MCNC: 186 MG/DL (ref 70–110)
GLUCOSE BLD STRIP.AUTO-MCNC: 191 MG/DL (ref 70–110)
HBA1C MFR BLD: 7.7 % (ref 4.2–5.6)
SERVICE CMNT-IMP: NORMAL
T4 FREE SERPL-MCNC: 0.8 NG/DL (ref 0.7–1.5)
TSH SERPL DL<=0.05 MIU/L-ACNC: 45.4 UIU/ML (ref 0.36–3.74)

## 2024-06-16 PROCEDURE — 2580000003 HC RX 258: Performed by: HOSPITALIST

## 2024-06-16 PROCEDURE — 84439 ASSAY OF FREE THYROXINE: CPT

## 2024-06-16 PROCEDURE — 99232 SBSQ HOSP IP/OBS MODERATE 35: CPT | Performed by: HOSPITALIST

## 2024-06-16 PROCEDURE — 94761 N-INVAS EAR/PLS OXIMETRY MLT: CPT

## 2024-06-16 PROCEDURE — 6370000000 HC RX 637 (ALT 250 FOR IP): Performed by: HOSPITALIST

## 2024-06-16 PROCEDURE — 6360000002 HC RX W HCPCS: Performed by: INTERNAL MEDICINE

## 2024-06-16 PROCEDURE — 36415 COLL VENOUS BLD VENIPUNCTURE: CPT

## 2024-06-16 PROCEDURE — 2700000000 HC OXYGEN THERAPY PER DAY

## 2024-06-16 PROCEDURE — 97165 OT EVAL LOW COMPLEX 30 MIN: CPT

## 2024-06-16 PROCEDURE — 83036 HEMOGLOBIN GLYCOSYLATED A1C: CPT

## 2024-06-16 PROCEDURE — 6370000000 HC RX 637 (ALT 250 FOR IP): Performed by: INTERNAL MEDICINE

## 2024-06-16 PROCEDURE — 2580000003 HC RX 258: Performed by: INTERNAL MEDICINE

## 2024-06-16 PROCEDURE — 99232 SBSQ HOSP IP/OBS MODERATE 35: CPT | Performed by: INTERNAL MEDICINE

## 2024-06-16 PROCEDURE — 84443 ASSAY THYROID STIM HORMONE: CPT

## 2024-06-16 PROCEDURE — 94640 AIRWAY INHALATION TREATMENT: CPT

## 2024-06-16 PROCEDURE — 6360000002 HC RX W HCPCS: Performed by: HOSPITALIST

## 2024-06-16 PROCEDURE — 1100000003 HC PRIVATE W/ TELEMETRY

## 2024-06-16 PROCEDURE — 82962 GLUCOSE BLOOD TEST: CPT

## 2024-06-16 RX ORDER — LEVOTHYROXINE SODIUM 0.15 MG/1
150 TABLET ORAL
Status: DISCONTINUED | OUTPATIENT
Start: 2024-06-17 | End: 2024-06-17

## 2024-06-16 RX ORDER — FUROSEMIDE 40 MG/1
40 TABLET ORAL DAILY
Status: DISCONTINUED | OUTPATIENT
Start: 2024-06-17 | End: 2024-06-21 | Stop reason: HOSPADM

## 2024-06-16 RX ADMIN — MEMANTINE HYDROCHLORIDE 10 MG: 10 TABLET ORAL at 20:41

## 2024-06-16 RX ADMIN — TROSPIUM CHLORIDE 20 MG: 20 TABLET, FILM COATED ORAL at 20:41

## 2024-06-16 RX ADMIN — ARFORMOTEROL TARTRATE 15 MCG: 15 SOLUTION RESPIRATORY (INHALATION) at 19:02

## 2024-06-16 RX ADMIN — LEVOTHYROXINE SODIUM ANHYDROUS 100 MCG: 100 INJECTION, POWDER, LYOPHILIZED, FOR SOLUTION INTRAVENOUS at 08:16

## 2024-06-16 RX ADMIN — BUDESONIDE 500 MCG: 0.5 INHALANT RESPIRATORY (INHALATION) at 19:02

## 2024-06-16 RX ADMIN — FUROSEMIDE 40 MG: 10 INJECTION, SOLUTION INTRAMUSCULAR; INTRAVENOUS at 08:04

## 2024-06-16 RX ADMIN — BUDESONIDE 500 MCG: 0.5 INHALANT RESPIRATORY (INHALATION) at 07:36

## 2024-06-16 RX ADMIN — SODIUM CHLORIDE, PRESERVATIVE FREE 10 ML: 5 INJECTION INTRAVENOUS at 20:41

## 2024-06-16 RX ADMIN — ROSUVASTATIN CALCIUM 10 MG: 10 TABLET, COATED ORAL at 20:41

## 2024-06-16 RX ADMIN — ARFORMOTEROL TARTRATE 15 MCG: 15 SOLUTION RESPIRATORY (INHALATION) at 07:36

## 2024-06-16 RX ADMIN — AMLODIPINE BESYLATE 5 MG: 5 TABLET ORAL at 08:03

## 2024-06-16 RX ADMIN — FLUTICASONE PROPIONATE 2 SPRAY: 50 SPRAY, METERED NASAL at 07:39

## 2024-06-16 ASSESSMENT — PAIN SCALES - GENERAL: PAINLEVEL_OUTOF10: 0

## 2024-06-16 NOTE — PROGRESS NOTES
Occupational Therapy  OCCUPATIONAL THERAPY EVALUATION/DISCHARGE    Patient: No Alberts (89 y.o. female)  Date: 6/16/2024  Primary Diagnosis: Acute decompensated heart failure (HCC) [I50.9]  Acute hypoxemic respiratory failure (HCC) [J96.01]  Acute on chronic congestive heart failure, unspecified heart failure type (HCC) [I50.9]  Dementia, unspecified dementia severity, unspecified dementia type, unspecified whether behavioral, psychotic, or mood disturbance or anxiety (HCC) [F03.90]   Precautions: Fall Risk, General Precautions  PLOF: Pt is a resident at [facility].       ASSESSMENT AND RECOMMENDATIONS:  Pt cleared for OT evaluation. Pt received lying asleep in bed with blanket over her head, easily aroused to voice only. Pt had significant difficulty answering questions, following commands or responding appropriately to things said to her. Pt unable to follow simple commands to assess bed mobility, ADLs or functional tasks. Pt not appropriate for skilled OT services at this time. No Skilled OT services recommended at this level of care.     Further Equipment Recommendations for Discharge:  NA    AMPAC: AM-PAC Inpatient Daily Activity Raw Score: 12    Current research shows that an AM-PAC score of 17 or less is not associated with a discharge to the patient's home setting.    This [facility] score should be considered in conjunction with interdisciplinary team recommendations to determine the most appropriate discharge setting. Patient's social support, diagnosis, medical stability, and prior level of function should also be taken into consideration.     SUBJECTIVE:   Patient stated “Are people smoking in here? They shouldn't be.”    OBJECTIVE DATA SUMMARY:     Past Medical History:   Diagnosis Date    Alzheimer's dementia (HCC)     Arthritis     osteo    Diabetes (HCC)     GERD (gastroesophageal reflux disease)     Heart failure (HCC)     history of    Hypercholesteremia     Hypertension     Ill-defined condition

## 2024-06-16 NOTE — PLAN OF CARE
Problem: Safety - Adult  Goal: Free from fall injury  Outcome: Progressing  Flowsheets (Taken 6/16/2024 1848)  Free From Fall Injury: Instruct family/caregiver on patient safety     Problem: Discharge Planning  Goal: Discharge to home or other facility with appropriate resources  Outcome: Progressing  Flowsheets (Taken 6/16/2024 1848)  Discharge to home or other facility with appropriate resources: Identify barriers to discharge with patient and caregiver     Problem: Pain  Goal: Verbalizes/displays adequate comfort level or baseline comfort level  Outcome: Progressing  Flowsheets (Taken 6/16/2024 1848)  Verbalizes/displays adequate comfort level or baseline comfort level:   Encourage patient to monitor pain and request assistance   Assess pain using appropriate pain scale     Problem: Skin/Tissue Integrity  Goal: Absence of new skin breakdown  Description: 1.  Monitor for areas of redness and/or skin breakdown  2.  Assess vascular access sites hourly  3.  Every 4-6 hours minimum:  Change oxygen saturation probe site  4.  Every 4-6 hours:  If on nasal continuous positive airway pressure, respiratory therapy assess nares and determine need for appliance change or resting period.  Outcome: Progressing     Problem: Neurosensory - Adult  Goal: Achieves stable or improved neurological status  Outcome: Progressing  Flowsheets (Taken 6/16/2024 1848)  Achieves stable or improved neurological status: Assess for and report changes in neurological status  Goal: Achieves maximal functionality and self care  Outcome: Progressing  Flowsheets (Taken 6/16/2024 1848)  Achieves maximal functionality and self care:   Monitor swallowing and airway patency with patient fatigue and changes in neurological status   Encourage and assist patient to increase activity and self care with guidance from physical therapy/occupational therapy     Problem: Respiratory - Adult  Goal: Achieves optimal ventilation and oxygenation  Outcome:  Progressing  Flowsheets (Taken 6/16/2024 1848)  Achieves optimal ventilation and oxygenation:   Assess for changes in respiratory status   Assess for changes in mentation and behavior     Problem: Skin/Tissue Integrity - Adult  Goal: Skin integrity remains intact  Outcome: Progressing  Flowsheets (Taken 6/16/2024 1848)  Skin Integrity Remains Intact: Monitor for areas of redness and/or skin breakdown     Problem: Genitourinary - Adult  Goal: Absence of urinary retention  Outcome: Progressing  Flowsheets (Taken 6/16/2024 1848)  Absence of urinary retention:   Assess patient’s ability to void and empty bladder   Monitor intake/output and perform bladder scan as needed

## 2024-06-16 NOTE — PROGRESS NOTES
[facility]  Progress Note    Patient: No Alberts Age: 89 y.o. : 1935 MR#: [id number] SSN: [id number]  Date/Time: 2024    Subjective:     Free T4 0.8 Today.  TSH has improved to 45.4.    O x first name, spells it out. Not answering further questions intelligibly. Good po intake per nsg.       Assessment/Plan:     1.  Volume depletion, improving status post IV fluids.  2.  Acute metabolic encephalopathy improving, superimposed on advanced dementia.  Ammonia, B12, folate within normal limits.  CT head nil acute.  Treat hypothyroidism.  Urinalysis negative.  urine culture negative.  3.  COPD, no acute exacerbation.  Continue DuoNeb scheduled.  4. Acute hypoxic respiratory failure secondary to the above.  Titrate supplemental oxygen down and to off.  5. Advanced dementia, resume Namenda.  6. DM2 . Ssi.    7.  Mild to moderate oral dysphagia.  Diet as per SLP.  8.  Myxedema, free T4 improved.  Clinical improvement.. Serum cortisol level?  Not drawn. S iv synthroid. .  Endocrinology  input appreciated..  Daily free T4.  9. Hypertension, improving control.  Continue low-dose Norvasc.  10.  Sleep apnea, not on CPAP for unclear reasons.  11.  Hypomagnesemia replete as needed.  12.  Anemia, normocytic normochromic.  B12, folate WNL.  13.  DVT prophylaxis  14.  DNR.  Continue telemetry monitoring.  PT OT.      Oralia Tolentino (Child)  [phone]     Additional Notes:      Case discussed with:  [x]patient  []family  [x]nursing  []case management   [] discussed on IDT.   DVT Prophylaxis:  [x]Lovenox  []Hep SQ  []SCDs  []Coumadin   []On Heparin gtt   [] noac    Objective:   VS: /61   Pulse 66   Temp 98.9 °F (37.2 °C) (Oral)   Resp 16   Ht 1.626 m (5' 4\")   Wt 68 kg (150 lb)   SpO2 92%   BMI 25.75 kg/m²    Tmax/24hrs: Temp (24hrs), Av °F (36.7 °C), Min:97.5 °F (36.4 °C), Max:98.9 °F (37.2 °C)    Input/Output:   Intake/Output Summary (Last 24 hours)

## 2024-06-16 NOTE — PROGRESS NOTES
Cardiology Progress Note    Admit Date: 6/13/2024  Attending Cardiologist: Dr. Pickering    IMPRESSION  Acute hypoxic respiratory failure, in part secondary to congestive heart failure, COPD  Acute on chronic heart failure unspecified, as observed by 2D echo 6/14/2024 preserved ejection fraction 60 to 65%, EKG 6/13/2024 sinus rhythm left axis deviation, chest x-ray 6/14/2024 mild cardiomegaly small left pleural effusion versus pleural thickening, bilateral lower extremity edema  Aortic valve moderate to severe stenosis by 2D echo 6/14/2024 AV peak velocity 3.9 m/s  Hypertension -normotensive to stage II pressure  Elevated troponin 35 ng/L,, 35 ng/L, 21 ng/L, 17 ng/L  Coronary risk equivalent diabetes mellitus  Obstructive sleep apnea     PLAN  Monitor fluid status, adjust as necessary, fluid restriction, salt intake <2g per day.  Was on Lasix 20 mg p.o. daily at home, switched to 40mg po daily for home or can do 20mg po daily and 20mg po daily PRN weight gain or edema.    Recommend Guideline Directed medical therapy as can tolerate.  Aldactone currently held, metoprolol succinate held, has labile blood pressures.  Monitor blood pressure, adjust antihypertensive medications as necessary.  Continue norvasc 5mg po daily,   The patient has moderate to severe aortic valve stenosis has advanced dementia and not communicating well medical management and palliation may be best option.    Signing off patient at this time.  Please call for questions.     Thank you for this consultation and for allowing us to participate in this patient's care.     Cardiologist FABIOLA, Dr. Pickering Consult      Subjective:     Denies chest pain  Denies shortness of breath  Denies abdominal pain    Objective:      Patient Vitals for the past 8 hrs:   Temp Pulse Resp BP SpO2   06/16/24 0815 98.1 °F (36.7 °C) 65 16 126/76 95 %   06/16/24 0736 -- 65 16 -- 92 %   06/16/24 0300 97.6 °F (36.4 °C) 70 16 127/65 97 %         Patient Vitals for the past 96 hrs:    in the last 72 hours.    Invalid input(s): \"ALB\", \"TBIL\", \"AP\", \"SGOT\", \"GPT\", \"DBIL\"   Thyroid Studies No results found for: \"T4\", \"T3RU\", \"TSH\"       Signed By: ANGELI TRAVIS MD     June 16, 2024

## 2024-06-17 PROBLEM — E87.6 HYPOKALEMIA: Status: ACTIVE | Noted: 2024-06-17

## 2024-06-17 LAB
ANION GAP SERPL CALC-SCNC: 6 MMOL/L (ref 3–18)
BASOPHILS # BLD: 0 K/UL (ref 0–0.1)
BASOPHILS NFR BLD: 0 % (ref 0–2)
BUN SERPL-MCNC: 29 MG/DL (ref 7–18)
BUN/CREAT SERPL: 35 (ref 12–20)
CALCIUM SERPL-MCNC: 8.2 MG/DL (ref 8.5–10.1)
CHLORIDE SERPL-SCNC: 99 MMOL/L (ref 100–111)
CO2 SERPL-SCNC: 37 MMOL/L (ref 21–32)
CREAT SERPL-MCNC: 0.82 MG/DL (ref 0.6–1.3)
DIFFERENTIAL METHOD BLD: ABNORMAL
EOSINOPHIL # BLD: 0.2 K/UL (ref 0–0.4)
EOSINOPHIL NFR BLD: 2 % (ref 0–5)
ERYTHROCYTE [DISTWIDTH] IN BLOOD BY AUTOMATED COUNT: 16.5 % (ref 11.6–14.5)
GLUCOSE BLD STRIP.AUTO-MCNC: 165 MG/DL (ref 70–110)
GLUCOSE BLD STRIP.AUTO-MCNC: 171 MG/DL (ref 70–110)
GLUCOSE BLD STRIP.AUTO-MCNC: 204 MG/DL (ref 70–110)
GLUCOSE BLD STRIP.AUTO-MCNC: 209 MG/DL (ref 70–110)
GLUCOSE SERPL-MCNC: 149 MG/DL (ref 74–99)
HCT VFR BLD AUTO: 36.1 % (ref 35–45)
HGB BLD-MCNC: 10.7 G/DL (ref 12–16)
IMM GRANULOCYTES # BLD AUTO: 0 K/UL (ref 0–0.04)
IMM GRANULOCYTES NFR BLD AUTO: 0 % (ref 0–0.5)
LYMPHOCYTES # BLD: 2.4 K/UL (ref 0.9–3.6)
LYMPHOCYTES NFR BLD: 25 % (ref 21–52)
MCH RBC QN AUTO: 27.4 PG (ref 24–34)
MCHC RBC AUTO-ENTMCNC: 29.6 G/DL (ref 31–37)
MCV RBC AUTO: 92.6 FL (ref 78–100)
MONOCYTES # BLD: 0.4 K/UL (ref 0.05–1.2)
MONOCYTES NFR BLD: 5 % (ref 3–10)
NEUTS SEG # BLD: 6.3 K/UL (ref 1.8–8)
NEUTS SEG NFR BLD: 68 % (ref 40–73)
NRBC # BLD: 0 K/UL (ref 0–0.01)
NRBC BLD-RTO: 0 PER 100 WBC
PLATELET # BLD AUTO: 191 K/UL (ref 135–420)
PMV BLD AUTO: 9.8 FL (ref 9.2–11.8)
POTASSIUM SERPL-SCNC: 2.9 MMOL/L (ref 3.5–5.5)
POTASSIUM SERPL-SCNC: 3.7 MMOL/L (ref 3.5–5.5)
RBC # BLD AUTO: 3.9 M/UL (ref 4.2–5.3)
SODIUM SERPL-SCNC: 142 MMOL/L (ref 136–145)
T4 FREE SERPL-MCNC: 0.7 NG/DL (ref 0.7–1.5)
TSH SERPL DL<=0.05 MIU/L-ACNC: 88.9 UIU/ML (ref 0.36–3.74)
WBC # BLD AUTO: 9.3 K/UL (ref 4.6–13.2)

## 2024-06-17 PROCEDURE — 6370000000 HC RX 637 (ALT 250 FOR IP): Performed by: INTERNAL MEDICINE

## 2024-06-17 PROCEDURE — 2700000000 HC OXYGEN THERAPY PER DAY

## 2024-06-17 PROCEDURE — 2580000003 HC RX 258: Performed by: HOSPITALIST

## 2024-06-17 PROCEDURE — 82533 TOTAL CORTISOL: CPT

## 2024-06-17 PROCEDURE — 97162 PT EVAL MOD COMPLEX 30 MIN: CPT

## 2024-06-17 PROCEDURE — 2580000003 HC RX 258: Performed by: INTERNAL MEDICINE

## 2024-06-17 PROCEDURE — 84443 ASSAY THYROID STIM HORMONE: CPT

## 2024-06-17 PROCEDURE — 6360000002 HC RX W HCPCS: Performed by: HOSPITALIST

## 2024-06-17 PROCEDURE — 82962 GLUCOSE BLOOD TEST: CPT

## 2024-06-17 PROCEDURE — 84439 ASSAY OF FREE THYROXINE: CPT

## 2024-06-17 PROCEDURE — 6370000000 HC RX 637 (ALT 250 FOR IP): Performed by: HOSPITALIST

## 2024-06-17 PROCEDURE — 84132 ASSAY OF SERUM POTASSIUM: CPT

## 2024-06-17 PROCEDURE — 92526 ORAL FUNCTION THERAPY: CPT

## 2024-06-17 PROCEDURE — 94640 AIRWAY INHALATION TREATMENT: CPT

## 2024-06-17 PROCEDURE — 99232 SBSQ HOSP IP/OBS MODERATE 35: CPT | Performed by: HOSPITALIST

## 2024-06-17 PROCEDURE — 94761 N-INVAS EAR/PLS OXIMETRY MLT: CPT

## 2024-06-17 PROCEDURE — 36415 COLL VENOUS BLD VENIPUNCTURE: CPT

## 2024-06-17 PROCEDURE — 1100000003 HC PRIVATE W/ TELEMETRY

## 2024-06-17 PROCEDURE — 85025 COMPLETE CBC W/AUTO DIFF WBC: CPT

## 2024-06-17 PROCEDURE — 80048 BASIC METABOLIC PNL TOTAL CA: CPT

## 2024-06-17 PROCEDURE — 6360000002 HC RX W HCPCS: Performed by: INTERNAL MEDICINE

## 2024-06-17 RX ORDER — LIOTHYRONINE SODIUM 5 UG/1
10 TABLET ORAL DAILY
Status: DISCONTINUED | OUTPATIENT
Start: 2024-06-17 | End: 2024-06-17

## 2024-06-17 RX ORDER — LEVOTHYROXINE SODIUM 20 UG/ML
300 INJECTION, SOLUTION INTRAVENOUS DAILY
Status: DISCONTINUED | OUTPATIENT
Start: 2024-06-17 | End: 2024-06-17

## 2024-06-17 RX ORDER — LIOTHYRONINE SODIUM 5 UG/1
10 TABLET ORAL 3 TIMES DAILY
Status: DISCONTINUED | OUTPATIENT
Start: 2024-06-17 | End: 2024-06-20

## 2024-06-17 RX ADMIN — MEMANTINE HYDROCHLORIDE 10 MG: 10 TABLET ORAL at 12:07

## 2024-06-17 RX ADMIN — ARFORMOTEROL TARTRATE 15 MCG: 15 SOLUTION RESPIRATORY (INHALATION) at 07:58

## 2024-06-17 RX ADMIN — INSULIN LISPRO 1 UNITS: 100 INJECTION, SOLUTION INTRAVENOUS; SUBCUTANEOUS at 18:32

## 2024-06-17 RX ADMIN — LIOTHYRONINE SODIUM 10 MCG: 5 TABLET ORAL at 18:38

## 2024-06-17 RX ADMIN — LEVOTHYROXINE SODIUM 150 MCG: 150 TABLET ORAL at 05:17

## 2024-06-17 RX ADMIN — LIOTHYRONINE SODIUM 10 MCG: 5 TABLET ORAL at 12:06

## 2024-06-17 RX ADMIN — LEVOTHYROXINE SODIUM ANHYDROUS 300 MCG: 100 INJECTION, POWDER, LYOPHILIZED, FOR SOLUTION INTRAVENOUS at 12:06

## 2024-06-17 RX ADMIN — SODIUM CHLORIDE, PRESERVATIVE FREE 10 ML: 5 INJECTION INTRAVENOUS at 12:09

## 2024-06-17 RX ADMIN — ROSUVASTATIN CALCIUM 10 MG: 10 TABLET, COATED ORAL at 20:28

## 2024-06-17 RX ADMIN — SODIUM CHLORIDE, PRESERVATIVE FREE 10 ML: 5 INJECTION INTRAVENOUS at 20:28

## 2024-06-17 RX ADMIN — FUROSEMIDE 40 MG: 40 TABLET ORAL at 12:06

## 2024-06-17 RX ADMIN — LIOTHYRONINE SODIUM 10 MCG: 5 TABLET ORAL at 20:28

## 2024-06-17 RX ADMIN — POTASSIUM BICARBONATE 40 MEQ: 782 TABLET, EFFERVESCENT ORAL at 12:09

## 2024-06-17 RX ADMIN — ESCITALOPRAM 10 MG: 10 TABLET, FILM COATED ORAL at 12:07

## 2024-06-17 RX ADMIN — TROSPIUM CHLORIDE 20 MG: 20 TABLET, FILM COATED ORAL at 20:28

## 2024-06-17 RX ADMIN — AMLODIPINE BESYLATE 5 MG: 5 TABLET ORAL at 12:07

## 2024-06-17 RX ADMIN — BUDESONIDE 500 MCG: 0.5 INHALANT RESPIRATORY (INHALATION) at 07:58

## 2024-06-17 RX ADMIN — MEMANTINE HYDROCHLORIDE 10 MG: 10 TABLET ORAL at 20:28

## 2024-06-17 RX ADMIN — INSULIN LISPRO 1 UNITS: 100 INJECTION, SOLUTION INTRAVENOUS; SUBCUTANEOUS at 22:27

## 2024-06-17 RX ADMIN — FLUTICASONE PROPIONATE 2 SPRAY: 50 SPRAY, METERED NASAL at 12:08

## 2024-06-17 ASSESSMENT — PAIN SCALES - GENERAL
PAINLEVEL_OUTOF10: 0
PAINLEVEL_OUTOF10: 0

## 2024-06-17 NOTE — CARE COORDINATION
Pt from [facility]. Spoke with [facility] (edmond Iqbal, [phone] and confirmed pt's residency and acceptance back to facility.     Ebony Gonsalez, BSN, RN-[facility]

## 2024-06-17 NOTE — PLAN OF CARE
Problem: SLP Adult - Impaired Swallowing  Goal: By Discharge: Advance to least restrictive diet without signs or symptoms of aspiration for planned discharge setting.  See evaluation for individualized goals.  Description: Patient will:  1. Tolerate PO trials with 0 s/s overt distress in 4/5 trials  2. Utilize compensatory swallow strategies/maneuvers (decrease bite/sip, size/rate, alt. liq/sol) with min cues in 4/5 trials  3. Perform oral-motor/laryngeal exercises to increase oropharyngeal swallow function with min cues  4. Complete an objective swallow study (i.e., MBSS) to assess swallow integrity, r/o aspiration, and determine of safest LRD, min A as indicated/ordered by MD     Recommend:   Puree, thin liquids  Meds crushed in puree as able   Feeding assistance   Aspiration precautions  HOB >45 degrees during all intake and for at least 30 min after po   Small bites/sips, slow rate of intake, alternating bites/sips  Oral care post meals     Outcome: Progressing  SPEECH LANGUAGE PATHOLOGY DYSPHAGIA TREATMENT    Patient: No Alberts (89 y.o. female)  Date: 6/17/2024  Diagnosis: Acute decompensated heart failure (HCC) [I50.9]  Acute hypoxemic respiratory failure (HCC) [J96.01]  Acute on chronic congestive heart failure, unspecified heart failure type (HCC) [I50.9]  Dementia, unspecified dementia severity, unspecified dementia type, unspecified whether behavioral, psychotic, or mood disturbance or anxiety (HCC) [F03.90] Acute on chronic congestive heart failure, unspecified heart failure type (HCC)      Precautions: Standard, aspiration  PLOF: As per H&P      ASSESSMENT:  Pt seen for follow up dysphagia management. Pt continues to be lethargic with incomprehensible speech. Pt tolerated puree and thin liquids without s/s of aspiration. Delayed swallow initiation and reduced laryngeal elevation to palpation with all trials. Recommend puree and thin liquids, aspiration precautions, oral care TID, and meds in

## 2024-06-17 NOTE — PROGRESS NOTES
PHYSICAL THERAPY EVALUATION/DISCHARGE    Patient: No Alberts (89 y.o. female)  Date: 6/17/2024  Primary Diagnosis: Acute decompensated heart failure (HCC) [I50.9]  Acute hypoxemic respiratory failure (HCC) [J96.01]  Acute on chronic congestive heart failure, unspecified heart failure type (HCC) [I50.9]  Dementia, unspecified dementia severity, unspecified dementia type, unspecified whether behavioral, psychotic, or mood disturbance or anxiety ([facility]) [F03.90]    Precautions: Fall Risk, General Precautions,    PLOF: Lives in nursing home. Nonambulatory at baseline.    ASSESSMENT AND RECOMMENDATIONS:  Patient received in bed and easily aroused. Pt unable to follow simple commands to perform functional mobility tasks. Pt was only oriented to self and confused. Pt was soiled at entry, MaxA for rolling to clean up. MaxA scooting to reposition in bed. Decreased and nonfunctional BLE A/PROM in supine. Patient is not appropriate for skilled PT at this time due to cognition and inability to follow commands. Educated on need for RN assistance with mobility. Call bell in reach. Patient does not require further skilled physical therapy intervention at this level of care.    Further Equipment Recommendations for Discharge: Prior DME    AM-PAC Inpatient Mobility Raw Score : 8    This [facility] score should be considered in conjunction with interdisciplinary team recommendations to determine the most appropriate discharge setting. Patient's social support, diagnosis, medical stability, and prior level of function should also be taken into consideration.    No PT needs.      SUBJECTIVE:   Patient stated “Are you the owner?.”    OBJECTIVE DATA SUMMARY:     Past Medical History:   Diagnosis Date    Alzheimer's dementia (HCC)     Arthritis     osteo    Diabetes (HCC)     GERD (gastroesophageal reflux disease)     Heart failure (HCC)     history of    Hypercholesteremia     Hypertension     Ill-defined condition     Kidney stones

## 2024-06-17 NOTE — CARE COORDINATION
06/17/24 1531   /Social Work Whiteboard Notes   /Social Work Whiteboard --YELLOW: 6/17/24. Anticipated plan is transition back to [facility] ([place] [phone]). [facility] following. (BRIDGER Fowler [phone]). prisca-cm

## 2024-06-17 NOTE — PROGRESS NOTES
[facility]  Progress Note    Patient: No Alberts Age: 89 y.o. : 1935 MR#: [id number] SSN: [id number]  Date/Time: 2024    Subjective:     TSH 88.9. free T4 0.7.  K2.9.    Less interactive today.  Does not know her first name.  No family at bedside.      Assessment/Plan:     1.  Volume depletion, improving status post IV fluids.  2.  Acute metabolic encephalopathy, superimposed on advanced dementia.  Ammonia, B12, folate within normal limits.  CT head nil acute.  Treat hypothyroidism.  Urinalysis negative.  urine culture negative.  3.  COPD, no acute exacerbation.  Continue DuoNeb scheduled.  4. Acute hypoxic respiratory failure secondary to the above.  Titrate supplemental oxygen down and to off.  5. Advanced dementia, resume Namenda.  6. DM2 . Ssi.    7.  Mild to moderate oral dysphagia.  Diet as per SLP.  8.  Myxedema, free T4 improved.  Serum cortisol level pending. Continue iv synthroid --> switch to po when FT4 at ULN. Cytomel. Endocrinology  input appreciated..  Daily free T4, TSH.   9. Hypertension, improving control.  Continue low-dose Norvasc.  10.  Sleep apnea, not on CPAP for unclear reasons.  11.  Hypomagnesemia, hypokalemia replete as needed.  12.  Anemia, normocytic normochromic.  B12, folate WNL.  13.  DVT prophylaxis  14.  DNR.  Continue telemetry monitoring.  Return to [facility] when ready, anticipate 2 to 3 days,  to confirm she can return there.      Oralia Tolentino (Child)  [phone] updated 3:45 PM, all questions answered to the best my ability.     Additional Notes:      Case discussed with:  [x]patient  [x]family  [x]nursing  []case management   [x] discussed on IDT.   DVT Prophylaxis:  [x]Lovenox  []Hep SQ  []SCDs  []Coumadin   []On Heparin gtt   [] noac    Objective:   VS: BP (!) 104/52   Pulse 64   Temp 98 °F (36.7 °C) (Oral)   Resp 18   Ht 1.626 m (5' 4\")   Wt 68 kg (150 lb)   SpO2 98%   BMI 25.75 kg/m²

## 2024-06-18 LAB
ANION GAP SERPL CALC-SCNC: 4 MMOL/L (ref 3–18)
BUN SERPL-MCNC: 29 MG/DL (ref 7–18)
BUN/CREAT SERPL: 34 (ref 12–20)
CALCIUM SERPL-MCNC: 8.3 MG/DL (ref 8.5–10.1)
CHLORIDE SERPL-SCNC: 98 MMOL/L (ref 100–111)
CO2 SERPL-SCNC: 38 MMOL/L (ref 21–32)
CORTIS SERPL-MCNC: 16.1 UG/DL
CREAT SERPL-MCNC: 0.85 MG/DL (ref 0.6–1.3)
GLUCOSE BLD STRIP.AUTO-MCNC: 169 MG/DL (ref 70–110)
GLUCOSE BLD STRIP.AUTO-MCNC: 185 MG/DL (ref 70–110)
GLUCOSE BLD STRIP.AUTO-MCNC: 206 MG/DL (ref 70–110)
GLUCOSE BLD STRIP.AUTO-MCNC: 230 MG/DL (ref 70–110)
GLUCOSE BLD STRIP.AUTO-MCNC: 244 MG/DL (ref 70–110)
GLUCOSE SERPL-MCNC: 179 MG/DL (ref 74–99)
MAGNESIUM SERPL-MCNC: 1.5 MG/DL (ref 1.6–2.6)
PHOSPHATE SERPL-MCNC: 2.9 MG/DL (ref 2.5–4.9)
POTASSIUM SERPL-SCNC: 3.7 MMOL/L (ref 3.5–5.5)
SODIUM SERPL-SCNC: 140 MMOL/L (ref 136–145)
T4 FREE SERPL-MCNC: 1.1 NG/DL (ref 0.7–1.5)
TSH SERPL DL<=0.05 MIU/L-ACNC: 44.4 UIU/ML (ref 0.36–3.74)

## 2024-06-18 PROCEDURE — 92526 ORAL FUNCTION THERAPY: CPT

## 2024-06-18 PROCEDURE — 84443 ASSAY THYROID STIM HORMONE: CPT

## 2024-06-18 PROCEDURE — 84100 ASSAY OF PHOSPHORUS: CPT

## 2024-06-18 PROCEDURE — 2700000000 HC OXYGEN THERAPY PER DAY

## 2024-06-18 PROCEDURE — 6360000002 HC RX W HCPCS: Performed by: INTERNAL MEDICINE

## 2024-06-18 PROCEDURE — 84439 ASSAY OF FREE THYROXINE: CPT

## 2024-06-18 PROCEDURE — 99232 SBSQ HOSP IP/OBS MODERATE 35: CPT | Performed by: HOSPITALIST

## 2024-06-18 PROCEDURE — 83735 ASSAY OF MAGNESIUM: CPT

## 2024-06-18 PROCEDURE — 82962 GLUCOSE BLOOD TEST: CPT

## 2024-06-18 PROCEDURE — 6360000002 HC RX W HCPCS: Performed by: HOSPITALIST

## 2024-06-18 PROCEDURE — 94761 N-INVAS EAR/PLS OXIMETRY MLT: CPT

## 2024-06-18 PROCEDURE — 6370000000 HC RX 637 (ALT 250 FOR IP): Performed by: INTERNAL MEDICINE

## 2024-06-18 PROCEDURE — 36415 COLL VENOUS BLD VENIPUNCTURE: CPT

## 2024-06-18 PROCEDURE — 94640 AIRWAY INHALATION TREATMENT: CPT

## 2024-06-18 PROCEDURE — 1100000003 HC PRIVATE W/ TELEMETRY

## 2024-06-18 PROCEDURE — 2580000003 HC RX 258: Performed by: INTERNAL MEDICINE

## 2024-06-18 PROCEDURE — 6370000000 HC RX 637 (ALT 250 FOR IP): Performed by: HOSPITALIST

## 2024-06-18 PROCEDURE — 2580000003 HC RX 258: Performed by: HOSPITALIST

## 2024-06-18 PROCEDURE — 80048 BASIC METABOLIC PNL TOTAL CA: CPT

## 2024-06-18 RX ORDER — LEVOTHYROXINE SODIUM 40 UG/ML
300 INJECTION, SOLUTION INTRAVENOUS DAILY
Status: DISCONTINUED | OUTPATIENT
Start: 2024-06-18 | End: 2024-06-18

## 2024-06-18 RX ORDER — LANOLIN ALCOHOL/MO/W.PET/CERES
400 CREAM (GRAM) TOPICAL DAILY
Status: DISCONTINUED | OUTPATIENT
Start: 2024-06-19 | End: 2024-06-21 | Stop reason: HOSPADM

## 2024-06-18 RX ORDER — INSULIN GLARGINE 100 [IU]/ML
10 INJECTION, SOLUTION SUBCUTANEOUS
Status: DISCONTINUED | OUTPATIENT
Start: 2024-06-18 | End: 2024-06-21 | Stop reason: HOSPADM

## 2024-06-18 RX ORDER — MAGNESIUM SULFATE IN WATER 40 MG/ML
2000 INJECTION, SOLUTION INTRAVENOUS ONCE
Status: COMPLETED | OUTPATIENT
Start: 2024-06-18 | End: 2024-06-18

## 2024-06-18 RX ORDER — SODIUM CHLORIDE 0.9 % (FLUSH) 0.9 %
5 SYRINGE (ML) INJECTION DAILY
Status: DISCONTINUED | OUTPATIENT
Start: 2024-06-18 | End: 2024-06-18

## 2024-06-18 RX ORDER — LEVOTHYROXINE SODIUM ANHYDROUS 200 UG/5ML
300 INJECTION, POWDER, LYOPHILIZED, FOR SOLUTION INTRAVENOUS DAILY
Status: DISCONTINUED | OUTPATIENT
Start: 2024-06-18 | End: 2024-06-18

## 2024-06-18 RX ADMIN — BUDESONIDE 500 MCG: 0.5 INHALANT RESPIRATORY (INHALATION) at 20:14

## 2024-06-18 RX ADMIN — AMLODIPINE BESYLATE 5 MG: 5 TABLET ORAL at 10:55

## 2024-06-18 RX ADMIN — LEVOTHYROXINE SODIUM ANHYDROUS 300 MCG: 200 INJECTION, POWDER, LYOPHILIZED, FOR SOLUTION INTRAVENOUS at 11:09

## 2024-06-18 RX ADMIN — MAGNESIUM SULFATE HEPTAHYDRATE 2000 MG: 40 INJECTION, SOLUTION INTRAVENOUS at 17:01

## 2024-06-18 RX ADMIN — BUDESONIDE 500 MCG: 0.5 INHALANT RESPIRATORY (INHALATION) at 08:25

## 2024-06-18 RX ADMIN — INSULIN GLARGINE 10 UNITS: 100 INJECTION, SOLUTION SUBCUTANEOUS at 17:01

## 2024-06-18 RX ADMIN — INSULIN LISPRO 1 UNITS: 100 INJECTION, SOLUTION INTRAVENOUS; SUBCUTANEOUS at 17:02

## 2024-06-18 RX ADMIN — LIOTHYRONINE SODIUM 10 MCG: 5 TABLET ORAL at 10:58

## 2024-06-18 RX ADMIN — ESCITALOPRAM 10 MG: 10 TABLET, FILM COATED ORAL at 10:54

## 2024-06-18 RX ADMIN — SODIUM CHLORIDE, PRESERVATIVE FREE 10 ML: 5 INJECTION INTRAVENOUS at 09:17

## 2024-06-18 RX ADMIN — ARFORMOTEROL TARTRATE 15 MCG: 15 SOLUTION RESPIRATORY (INHALATION) at 20:14

## 2024-06-18 RX ADMIN — ARFORMOTEROL TARTRATE 15 MCG: 15 SOLUTION RESPIRATORY (INHALATION) at 08:25

## 2024-06-18 RX ADMIN — LIOTHYRONINE SODIUM 10 MCG: 5 TABLET ORAL at 20:36

## 2024-06-18 RX ADMIN — INSULIN LISPRO 1 UNITS: 100 INJECTION, SOLUTION INTRAVENOUS; SUBCUTANEOUS at 11:03

## 2024-06-18 RX ADMIN — FUROSEMIDE 40 MG: 40 TABLET ORAL at 10:57

## 2024-06-18 RX ADMIN — MEMANTINE HYDROCHLORIDE 10 MG: 10 TABLET ORAL at 20:36

## 2024-06-18 RX ADMIN — FLUTICASONE PROPIONATE 2 SPRAY: 50 SPRAY, METERED NASAL at 10:56

## 2024-06-18 RX ADMIN — TROSPIUM CHLORIDE 20 MG: 20 TABLET, FILM COATED ORAL at 20:36

## 2024-06-18 RX ADMIN — LIOTHYRONINE SODIUM 10 MCG: 5 TABLET ORAL at 13:58

## 2024-06-18 RX ADMIN — SODIUM CHLORIDE, PRESERVATIVE FREE 10 ML: 5 INJECTION INTRAVENOUS at 20:37

## 2024-06-18 RX ADMIN — MEMANTINE HYDROCHLORIDE 10 MG: 10 TABLET ORAL at 10:55

## 2024-06-18 RX ADMIN — ROSUVASTATIN CALCIUM 10 MG: 10 TABLET, COATED ORAL at 20:37

## 2024-06-18 ASSESSMENT — PAIN SCALES - GENERAL
PAINLEVEL_OUTOF10: 0

## 2024-06-18 NOTE — CARE COORDINATION
06/18/24 1152   /Social Work Whiteboard Notes   /Social Work Whiteboard RED: 6/18/24. Not medically stable for transition to next level of care. hyperthyroidism. Pt to return to \[facility]"" [facility] ([place], [phone]) [facility] following, (BRIDGER Fowler [phone]) dpj-cm

## 2024-06-18 NOTE — PROGRESS NOTES
Physician Progress Note      PATIENT:               GAGE FELIZ  CSN #:                  [id number]  :                       1935  ADMIT DATE:       2024 9:25 PM  DISCH DATE:  RESPONDING  PROVIDER #:        Kaitlynn Zhou MD          QUERY TEXT:    Pt admitted with Acute CHF exacerbation on H&P which has dropped from   attending documentation, noted to have Acute on chronic heart failure on   cardio  pn and Volume depletion on  Dr Andrade pn.    Please document in progress notes and discharge summary further specificity   regarding the type and acuity of CHF:    The medical record reflects the following:    Risk Factors:  89-year-old with HTN and Aortic valve moderate to severe   stenosis    Clinical Indicators:  --No JVD present. (H&P)  --CXR: cardiomegaly with pleural effusion vs pleural thickening  --2D echo 2024 preserved ejection fraction 60 to 65% with Indeterminate   diastolic function.    Treatment: CXR, Echo, cardio consult, fluid restriction, salt intake <2g per   day.  IV Lasix 40 mg qd.  \"Was on Lasix 20 mg p.o. daily at home, switched to 40mg po daily for home or   can do 20mg po daily and 20mg po daily PRN weight gain or edema.  \" per marisa    Thank you,  Sulema Finley RN, CDI, CCDS  Options provided:  -- Acute on Chronic Diastolic CHF/HFpEF  -- Other - I will add my own diagnosis  -- Disagree - Not applicable / Not valid  -- Disagree - Clinically unable to determine / Unknown  -- Refer to Clinical Documentation Reviewer    PROVIDER RESPONSE TEXT:    Provider disagreed with this query.    Query created by: SULEMA FINLEY on 2024 12:38 PM      Electronically signed by:  Kaitlynn Zhou MD 2024 11:16 AM

## 2024-06-18 NOTE — PROGRESS NOTES
[facility]  Progress Note    Patient: No Alberts Age: 89 y.o. : 1935 MR#: [id number] SSN: [id number]  Date/Time: 2024    Subjective:     TSH 88.9. free T4 0.7.  K 2.9.    Patient seen and examined at bedside, she states her first and last name.  Not answering further questions.  Not following commands.  Ate 100% of breakfast per CNA at bedside.  She is not constipated, per nursing.    No family at bedside.    Assessment/Plan:     1.  Volume depletion, improving status post IV fluids.  2.  Acute metabolic encephalopathy, superimposed on advanced dementia.  Ammonia, B12, folate within normal limits.  CT head nil acute.  Treat hypothyroidism.  Urinalysis negative. urine culture negative.  3.  COPD, no acute exacerbation.  Continue DuoNeb scheduled.  4. Acute hypoxic respiratory failure secondary to the above.  Titrate supplemental oxygen down and to off.  5. Advanced dementia, resume Namenda.  6. DM2, with steroid-induced hyperglycemia.  Lantus, ADA diet, Ssi.    7.  Mild to moderate oral dysphagia.  Diet as per SLP.  8.  Myxedema, free T4 improving .  Random serum cortisol level 16.1. Continue iv synthroid --> switch to po when FT4 at ULN. Cytomel. Endocrinology  input appreciated..  Daily free T4, TSH.   9. Hypertension, improving control.  Continue low-dose Norvasc.  10.  Sleep apnea, not on CPAP for unclear reasons.  11.  Hypomagnesemia, hypokalemia replete as needed.  12.  Anemia, normocytic normochromic.  B12, folate WNL.  13.  Metabolic alkalosis.  14.  DVT prophylaxis  15.  DNR.  Continue telemetry monitoring.  Return to [facility] when ready, anticipate 2 to 3 days,  to confirm she can return there.      Oralia Tolentino (Child)  [phone] updated 4:30 PM, all questions answered to the best my ability.     Additional Notes:      Case discussed with:  [x]patient  [x]family  [x]nursing  []case management   [x] discussed on IDT.   DVT

## 2024-06-18 NOTE — PLAN OF CARE
Problem: SLP Adult - Impaired Swallowing  Goal: By Discharge: Advance to least restrictive diet without signs or symptoms of aspiration for planned discharge setting.  See evaluation for individualized goals.  Description: Patient will:  1. Tolerate PO trials with 0 s/s overt distress in 4/5 trials  2. Utilize compensatory swallow strategies/maneuvers (decrease bite/sip, size/rate, alt. liq/sol) with min cues in 4/5 trials  3. Perform oral-motor/laryngeal exercises to increase oropharyngeal swallow function with min cues  4. Complete an objective swallow study (i.e., MBSS) to assess swallow integrity, r/o aspiration, and determine of safest LRD, min A as indicated/ordered by MD     Recommend:   Puree, thin liquids  Meds crushed in puree as able   Feeding assistance   Aspiration precautions  HOB >45 degrees during all intake and for at least 30 min after po   Small bites/sips, slow rate of intake, alternating bites/sips  Oral care post meals     6/18/2024 1049 by Nellie Clemons, SLP  Outcome: Progressing  SPEECH LANGUAGE PATHOLOGY DYSPHAGIA TREATMENT    Patient: No Alberts (89 y.o. female)  Date: 6/18/2024  Diagnosis: Acute decompensated heart failure (HCC) [I50.9]  Acute hypoxemic respiratory failure (HCC) [J96.01]  Acute on chronic congestive heart failure, unspecified heart failure type (HCC) [I50.9]  Dementia, unspecified dementia severity, unspecified dementia type, unspecified whether behavioral, psychotic, or mood disturbance or anxiety (HCC) [F03.90] Acute on chronic congestive heart failure, unspecified heart failure type (HCC)      Precautions: Standard, aspiration  PLOF: As per H&P      ASSESSMENT:  Pt seen for follow up dysphagia management. Pt required max cues/encouragement to arouse for PO trials. Pt able to tolerate thin liquids + straw without overt s/s of aspiration. Positive oral clearance, delayed swallow initiation, and reduced laryngeal elevation to palpation with PO. Further trials  and thickener instructions provided.  [x]         Posted safety precautions in patient's room.    Thank you for this referral.    Nellie Clemons M.S. CCC-SLP  Speech Language Pathologist

## 2024-06-18 NOTE — PLAN OF CARE
Problem: Safety - Adult  Goal: Free from fall injury  Outcome: Progressing     Problem: Discharge Planning  Goal: Discharge to home or other facility with appropriate resources  Outcome: Progressing  Flowsheets (Taken 6/17/2024 0840 by Philomena Joe, RN)  Discharge to home or other facility with appropriate resources: Identify barriers to discharge with patient and caregiver     Problem: Pain  Goal: Verbalizes/displays adequate comfort level or baseline comfort level  Outcome: Progressing     Problem: Skin/Tissue Integrity  Goal: Absence of new skin breakdown  Description: 1.  Monitor for areas of redness and/or skin breakdown  2.  Assess vascular access sites hourly  3.  Every 4-6 hours minimum:  Change oxygen saturation probe site  4.  Every 4-6 hours:  If on nasal continuous positive airway pressure, respiratory therapy assess nares and determine need for appliance change or resting period.  Outcome: Progressing     Problem: SLP Adult - Impaired Swallowing  Goal: By Discharge: Advance to least restrictive diet without signs or symptoms of aspiration for planned discharge setting.  See evaluation for individualized goals.  Description: Patient will:  1. Tolerate PO trials with 0 s/s overt distress in 4/5 trials  2. Utilize compensatory swallow strategies/maneuvers (decrease bite/sip, size/rate, alt. liq/sol) with min cues in 4/5 trials  3. Perform oral-motor/laryngeal exercises to increase oropharyngeal swallow function with min cues  4. Complete an objective swallow study (i.e., MBSS) to assess swallow integrity, r/o aspiration, and determine of safest LRD, min A as indicated/ordered by MD     Recommend:   Puree, thin liquids  Meds crushed in puree as able   Feeding assistance   Aspiration precautions  HOB >45 degrees during all intake and for at least 30 min after po   Small bites/sips, slow rate of intake, alternating bites/sips  Oral care post meals     6/17/2024 1429 by Nellie Clemons,

## 2024-06-19 LAB
ANION GAP SERPL CALC-SCNC: 5 MMOL/L (ref 3–18)
BUN SERPL-MCNC: 29 MG/DL (ref 7–18)
BUN/CREAT SERPL: 32 (ref 12–20)
CALCIUM SERPL-MCNC: 8.5 MG/DL (ref 8.5–10.1)
CHLORIDE SERPL-SCNC: 98 MMOL/L (ref 100–111)
CO2 SERPL-SCNC: 38 MMOL/L (ref 21–32)
CREAT SERPL-MCNC: 0.9 MG/DL (ref 0.6–1.3)
GLUCOSE BLD STRIP.AUTO-MCNC: 137 MG/DL (ref 70–110)
GLUCOSE BLD STRIP.AUTO-MCNC: 146 MG/DL (ref 70–110)
GLUCOSE BLD STRIP.AUTO-MCNC: 185 MG/DL (ref 70–110)
GLUCOSE BLD STRIP.AUTO-MCNC: 202 MG/DL (ref 70–110)
GLUCOSE SERPL-MCNC: 172 MG/DL (ref 74–99)
MAGNESIUM SERPL-MCNC: 1.8 MG/DL (ref 1.6–2.6)
PHOSPHATE SERPL-MCNC: 3.4 MG/DL (ref 2.5–4.9)
POTASSIUM SERPL-SCNC: 3.5 MMOL/L (ref 3.5–5.5)
SODIUM SERPL-SCNC: 141 MMOL/L (ref 136–145)
T4 FREE SERPL-MCNC: 1.3 NG/DL (ref 0.7–1.5)
TSH SERPL DL<=0.05 MIU/L-ACNC: 30.6 UIU/ML (ref 0.36–3.74)

## 2024-06-19 PROCEDURE — 6360000002 HC RX W HCPCS: Performed by: INTERNAL MEDICINE

## 2024-06-19 PROCEDURE — 84100 ASSAY OF PHOSPHORUS: CPT

## 2024-06-19 PROCEDURE — 1100000003 HC PRIVATE W/ TELEMETRY

## 2024-06-19 PROCEDURE — 6370000000 HC RX 637 (ALT 250 FOR IP): Performed by: HOSPITALIST

## 2024-06-19 PROCEDURE — 99232 SBSQ HOSP IP/OBS MODERATE 35: CPT | Performed by: HOSPITALIST

## 2024-06-19 PROCEDURE — 82962 GLUCOSE BLOOD TEST: CPT

## 2024-06-19 PROCEDURE — 2580000003 HC RX 258: Performed by: INTERNAL MEDICINE

## 2024-06-19 PROCEDURE — 84443 ASSAY THYROID STIM HORMONE: CPT

## 2024-06-19 PROCEDURE — 94640 AIRWAY INHALATION TREATMENT: CPT

## 2024-06-19 PROCEDURE — 83735 ASSAY OF MAGNESIUM: CPT

## 2024-06-19 PROCEDURE — 6360000002 HC RX W HCPCS: Performed by: HOSPITALIST

## 2024-06-19 PROCEDURE — 94761 N-INVAS EAR/PLS OXIMETRY MLT: CPT

## 2024-06-19 PROCEDURE — 6370000000 HC RX 637 (ALT 250 FOR IP): Performed by: INTERNAL MEDICINE

## 2024-06-19 PROCEDURE — 84439 ASSAY OF FREE THYROXINE: CPT

## 2024-06-19 PROCEDURE — 94760 N-INVAS EAR/PLS OXIMETRY 1: CPT

## 2024-06-19 PROCEDURE — 2700000000 HC OXYGEN THERAPY PER DAY

## 2024-06-19 PROCEDURE — 92526 ORAL FUNCTION THERAPY: CPT

## 2024-06-19 PROCEDURE — 36415 COLL VENOUS BLD VENIPUNCTURE: CPT

## 2024-06-19 PROCEDURE — 2580000003 HC RX 258: Performed by: HOSPITALIST

## 2024-06-19 PROCEDURE — 80048 BASIC METABOLIC PNL TOTAL CA: CPT

## 2024-06-19 RX ADMIN — LIOTHYRONINE SODIUM 10 MCG: 5 TABLET ORAL at 09:32

## 2024-06-19 RX ADMIN — ARFORMOTEROL TARTRATE 15 MCG: 15 SOLUTION RESPIRATORY (INHALATION) at 20:07

## 2024-06-19 RX ADMIN — INSULIN GLARGINE 10 UNITS: 100 INJECTION, SOLUTION SUBCUTANEOUS at 17:53

## 2024-06-19 RX ADMIN — SODIUM CHLORIDE, PRESERVATIVE FREE 10 ML: 5 INJECTION INTRAVENOUS at 23:16

## 2024-06-19 RX ADMIN — POTASSIUM BICARBONATE 40 MEQ: 782 TABLET, EFFERVESCENT ORAL at 11:32

## 2024-06-19 RX ADMIN — LIOTHYRONINE SODIUM 10 MCG: 5 TABLET ORAL at 15:42

## 2024-06-19 RX ADMIN — FLUTICASONE PROPIONATE 2 SPRAY: 50 SPRAY, METERED NASAL at 18:03

## 2024-06-19 RX ADMIN — Medication 400 MG: at 09:33

## 2024-06-19 RX ADMIN — ESCITALOPRAM 10 MG: 10 TABLET, FILM COATED ORAL at 09:33

## 2024-06-19 RX ADMIN — SODIUM CHLORIDE, PRESERVATIVE FREE 10 ML: 5 INJECTION INTRAVENOUS at 09:34

## 2024-06-19 RX ADMIN — INSULIN LISPRO 1 UNITS: 100 INJECTION, SOLUTION INTRAVENOUS; SUBCUTANEOUS at 17:52

## 2024-06-19 RX ADMIN — MEMANTINE HYDROCHLORIDE 10 MG: 10 TABLET ORAL at 09:33

## 2024-06-19 RX ADMIN — FUROSEMIDE 40 MG: 40 TABLET ORAL at 09:32

## 2024-06-19 RX ADMIN — BUDESONIDE 500 MCG: 0.5 INHALANT RESPIRATORY (INHALATION) at 20:07

## 2024-06-19 RX ADMIN — ARFORMOTEROL TARTRATE 15 MCG: 15 SOLUTION RESPIRATORY (INHALATION) at 08:14

## 2024-06-19 RX ADMIN — AMLODIPINE BESYLATE 5 MG: 5 TABLET ORAL at 09:33

## 2024-06-19 RX ADMIN — LEVOTHYROXINE SODIUM ANHYDROUS 300 MCG: 200 INJECTION, POWDER, LYOPHILIZED, FOR SOLUTION INTRAVENOUS at 11:33

## 2024-06-19 RX ADMIN — BUDESONIDE 500 MCG: 0.5 INHALANT RESPIRATORY (INHALATION) at 08:14

## 2024-06-19 ASSESSMENT — PAIN SCALES - GENERAL
PAINLEVEL_OUTOF10: 0
PAINLEVEL_OUTOF10: 1

## 2024-06-19 ASSESSMENT — PAIN SCALES - PAIN ASSESSMENT IN ADVANCED DEMENTIA (PAINAD)
BODYLANGUAGE: RELAXED
TOTALSCORE: 0
CONSOLABILITY: NO NEED TO CONSOLE
FACIALEXPRESSION: SMILING OR INEXPRESSIVE
BREATHING: NORMAL

## 2024-06-19 NOTE — CARE COORDINATION
06/19/24 1219   /Social Work Whiteboard Notes   /Social Work Whiteboard RED: 6/19/24. Anticpate transiiton back to [facility] when medically stable. Pt is [facility]. YINA Hunter [phone], [phone]. transport is on will call. jordan

## 2024-06-19 NOTE — PLAN OF CARE
Problem: SLP Adult - Impaired Swallowing  Goal: By Discharge: Advance to least restrictive diet without signs or symptoms of aspiration for planned discharge setting.  See evaluation for individualized goals.  Description: Patient will:  1. Tolerate PO trials with 0 s/s overt distress in 4/5 trials  2. Utilize compensatory swallow strategies/maneuvers (decrease bite/sip, size/rate, alt. liq/sol) with min cues in 4/5 trials  3. Perform oral-motor/laryngeal exercises to increase oropharyngeal swallow function with min cues  4. Complete an objective swallow study (i.e., MBSS) to assess swallow integrity, r/o aspiration, and determine of safest LRD, min A as indicated/ordered by MD     Recommend:   Puree, thin liquids  Meds crushed in puree as able   Feeding assistance   Aspiration precautions  HOB >45 degrees during all intake and for at least 30 min after po   Small bites/sips, slow rate of intake, alternating bites/sips  Oral care post meals     6/19/2024 1218 by Ana Angelo  Outcome: Not Progressing  6/19/2024 1217 by Ana Angelo  Outcome: Progressing     Problem: SLP Adult - Impaired Swallowing  Goal: By Discharge: Advance to least restrictive diet without signs or symptoms of aspiration for planned discharge setting.  See evaluation for individualized goals.  Description: Patient will:  1. Tolerate PO trials with 0 s/s overt distress in 4/5 trials  2. Utilize compensatory swallow strategies/maneuvers (decrease bite/sip, size/rate, alt. liq/sol) with min cues in 4/5 trials  3. Perform oral-motor/laryngeal exercises to increase oropharyngeal swallow function with min cues  4. Complete an objective swallow study (i.e., MBSS) to assess swallow integrity, r/o aspiration, and determine of safest LRD, min A as indicated/ordered by MD     Recommend:   Puree, thin liquids  Meds crushed in puree as able   Feeding assistance   Aspiration precautions  HOB >45 degrees during all intake and for at least 30 min after

## 2024-06-19 NOTE — PROGRESS NOTES
VS stable. Pt asleep. Did not awaken her.   Free T4 is up to 1.3. After tomorrow's dose of IV thyroxine, suggest she be switched to oral at a dose of 150 mcg per day. Cytomel an then be reduced to 5 mcg twice a day.

## 2024-06-19 NOTE — CASE COMMUNICATION
Call made to [employer] [place] transportation [phone], spoke with Enmanuel, Patient is a [facility].

## 2024-06-19 NOTE — PLAN OF CARE
Problem: SLP Adult - Impaired Swallowing  Goal: By Discharge: Advance to least restrictive diet without signs or symptoms of aspiration for planned discharge setting.  See evaluation for individualized goals.  Description: Patient will:  1. Tolerate PO trials with 0 s/s overt distress in 4/5 trials  2. Utilize compensatory swallow strategies/maneuvers (decrease bite/sip, size/rate, alt. liq/sol) with min cues in 4/5 trials  3. Perform oral-motor/laryngeal exercises to increase oropharyngeal swallow function with min cues  4. Complete an objective swallow study (i.e., MBSS) to assess swallow integrity, r/o aspiration, and determine of safest LRD, min A as indicated/ordered by MD     Recommend:   Puree, thin liquids  Meds crushed in puree as able   Feeding assistance   Aspiration precautions  HOB >45 degrees during all intake and for at least 30 min after po   Small bites/sips, slow rate of intake, alternating bites/sips  Oral care post meals     6/19/2024 1218 by Ana Angelo  Outcome: Not Progressing  SPEECH LANGUAGE PATHOLOGY DYSPHAGIA TREATMENT    Patient: No Alberts (89 y.o. female)  Date: 6/19/2024  Diagnosis: Acute decompensated heart failure (HCC) [I50.9]  Acute hypoxemic respiratory failure (HCC) [J96.01]  Acute on chronic congestive heart failure, unspecified heart failure type (HCC) [I50.9]  Dementia, unspecified dementia severity, unspecified dementia type, unspecified whether behavioral, psychotic, or mood disturbance or anxiety (HCC) [F03.90] Acute on chronic congestive heart failure, unspecified heart failure type (HCC)      Precautions: Standard, aspiration  PLOF: As per H&P      ASSESSMENT:  Pt seen at bedside for follow up dysphagia management. Pt required max cues/encouragement to arouse for PO trials. Pt demonstrated overt weak cough and throat clearing with mix consistency (cereal) with all PO trials. Pt tolerated puree solids and thin liquids without any overt s/sx of aspiration with  goal setting and plan of care.  []            Patient/family agree to work toward stated goals and plan of care.  []            Patient understands intent and goals of therapy, neutral about participation.  []            Patient unable to participate in goal setting/plan of care secondary to cognition, hearing/vision deficits; education ongoing with interdisciplinary staff   []            Handout regarding diet recommendations and thickener instructions provided.  [x]         Posted safety precautions in patient's room.    Thank you for this referral,     Ana Angelo  Speech Pathology Student

## 2024-06-19 NOTE — CARE COORDINATION
Patient is Elias SHARPE, call made to [employer] transportation [phone], scheduled patient for  on 6/21/2024 at 3:00 pm.

## 2024-06-19 NOTE — PLAN OF CARE
Problem: Safety - Adult  Goal: Free from fall injury  Outcome: Progressing     Problem: Discharge Planning  Goal: Discharge to home or other facility with appropriate resources  Outcome: Progressing     Problem: Pain  Goal: Verbalizes/displays adequate comfort level or baseline comfort level  Outcome: Progressing     Problem: Skin/Tissue Integrity  Goal: Absence of new skin breakdown  Description: 1.  Monitor for areas of redness and/or skin breakdown  2.  Assess vascular access sites hourly  3.  Every 4-6 hours minimum:  Change oxygen saturation probe site  4.  Every 4-6 hours:  If on nasal continuous positive airway pressure, respiratory therapy assess nares and determine need for appliance change or resting period.  Outcome: Progressing     Problem: Neurosensory - Adult  Goal: Achieves stable or improved neurological status  Outcome: Progressing  Flowsheets (Taken 6/18/2024 2000)  Achieves stable or improved neurological status: Assess for and report changes in neurological status  Goal: Achieves maximal functionality and self care  Outcome: Progressing  Flowsheets (Taken 6/18/2024 2000)  Achieves maximal functionality and self care: Monitor swallowing and airway patency with patient fatigue and changes in neurological status     Problem: Respiratory - Adult  Goal: Achieves optimal ventilation and oxygenation  Outcome: Progressing     Problem: Skin/Tissue Integrity - Adult  Goal: Skin integrity remains intact  Outcome: Progressing  Flowsheets (Taken 6/18/2024 2000)  Skin Integrity Remains Intact: Monitor for areas of redness and/or skin breakdown     Problem: Genitourinary - Adult  Goal: Absence of urinary retention  Outcome: Progressing  Flowsheets (Taken 6/18/2024 2000)  Absence of urinary retention: Assess patient’s ability to void and empty bladder     Problem: Chronic Conditions and Co-morbidities  Goal: Patient's chronic conditions and co-morbidity symptoms are monitored and maintained or

## 2024-06-19 NOTE — CARE COORDINATION
Requested Case Management specialist to assist with transportation to [facility]  Address is [address], [place] [address] and phone number is [phone]  Patient will require BLS transport.   Pt requires Stretcher If stretcher, reason: Deconditioning r/t dx process, CHF  Patient is currently requiring oxygen Yes , 2lnc  Height:5'4\"   Weight: 150 lbs  Pt is on isolation: No Isolation is for: n/a  Is the pt ready now? no  Requested time: Will Call for Friday, 6/21/24  PCS Faxed: No  Insurance verified on face sheet: Yes  Auth needed for transport: Yes  CM completed PCS/ Envelope and placed on chart.      Ebony Gonsalez, BSN, RN-CM  [facility]

## 2024-06-19 NOTE — PROGRESS NOTES
[facility]  Progress Note    Patient: No Alberts Age: 89 y.o. : 1935 MR#: [id number] SSN: [id number]  Date/Time: 2024    Subjective:     TSH 30.6. free T4 1.3.    Patient seen and examined at bedside, she is oriented to self, first and last name.  Not answering other questions.  Continues with excellent p.o. intake per nursing.  No family at bedside.      Assessment/Plan:     1.  Volume depletion, improving status post IV fluids.  2.  Acute metabolic encephalopathy, superimposed on advanced dementia.  Ammonia, B12, folate within normal limits.  CT head nil acute.  Treat hypothyroidism.  Urinalysis negative. urine culture negative.  3.  COPD, no acute exacerbation.  Continue DuoNeb scheduled.  4. Acute hypoxic respiratory failure secondary to the above.  Titrate supplemental oxygen down and to off.  5. Advanced dementia, resume Namenda.  6. DM2, with steroid-induced hyperglycemia.  Lantus, ADA diet, Ssi.    7.  Mild to moderate oral dysphagia.  Diet as per SLP.  8.  Myxedema, free T4 improving .  Random serum cortisol level 16.1. Continue iv synthroid --> switch to po when FT4 at ULN. Cytomel. Endocrinology  input appreciated..  Daily free T4, TSH.   9. Hypertension, improving control.  Continue low-dose Norvasc.  10.  Sleep apnea, not on CPAP for unclear reasons.  11.  Hypomagnesemia, hypokalemia replete as needed.  12.  Anemia, normocytic normochromic.  B12, folate WNL.  13.  Metabolic alkalosis.  14.  DVT prophylaxis  15.  DNR.  Continue telemetry monitoring.  Return to hospitals when ready, anticipate 2 to 3 days,  to confirm she can return there.      Oralia Tolentino (Child)  [phone]      Additional Notes:      Case discussed with:  [x]patient  []family  [x]nursing  []case management   [x] discussed on IDT.   DVT Prophylaxis:  [x]Lovenox  []Hep SQ  []SCDs  []Coumadin   []On Heparin gtt   [] noac    Objective:   VS: /61   Pulse  no

## 2024-06-20 VITALS
RESPIRATION RATE: 18 BRPM | WEIGHT: 150 LBS | OXYGEN SATURATION: 95 % | HEIGHT: 64 IN | TEMPERATURE: 99.2 F | SYSTOLIC BLOOD PRESSURE: 120 MMHG | DIASTOLIC BLOOD PRESSURE: 70 MMHG | HEART RATE: 86 BPM | BODY MASS INDEX: 25.61 KG/M2

## 2024-06-20 LAB
ANION GAP SERPL CALC-SCNC: 3 MMOL/L (ref 3–18)
BUN SERPL-MCNC: 27 MG/DL (ref 7–18)
BUN/CREAT SERPL: 38 (ref 12–20)
CALCIUM SERPL-MCNC: 8.3 MG/DL (ref 8.5–10.1)
CHLORIDE SERPL-SCNC: 99 MMOL/L (ref 100–111)
CO2 SERPL-SCNC: 40 MMOL/L (ref 21–32)
CREAT SERPL-MCNC: 0.71 MG/DL (ref 0.6–1.3)
FSH SERPL-ACNC: 45.5 MIU/ML
GLUCOSE BLD STRIP.AUTO-MCNC: 132 MG/DL (ref 70–110)
GLUCOSE BLD STRIP.AUTO-MCNC: 234 MG/DL (ref 70–110)
GLUCOSE SERPL-MCNC: 173 MG/DL (ref 74–99)
LH SERPL-ACNC: 19.8 MIU/ML
PHOSPHATE SERPL-MCNC: 3.1 MG/DL (ref 2.5–4.9)
POTASSIUM SERPL-SCNC: 4 MMOL/L (ref 3.5–5.5)
SODIUM SERPL-SCNC: 142 MMOL/L (ref 136–145)
T3FREE SERPL-MCNC: 2.3 PG/ML (ref 2.18–3.98)
T4 FREE SERPL-MCNC: 1.6 NG/DL (ref 0.7–1.5)
TSH SERPL DL<=0.05 MIU/L-ACNC: 11.6 UIU/ML (ref 0.36–3.74)

## 2024-06-20 PROCEDURE — 83001 ASSAY OF GONADOTROPIN (FSH): CPT

## 2024-06-20 PROCEDURE — 94761 N-INVAS EAR/PLS OXIMETRY MLT: CPT

## 2024-06-20 PROCEDURE — 84439 ASSAY OF FREE THYROXINE: CPT

## 2024-06-20 PROCEDURE — 6370000000 HC RX 637 (ALT 250 FOR IP): Performed by: HOSPITALIST

## 2024-06-20 PROCEDURE — 84100 ASSAY OF PHOSPHORUS: CPT

## 2024-06-20 PROCEDURE — 84443 ASSAY THYROID STIM HORMONE: CPT

## 2024-06-20 PROCEDURE — 2700000000 HC OXYGEN THERAPY PER DAY

## 2024-06-20 PROCEDURE — 6360000002 HC RX W HCPCS: Performed by: HOSPITALIST

## 2024-06-20 PROCEDURE — 84305 ASSAY OF SOMATOMEDIN: CPT

## 2024-06-20 PROCEDURE — 84481 FREE ASSAY (FT-3): CPT

## 2024-06-20 PROCEDURE — 99239 HOSP IP/OBS DSCHRG MGMT >30: CPT | Performed by: HOSPITALIST

## 2024-06-20 PROCEDURE — 6360000002 HC RX W HCPCS: Performed by: INTERNAL MEDICINE

## 2024-06-20 PROCEDURE — 6370000000 HC RX 637 (ALT 250 FOR IP): Performed by: INTERNAL MEDICINE

## 2024-06-20 PROCEDURE — 2580000003 HC RX 258: Performed by: INTERNAL MEDICINE

## 2024-06-20 PROCEDURE — 36415 COLL VENOUS BLD VENIPUNCTURE: CPT

## 2024-06-20 PROCEDURE — 80048 BASIC METABOLIC PNL TOTAL CA: CPT

## 2024-06-20 PROCEDURE — 82962 GLUCOSE BLOOD TEST: CPT

## 2024-06-20 PROCEDURE — 83002 ASSAY OF GONADOTROPIN (LH): CPT

## 2024-06-20 PROCEDURE — 2580000003 HC RX 258: Performed by: HOSPITALIST

## 2024-06-20 PROCEDURE — 94640 AIRWAY INHALATION TREATMENT: CPT

## 2024-06-20 RX ORDER — LIOTHYRONINE SODIUM 5 UG/1
5 TABLET ORAL 2 TIMES DAILY
Qty: 30 TABLET | Refills: 1 | Status: SHIPPED | DISCHARGE
Start: 2024-06-20

## 2024-06-20 RX ORDER — LIOTHYRONINE SODIUM 5 UG/1
10 TABLET ORAL DAILY
Status: DISCONTINUED | OUTPATIENT
Start: 2024-06-21 | End: 2024-06-21 | Stop reason: HOSPADM

## 2024-06-20 RX ORDER — AMLODIPINE BESYLATE 5 MG/1
5 TABLET ORAL DAILY
Qty: 30 TABLET | Refills: 0 | Status: SHIPPED | DISCHARGE
Start: 2024-06-21

## 2024-06-20 RX ORDER — LIOTHYRONINE SODIUM 5 UG/1
10 TABLET ORAL DAILY
Qty: 30 TABLET | Refills: 1 | Status: SHIPPED | DISCHARGE
Start: 2024-06-21 | End: 2024-06-20

## 2024-06-20 RX ORDER — POTASSIUM CHLORIDE 20 MEQ/1
10 TABLET, EXTENDED RELEASE ORAL DAILY
Qty: 30 TABLET | Refills: 0 | Status: SHIPPED | DISCHARGE
Start: 2024-06-20

## 2024-06-20 RX ORDER — FUROSEMIDE 40 MG/1
40 TABLET ORAL DAILY
Status: SHIPPED | DISCHARGE
Start: 2024-06-20

## 2024-06-20 RX ORDER — LEVOTHYROXINE SODIUM 0.15 MG/1
150 TABLET ORAL
Qty: 30 TABLET | Status: SHIPPED | DISCHARGE
Start: 2024-06-20

## 2024-06-20 RX ADMIN — Medication 400 MG: at 09:50

## 2024-06-20 RX ADMIN — INSULIN GLARGINE 10 UNITS: 100 INJECTION, SOLUTION SUBCUTANEOUS at 17:41

## 2024-06-20 RX ADMIN — ARFORMOTEROL TARTRATE 15 MCG: 15 SOLUTION RESPIRATORY (INHALATION) at 07:37

## 2024-06-20 RX ADMIN — INSULIN LISPRO 1 UNITS: 100 INJECTION, SOLUTION INTRAVENOUS; SUBCUTANEOUS at 17:56

## 2024-06-20 RX ADMIN — LIOTHYRONINE SODIUM 10 MCG: 5 TABLET ORAL at 09:49

## 2024-06-20 RX ADMIN — ESCITALOPRAM 10 MG: 10 TABLET, FILM COATED ORAL at 09:50

## 2024-06-20 RX ADMIN — SODIUM CHLORIDE, PRESERVATIVE FREE 10 ML: 5 INJECTION INTRAVENOUS at 09:51

## 2024-06-20 RX ADMIN — MEMANTINE HYDROCHLORIDE 10 MG: 10 TABLET ORAL at 09:50

## 2024-06-20 RX ADMIN — BUDESONIDE 500 MCG: 0.5 INHALANT RESPIRATORY (INHALATION) at 07:37

## 2024-06-20 RX ADMIN — FLUTICASONE PROPIONATE 2 SPRAY: 50 SPRAY, METERED NASAL at 09:46

## 2024-06-20 RX ADMIN — FUROSEMIDE 40 MG: 40 TABLET ORAL at 09:49

## 2024-06-20 RX ADMIN — LEVOTHYROXINE SODIUM ANHYDROUS 300 MCG: 200 INJECTION, POWDER, LYOPHILIZED, FOR SOLUTION INTRAVENOUS at 12:06

## 2024-06-20 RX ADMIN — AMLODIPINE BESYLATE 5 MG: 5 TABLET ORAL at 09:50

## 2024-06-20 ASSESSMENT — PAIN SCALES - PAIN ASSESSMENT IN ADVANCED DEMENTIA (PAINAD)
TOTALSCORE: 0
FACIALEXPRESSION: SMILING OR INEXPRESSIVE
BREATHING: NORMAL
FACIALEXPRESSION: SMILING OR INEXPRESSIVE
CONSOLABILITY: NO NEED TO CONSOLE
CONSOLABILITY: NO NEED TO CONSOLE
TOTALSCORE: 0
BREATHING: NORMAL
BODYLANGUAGE: RELAXED
BODYLANGUAGE: RELAXED

## 2024-06-20 ASSESSMENT — PAIN SCALES - GENERAL
PAINLEVEL_OUTOF10: 0

## 2024-06-20 NOTE — CARE COORDINATION
Pt to transition to [facility]. Daughter, Oralia Dodge ([phone]) made aware of transition plan and is in agreement of plan.     Nurse to call report to [phone].    Ebony Gonsalez, BSN, RN-CM  [facility]

## 2024-06-20 NOTE — DISCHARGE INSTRUCTIONS
DISCHARGE SUMMARY from Nurse    PATIENT INSTRUCTIONS:    After general anesthesia or intravenous sedation, for 24 hours or while taking prescription Narcotics:  Limit your activities  Do not drive and operate hazardous machinery  Do not make important personal or business decisions  Do  not drink alcoholic beverages  If you have not urinated within 8 hours after discharge, please contact your surgeon on call.    Report the following to your surgeon:  Excessive pain, swelling, redness or odor of or around the surgical area  Temperature over 100.5  Nausea and vomiting lasting longer than 4 hours or if unable to take medications  Any signs of decreased circulation or nerve impairment to extremity: change in color, persistent  numbness, tingling, coldness or increase pain  Any questions    What to do at Home:  Recommended activity: activity as tolerated and bedrest,     If you experience any of the following symptoms, shortness of breath, fatigue, swelling in ankles and feet, wheezing, please follow up with your PCP..    *  Please give a list of your current medications to your Primary Care Provider.    *  Please update this list whenever your medications are discontinued, doses are      changed, or new medications (including over-the-counter products) are added.    *  Please carry medication information at all times in case of emergency situations.    These are general instructions for a healthy lifestyle:    No smoking/ No tobacco products/ Avoid exposure to second hand smoke  Surgeon General's Warning:  Quitting smoking now greatly reduces serious risk to your health.    Obesity, smoking, and sedentary lifestyle greatly increases your risk for illness    A healthy diet, regular physical exercise & weight monitoring are important for maintaining a healthy lifestyle    You may be retaining fluid if you have a history of heart failure or if you experience any of the following symptoms:  Weight gain of 3 pounds or more  overnight or 5 pounds in a week, increased swelling in our hands or feet or shortness of breath while lying flat in bed.  Please call your doctor as soon as you notice any of these symptoms; do not wait until your next office visit.        The discharge information has been reviewed with the patient.  The patient verbalized understanding.  Discharge medications reviewed with the patient,  and appropriate educational materials and side effects teaching were provided.  ___________________________________________________________________________________________________________________________________

## 2024-06-20 NOTE — DISCHARGE SUMMARY
Discharge Summary    Patient: No Alberts               Sex: female          DOA: 6/13/2024         YOB: 1935      Age:  89 y.o.        LOS:  LOS: 6 days                Admit Date: 6/13/2024    Discharge Date: 6/20/2024    Admission Diagnoses: Acute decompensated heart failure (HCC) [I50.9]  Acute hypoxemic respiratory failure (HCC) [J96.01]  Acute on chronic congestive heart failure, unspecified heart failure type (HCC) [I50.9]  Dementia, unspecified dementia severity, unspecified dementia type, unspecified whether behavioral, psychotic, or mood disturbance or anxiety (HCC) [F03.90]    Reason for Admission:    89 years old white female who was brought from the nursing home because of shortness of breath and hypoxia reported in the 80s. The patient is a nursing home patient with medical history significant for dementia, CHF per records, COPD, diabetes, hypertension, psychiatric history, sleep apnea, thyroid disease and tremors. She was not able to provide history due to her advanced dementia, and altered mental status. She received nebs by EMS for COPD, and IV Lasix in ED for acute CHF. She was placed on oxygen by nasal cannula and admitted for further management.     Discharge Condition:  fair    Hospital Course:  1.  Volume depletion, improving status post IV fluids.  2.  Acute metabolic encephalopathy, superimposed on advanced dementia, resolving.  Ammonia, B12, folate within normal limits.  CT head nil acute.  Treat hypothyroidism.  Urinalysis negative. urine culture negative.  3.  COPD, no acute exacerbation.  Resume previous inhalers.  4. Acute hypoxic respiratory failure secondary to the above.  Titrated supplemental oxygen down and to off.  5. Advanced dementia, has returned to baseline mental status per daughter.  Resume previous medications.  6. DM2, with steroid-induced hyperglycemia.  In-house, patient received Lantus.  Resume oral hypoglycemics at discharge.  ADA diet.  7.  Mild to  apparent.  No significant mass  effect such as midline shift or sulcal effacement.  - Gray-white matter differentiation:  There are mild to moderate periventricular  decreased white matter attenuation changes, suggestive of chronic small vessel  ischemia.  No convincing evidence of acute infarct is noted.  No significant  overall interval changes.    CSF spaces:  No acute abnormalities.    Calvarium:  Intact.    Sinuses:  Clear.    Interval assessment:  No significant interval changes are observed.    Impression  1.  No acute intracranial process.    2.  Chronic small vessel ischemic changes.    3.  No significant interval change.        Electronically signed by Olvin Capone    Encounter Date: 06/13/24   EKG 12 Lead (SOB)   Result Value    Ventricular Rate 69    Atrial Rate 68    QRS Duration 100    Q-T Interval 380    QTc Calculation (Bazett) 407    R Axis -45    T Axis 59    Diagnosis      Poor data quality, interpretation may be adversely affected  Electrode noise  Repeat EKG  Sinus rhythm  Left axis deviation  Septal infarct (cited on or before 13-JUN-2024)  Abnormal ECG  When compared with ECG of 13-JUN-2024 22:47,  No significant change was found  Confirmed by Adolfo Celis MD ([id number]) on 6/14/2024 10:54:05 AM       06/13/24    ECHO (TTE) COMPLETE (PRN CONTRAST/BUBBLE/STRAIN/3D) 06/14/2024  4:03 PM (Final)    Interpretation Summary    Image quality is technically difficult. Technically difficult study due to patient's body habitus and limited study due to patient's ability to tolerate test.    Left Ventricle: Normal left ventricular systolic function with a visually estimated EF of 60 - 65%. Left ventricle size is normal. Mild septal thickening. Normal wall motion.    Left Atrium: Left atrial volume index is mildly increased (35-41 mL/m2). LA Vol Index A/L is 38 mL/m2.    Aortic Valve: Trileaflet valve. Moderately calcified cusp. Trace regurgitation. Moderate to severe stenosis of the aortic valve. Noted by the

## 2024-06-20 NOTE — PROGRESS NOTES
Advance Care Planning  People with COVID-19 may have no symptoms, mild symptoms, such as fever, cough, and shortness of breath or they may have more severe illness, developing severe and fatal pneumonia.  As a result, Advance Care Planning with attention to naming a health care decision maker (someone you trust to make healthcare decisions for you if you could not speak for yourself) and sharing other health care preferences is important BEFORE a possible health crisis. Please contact your Primary Care Provider to discuss Advance Care Planning.    Preventing the Spread of Coronavirus Disease 2019 in Homes and Residential Communities  For the most recent information go to https://www.cdc.gov/coronavirus/2019-ncov/hcp/guidance-prevent-spread.html    Prevention steps for People with confirmed or suspected COVID-19 (including persons under investigation) who do not need to be hospitalized  and   People with confirmed COVID-19 who were hospitalized and determined to be medically stable to go home    Your healthcare provider and public health staff will evaluate whether you can be cared for at home. If it is determined that you do not need to be hospitalized and can be isolated at home, you will be monitored by staff from your local or state health department. You should follow the prevention steps below until a healthcare provider or local or state health department says you can return to your normal activities.  Stay home except to get medical care  People who are mildly ill with COVID-19 are able to isolate at home during their illness. You should restrict activities outside your home, except for getting medical care. Do not go to work, school, or public areas. Avoid using public transportation, ride-sharing, or taxis.  Separate yourself from other people and animals in your home  People: As much as possible, you should stay in a specific room and away from other people in your home. Also, you should use a separate  bathroom, if available.  Animals: You should restrict contact with pets and other animals while you are sick with COVID-19, just like you would around other people. Although there have not been reports of pets or other animals becoming sick with COVID-19, it is still recommended that people sick with COVID-19 limit contact with animals until more information is known about the virus. When possible, have another member of your household care for your animals while you are sick. If you are sick with COVID-19, avoid contact with your pet, including petting, snuggling, being kissed or licked, and sharing food. If you must care for your pet or be around animals while you are sick, wash your hands before and after you interact with pets and wear a facemask.  Call ahead before visiting your doctor  If you have a medical appointment, call the healthcare provider and tell them that you have or may have COVID-19. This will help the healthcare provider’s office take steps to keep other people from getting infected or exposed.  Wear a facemask  You should wear a facemask when you are around other people (e.g., sharing a room or vehicle) or pets and before you enter a healthcare provider’s office. If you are not able to wear a facemask (for example, because it causes trouble breathing), then people who live with you should not stay in the same room with you, or they should wear a facemask if they enter your room.  Cover your coughs and sneezes  Cover your mouth and nose with a tissue when you cough or sneeze. Throw used tissues in a lined trash can. Immediately wash your hands with soap and water for at least 20 seconds or, if soap and water are not available, clean your hands with an alcohol-based hand  that contains at least 60% alcohol.  Clean your hands often  Wash your hands often with soap and water for at least 20 seconds, especially after blowing your nose, coughing, or sneezing; going to the bathroom; and

## 2024-06-20 NOTE — PROGRESS NOTES
Informational endocrinology consultation note    Chart reviewed patient now with free T4 above reference range and normalized free T3.  TSH still elevated, but markedly improved at 11.60.     Latest Reference Range & Units 06/14/24 09:55 06/15/24 01:54 06/16/24 02:40 06/17/24 05:03 06/18/24 04:37 06/19/24 00:53 06/20/24 02:59   T3,FREE,FT3 2.18 - 3.98 PG/ML  0.8 (L)     2.3   T4 Free 0.7 - 1.5 NG/DL 0.3 (L) 0.8 0.8 0.7 1.1 1.3 1.6 (H)   TSH, 3rd Generation 0.36 - 3.74 uIU/mL 89.70 (H) 56.90 (H) 45.40 (H) 88.90 (H) 44.40 (H) 30.60 (H) 11.60 (H)       Impression/recommendations:  this is an 89-year-old woman with a known history of hypothyroidism, previously on levothyroxine 3, who was admitted with severe hypothyroidism.  She was initially treated with IV levothyroxine and transitioned over to p.o. levothyroxine with p.o. liothyronine.  Currently, free T4 has improved and now is slightly elevated.  Patient's  full weight-based replacement dose is approximately 100 mcg.  Given patient's age and other comorbidities, would benefit from reduction in levothyroxine dose and discontinuation of liothyronine.    - Would decrease levothyroxine to 100 mcg p.o. daily.  It would be important that this be given first thing in the morning, with water only, and wait at least 30 to 60 minutes before any other medications or p.o. intake is given.  If patient is on any calcium/vitamin D supplements, then patient should have those given at least 4 hours after levothyroxine administration.    - Would discontinue liothyronine (Cytomel) on discharge    - Would continue to check free T4 on a weekly basis to monitor for any further dose adjustments, until free T4 has stabilized and is in the reference range..  Would have TSH and free T4 checked in approximately 6 to 8 weeks to see if patient has stabilized on levothyroxine 100 mcg p.o. daily.    - Patient will need to follow-up for continued monitoring of levothyroxine.  She can be seen in

## 2024-06-20 NOTE — CARE COORDINATION
Requested Case Management specialist to assist with transportation to [facility].  Address is [address], [place] [address] and phone number is [phone]  Patient will require BLS transport.   Pt requires Stretcher If stretcher, reason: Deconditioning R/T dx process, CHF, fall risk  Patient is currently requiring oxygen Yes 2 LNC  Height:5'4\"   Weight: 150 lbs  Pt is on isolation: No Isolation is for: n/a  Is the pt ready now? yes  Requested time: Next Available  PCS Faxed:  No  Insurance verified on face sheet: Yes  Auth needed for transport: Yes and No  CM completed PCS/ Envelope and placed on chart.     Ebony Gonsalez, BSN, RN-CM  [facility]

## 2024-06-20 NOTE — CARE COORDINATION
Call from Nursing Supervisor Ximena, inquiring if transportation was moved for today to [facility].    This CM sees no evidence of it being moved.    Call made to [employer] [facility] [phone], spoke to Pop, patient HIPAA verified, inquiring if any one called for transportation to be moved for today, he states, \" No, we have one for tomorrow at 1500.\"    This CM requests it be moved for today asap. He states his crew will be here within the hour. He confirms addresses, code status, patient hgt and wgt, any isolation and that patient will require oxygen  and a stretcher for transport.     Call back to Ximena at [phone], Informed of the above, she states the bedside nurse already called report to [facility] and they are willing to accept patient this evening.

## 2024-06-20 NOTE — PLAN OF CARE
Problem: Safety - Adult  Goal: Free from fall injury  Outcome: Progressing     Problem: Pain  Goal: Verbalizes/displays adequate comfort level or baseline comfort level  Outcome: Progressing     Problem: Skin/Tissue Integrity  Goal: Absence of new skin breakdown  Description: 1.  Monitor for areas of redness and/or skin breakdown  2.  Assess vascular access sites hourly  3.  Every 4-6 hours minimum:  Change oxygen saturation probe site  4.  Every 4-6 hours:  If on nasal continuous positive airway pressure, respiratory therapy assess nares and determine need for appliance change or resting period.  Outcome: Progressing     Problem: Neurosensory - Adult  Goal: Achieves stable or improved neurological status  Outcome: Progressing

## 2024-06-20 NOTE — PLAN OF CARE
Problem: Safety - Adult  Goal: Free from fall injury  6/20/2024 1448 by Anabel Nayak RN  Outcome: Adequate for Discharge  6/20/2024 1426 by Anabel Nayak RN  Outcome: Progressing     Problem: Discharge Planning  Goal: Discharge to home or other facility with appropriate resources  Outcome: Adequate for Discharge  Flowsheets (Taken 6/20/2024 0800)  Discharge to home or other facility with appropriate resources: Identify barriers to discharge with patient and caregiver     Problem: Pain  Goal: Verbalizes/displays adequate comfort level or baseline comfort level  6/20/2024 1448 by Anabel Nayak RN  Outcome: Adequate for Discharge  6/20/2024 1426 by Anabel Nayak RN  Outcome: Progressing     Problem: Skin/Tissue Integrity  Goal: Absence of new skin breakdown  Description: 1.  Monitor for areas of redness and/or skin breakdown  2.  Assess vascular access sites hourly  3.  Every 4-6 hours minimum:  Change oxygen saturation probe site  4.  Every 4-6 hours:  If on nasal continuous positive airway pressure, respiratory therapy assess nares and determine need for appliance change or resting period.  6/20/2024 1448 by Anabel Nayak RN  Outcome: Adequate for Discharge  6/20/2024 1426 by Anabel Nayak RN  Outcome: Progressing     Problem: Neurosensory - Adult  Goal: Achieves stable or improved neurological status  6/20/2024 1448 by Anabel Nayak RN  Outcome: Adequate for Discharge  6/20/2024 1426 by Anabel Nayak RN  Outcome: Progressing  Goal: Achieves maximal functionality and self care  Outcome: Adequate for Discharge     Problem: Respiratory - Adult  Goal: Achieves optimal ventilation and oxygenation  Outcome: Adequate for Discharge     Problem: Skin/Tissue Integrity - Adult  Goal: Skin integrity remains intact  Outcome: Adequate for Discharge     Problem: Genitourinary - Adult  Goal: Absence of urinary retention  Outcome: Adequate for Discharge     Problem: Chronic Conditions and Co-morbidities  Goal: Patient's chronic

## 2024-06-22 LAB — IGF-I SERPL-MCNC: 62 NG/ML (ref 34–169)

## 2024-07-15 PROBLEM — R79.89 ELEVATED TROPONIN: Status: RESOLVED | Noted: 2024-06-15 | Resolved: 2024-07-15

## 2025-06-23 ENCOUNTER — APPOINTMENT (OUTPATIENT)
Facility: HOSPITAL | Age: 89
DRG: 193 | End: 2025-06-23
Payer: MEDICARE

## 2025-06-23 ENCOUNTER — HOSPITAL ENCOUNTER (INPATIENT)
Facility: HOSPITAL | Age: 89
LOS: 7 days | Discharge: HOSPICE | DRG: 193 | End: 2025-06-30
Attending: STUDENT IN AN ORGANIZED HEALTH CARE EDUCATION/TRAINING PROGRAM | Admitting: STUDENT IN AN ORGANIZED HEALTH CARE EDUCATION/TRAINING PROGRAM
Payer: MEDICARE

## 2025-06-23 DIAGNOSIS — Z51.5 ENCOUNTER FOR PALLIATIVE CARE: ICD-10-CM

## 2025-06-23 DIAGNOSIS — R06.02 SHORTNESS OF BREATH: ICD-10-CM

## 2025-06-23 DIAGNOSIS — J96.02 ACUTE RESPIRATORY FAILURE WITH HYPOXIA AND HYPERCARBIA (HCC): ICD-10-CM

## 2025-06-23 DIAGNOSIS — J12.3 PNEUMONIA DUE TO HUMAN METAPNEUMOVIRUS: Primary | ICD-10-CM

## 2025-06-23 DIAGNOSIS — J96.01 ACUTE RESPIRATORY FAILURE WITH HYPOXIA AND HYPERCARBIA (HCC): ICD-10-CM

## 2025-06-23 LAB
ALBUMIN SERPL-MCNC: 2.7 G/DL (ref 3.4–5)
ALBUMIN/GLOB SERPL: 0.8 (ref 0.8–1.7)
ALP SERPL-CCNC: 61 U/L (ref 45–117)
ALT SERPL-CCNC: 7 U/L (ref 10–35)
ANION GAP BLD CALC-SCNC: ABNORMAL MMOL/L (ref 10–20)
ANION GAP SERPL CALC-SCNC: 9 MMOL/L (ref 3–18)
APPEARANCE UR: ABNORMAL
ARTERIAL PATENCY WRIST A: POSITIVE
AST SERPL-CCNC: 17 U/L (ref 10–38)
B PERT DNA SPEC QL NAA+PROBE: NOT DETECTED
BACTERIA URNS QL MICRO: ABNORMAL /HPF
BASE EXCESS BLD CALC-SCNC: 2.4 MMOL/L
BASOPHILS # BLD: 0.03 K/UL (ref 0–0.1)
BASOPHILS NFR BLD: 0.3 % (ref 0–2)
BDY SITE: ABNORMAL
BILIRUB SERPL-MCNC: 0.3 MG/DL (ref 0.2–1)
BILIRUB UR QL: NEGATIVE
BORDETELLA PARAPERTUSSIS BY PCR: NOT DETECTED
BUN SERPL-MCNC: 32 MG/DL (ref 6–23)
BUN/CREAT SERPL: 41 (ref 12–20)
C PNEUM DNA SPEC QL NAA+PROBE: NOT DETECTED
CA-I BLD-MCNC: 1.27 MMOL/L (ref 1.15–1.33)
CALCIUM SERPL-MCNC: 8.8 MG/DL (ref 8.5–10.1)
CHLORIDE BLD-SCNC: 108 MMOL/L (ref 98–107)
CHLORIDE SERPL-SCNC: 108 MMOL/L (ref 98–107)
CO2 BLD-SCNC: 31 MMOL/L (ref 22–29)
CO2 SERPL-SCNC: 29 MMOL/L (ref 21–32)
COLOR UR: YELLOW
CREAT BLD-MCNC: 0.82 MG/DL (ref 0.6–1.3)
CREAT SERPL-MCNC: 0.78 MG/DL (ref 0.6–1.3)
DIFFERENTIAL METHOD BLD: ABNORMAL
EKG ATRIAL RATE: 90 BPM
EKG DIAGNOSIS: NORMAL
EKG P AXIS: 56 DEGREES
EKG P-R INTERVAL: 186 MS
EKG Q-T INTERVAL: 374 MS
EKG QRS DURATION: 108 MS
EKG QTC CALCULATION (BAZETT): 457 MS
EKG R AXIS: -65 DEGREES
EKG T AXIS: 54 DEGREES
EKG VENTRICULAR RATE: 90 BPM
EOSINOPHIL # BLD: 0.01 K/UL (ref 0–0.4)
EOSINOPHIL NFR BLD: 0.1 % (ref 0–5)
EPITH CASTS URNS QL MICRO: ABNORMAL /LPF (ref 0–5)
ERYTHROCYTE [DISTWIDTH] IN BLOOD BY AUTOMATED COUNT: 15 % (ref 11.6–14.5)
FIO2 ON VENT: 6 %
FLUAV SUBTYP SPEC NAA+PROBE: NOT DETECTED
FLUBV RNA SPEC QL NAA+PROBE: NOT DETECTED
GAS FLOW.O2 O2 DELIVERY SYS: ABNORMAL
GLOBULIN SER CALC-MCNC: 3.5 G/DL (ref 2–4)
GLUCOSE BLD STRIP.AUTO-MCNC: 118 MG/DL (ref 70–110)
GLUCOSE BLD STRIP.AUTO-MCNC: 143 MG/DL (ref 70–110)
GLUCOSE BLD-MCNC: 150 MG/DL (ref 74–99)
GLUCOSE SERPL-MCNC: 148 MG/DL (ref 74–108)
GLUCOSE UR STRIP.AUTO-MCNC: NEGATIVE MG/DL
HADV DNA SPEC QL NAA+PROBE: NOT DETECTED
HCO3 BLD-SCNC: 31.5 MMOL/L (ref 21–28)
HCOV 229E RNA SPEC QL NAA+PROBE: NOT DETECTED
HCOV HKU1 RNA SPEC QL NAA+PROBE: NOT DETECTED
HCOV NL63 RNA SPEC QL NAA+PROBE: NOT DETECTED
HCOV OC43 RNA SPEC QL NAA+PROBE: NOT DETECTED
HCT VFR BLD AUTO: 40.5 % (ref 35–45)
HGB BLD-MCNC: 12 G/DL (ref 12–16)
HGB UR QL STRIP: ABNORMAL
HMPV RNA SPEC QL NAA+PROBE: DETECTED
HPIV1 RNA SPEC QL NAA+PROBE: NOT DETECTED
HPIV2 RNA SPEC QL NAA+PROBE: NOT DETECTED
HPIV3 RNA SPEC QL NAA+PROBE: NOT DETECTED
HPIV4 RNA SPEC QL NAA+PROBE: NOT DETECTED
IMM GRANULOCYTES # BLD AUTO: 0.08 K/UL (ref 0–0.04)
IMM GRANULOCYTES NFR BLD AUTO: 0.7 % (ref 0–0.5)
KETONES UR QL STRIP.AUTO: NEGATIVE MG/DL
L PNEUMO AG UR QL: NEGATIVE
LACTATE BLD-SCNC: 0.78 MMOL/L (ref 0.4–2)
LACTATE BLD-SCNC: 1.38 MMOL/L (ref 0.4–2)
LEUKOCYTE ESTERASE UR QL STRIP.AUTO: ABNORMAL
LYMPHOCYTES # BLD: 1.41 K/UL (ref 0.9–3.6)
LYMPHOCYTES NFR BLD: 13.1 % (ref 21–52)
M PNEUMO DNA SPEC QL NAA+PROBE: NOT DETECTED
MCH RBC QN AUTO: 27.2 PG (ref 24–34)
MCHC RBC AUTO-ENTMCNC: 29.6 G/DL (ref 31–37)
MCV RBC AUTO: 91.8 FL (ref 78–100)
MONOCYTES # BLD: 0.31 K/UL (ref 0.05–1.2)
MONOCYTES NFR BLD: 2.9 % (ref 3–10)
NEGATIVE CONTROL: NEGATIVE
NEUTS SEG # BLD: 8.9 K/UL (ref 1.8–8)
NEUTS SEG NFR BLD: 82.9 % (ref 40–73)
NITRITE UR QL STRIP.AUTO: POSITIVE
NRBC # BLD: 0 K/UL (ref 0–0.01)
NRBC BLD-RTO: 0 PER 100 WBC
NT PRO BNP: 2171 PG/ML (ref 36–1800)
PCO2 BLD: 70.8 MMHG (ref 35–48)
PH BLD: 7.26 (ref 7.35–7.45)
PH UR STRIP: 5 (ref 5–8)
PH, URINE: 4.5 (ref 4.6–8)
PLATELET # BLD AUTO: 183 K/UL (ref 135–420)
PMV BLD AUTO: 9.5 FL (ref 9.2–11.8)
PO2 BLD: 128 MMHG (ref 83–108)
POSITIVE CONTROL: POSITIVE
POTASSIUM BLD-SCNC: 4 MMOL/L (ref 3.5–5.1)
POTASSIUM SERPL-SCNC: 4.3 MMOL/L (ref 3.5–5.5)
PROCALCITONIN SERPL-MCNC: 0.21 NG/ML
PROT SERPL-MCNC: 6.2 G/DL (ref 6.4–8.2)
PROT UR STRIP-MCNC: 100 MG/DL
RBC # BLD AUTO: 4.41 M/UL (ref 4.2–5.3)
RBC #/AREA URNS HPF: ABNORMAL /HPF (ref 0–5)
RSV RNA SPEC QL NAA+PROBE: NOT DETECTED
RV+EV RNA SPEC QL NAA+PROBE: NOT DETECTED
S PNEUM AG UR QL IA.RAPID: NEGATIVE
SAO2 % BLD: 98 % (ref 94–98)
SARS-COV-2 RNA RESP QL NAA+PROBE: NOT DETECTED
SERVICE CMNT-IMP: ABNORMAL
SODIUM BLD-SCNC: 148 MMOL/L (ref 136–145)
SODIUM SERPL-SCNC: 145 MMOL/L (ref 136–145)
SP GR UR REFRACTOMETRY: 1.02 (ref 1–1.03)
SPECIMEN SITE: ABNORMAL
TROPONIN T SERPL HS-MCNC: 74.5 NG/L (ref 0–14)
URATE CRY URNS QL MICRO: ABNORMAL
UROBILINOGEN UR QL STRIP.AUTO: 0.2 EU/DL (ref 0.2–1)
WBC # BLD AUTO: 10.7 K/UL (ref 4.6–13.2)
WBC URNS QL MICRO: ABNORMAL /HPF (ref 0–4)

## 2025-06-23 PROCEDURE — 99285 EMERGENCY DEPT VISIT HI MDM: CPT

## 2025-06-23 PROCEDURE — 2700000000 HC OXYGEN THERAPY PER DAY

## 2025-06-23 PROCEDURE — 36600 WITHDRAWAL OF ARTERIAL BLOOD: CPT

## 2025-06-23 PROCEDURE — 6360000004 HC RX CONTRAST MEDICATION

## 2025-06-23 PROCEDURE — 93005 ELECTROCARDIOGRAM TRACING: CPT

## 2025-06-23 PROCEDURE — 6360000002 HC RX W HCPCS

## 2025-06-23 PROCEDURE — 84132 ASSAY OF SERUM POTASSIUM: CPT

## 2025-06-23 PROCEDURE — 87186 SC STD MICRODIL/AGAR DIL: CPT

## 2025-06-23 PROCEDURE — 87086 URINE CULTURE/COLONY COUNT: CPT

## 2025-06-23 PROCEDURE — 84484 ASSAY OF TROPONIN QUANT: CPT

## 2025-06-23 PROCEDURE — 0202U NFCT DS 22 TRGT SARS-COV-2: CPT

## 2025-06-23 PROCEDURE — 99222 1ST HOSP IP/OBS MODERATE 55: CPT | Performed by: STUDENT IN AN ORGANIZED HEALTH CARE EDUCATION/TRAINING PROGRAM

## 2025-06-23 PROCEDURE — 83605 ASSAY OF LACTIC ACID: CPT

## 2025-06-23 PROCEDURE — 85025 COMPLETE CBC W/AUTO DIFF WBC: CPT

## 2025-06-23 PROCEDURE — 82330 ASSAY OF CALCIUM: CPT

## 2025-06-23 PROCEDURE — 84145 PROCALCITONIN (PCT): CPT

## 2025-06-23 PROCEDURE — 81001 URINALYSIS AUTO W/SCOPE: CPT

## 2025-06-23 PROCEDURE — 2000000000 HC ICU R&B

## 2025-06-23 PROCEDURE — 71260 CT THORAX DX C+: CPT

## 2025-06-23 PROCEDURE — 87899 AGENT NOS ASSAY W/OPTIC: CPT

## 2025-06-23 PROCEDURE — 5A0945A ASSISTANCE WITH RESPIRATORY VENTILATION, 24-96 CONSECUTIVE HOURS, HIGH NASAL FLOW/VELOCITY: ICD-10-PCS | Performed by: STUDENT IN AN ORGANIZED HEALTH CARE EDUCATION/TRAINING PROGRAM

## 2025-06-23 PROCEDURE — 96365 THER/PROPH/DIAG IV INF INIT: CPT

## 2025-06-23 PROCEDURE — 93010 ELECTROCARDIOGRAM REPORT: CPT | Performed by: INTERNAL MEDICINE

## 2025-06-23 PROCEDURE — 2580000003 HC RX 258

## 2025-06-23 PROCEDURE — 84295 ASSAY OF SERUM SODIUM: CPT

## 2025-06-23 PROCEDURE — 87040 BLOOD CULTURE FOR BACTERIA: CPT

## 2025-06-23 PROCEDURE — 87088 URINE BACTERIA CULTURE: CPT

## 2025-06-23 PROCEDURE — 83880 ASSAY OF NATRIURETIC PEPTIDE: CPT

## 2025-06-23 PROCEDURE — 82803 BLOOD GASES ANY COMBINATION: CPT

## 2025-06-23 PROCEDURE — 94640 AIRWAY INHALATION TREATMENT: CPT

## 2025-06-23 PROCEDURE — 6360000002 HC RX W HCPCS: Performed by: STUDENT IN AN ORGANIZED HEALTH CARE EDUCATION/TRAINING PROGRAM

## 2025-06-23 PROCEDURE — 2500000003 HC RX 250 WO HCPCS: Performed by: STUDENT IN AN ORGANIZED HEALTH CARE EDUCATION/TRAINING PROGRAM

## 2025-06-23 PROCEDURE — 82962 GLUCOSE BLOOD TEST: CPT

## 2025-06-23 PROCEDURE — 80053 COMPREHEN METABOLIC PANEL: CPT

## 2025-06-23 PROCEDURE — 85014 HEMATOCRIT: CPT

## 2025-06-23 PROCEDURE — 2580000003 HC RX 258: Performed by: STUDENT IN AN ORGANIZED HEALTH CARE EDUCATION/TRAINING PROGRAM

## 2025-06-23 PROCEDURE — 82947 ASSAY GLUCOSE BLOOD QUANT: CPT

## 2025-06-23 PROCEDURE — 71045 X-RAY EXAM CHEST 1 VIEW: CPT

## 2025-06-23 RX ORDER — DEXTROSE MONOHYDRATE 100 MG/ML
INJECTION, SOLUTION INTRAVENOUS CONTINUOUS PRN
Status: DISCONTINUED | OUTPATIENT
Start: 2025-06-23 | End: 2025-06-30 | Stop reason: HOSPADM

## 2025-06-23 RX ORDER — ONDANSETRON 2 MG/ML
4 INJECTION INTRAMUSCULAR; INTRAVENOUS EVERY 6 HOURS PRN
Status: DISCONTINUED | OUTPATIENT
Start: 2025-06-23 | End: 2025-06-30 | Stop reason: HOSPADM

## 2025-06-23 RX ORDER — POTASSIUM CHLORIDE 1500 MG/1
40 TABLET, EXTENDED RELEASE ORAL PRN
Status: DISCONTINUED | OUTPATIENT
Start: 2025-06-23 | End: 2025-06-30 | Stop reason: HOSPADM

## 2025-06-23 RX ORDER — BUDESONIDE AND FORMOTEROL FUMARATE DIHYDRATE 160; 4.5 UG/1; UG/1
2 AEROSOL RESPIRATORY (INHALATION)
COMMUNITY
Start: 2024-09-05

## 2025-06-23 RX ORDER — AMPICILLIN TRIHYDRATE 250 MG
1000 CAPSULE ORAL DAILY
Status: DISCONTINUED | OUTPATIENT
Start: 2025-06-23 | End: 2025-06-23

## 2025-06-23 RX ORDER — HYDROXYZINE HYDROCHLORIDE 25 MG/1
25 TABLET, FILM COATED ORAL DAILY
Status: DISCONTINUED | OUTPATIENT
Start: 2025-06-23 | End: 2025-06-30 | Stop reason: HOSPADM

## 2025-06-23 RX ORDER — AMPICILLIN TRIHYDRATE 250 MG
1000 CAPSULE ORAL DAILY
Status: ON HOLD | COMMUNITY
Start: 2025-05-01 | End: 2025-06-30 | Stop reason: HOSPADM

## 2025-06-23 RX ORDER — MAGNESIUM SULFATE IN WATER 40 MG/ML
2000 INJECTION, SOLUTION INTRAVENOUS PRN
Status: DISCONTINUED | OUTPATIENT
Start: 2025-06-23 | End: 2025-06-30 | Stop reason: HOSPADM

## 2025-06-23 RX ORDER — VANCOMYCIN 1.5 G/300ML
1500 INJECTION, SOLUTION INTRAVENOUS ONCE
Status: DISCONTINUED | OUTPATIENT
Start: 2025-06-23 | End: 2025-06-23

## 2025-06-23 RX ORDER — SODIUM CHLORIDE 0.9 % (FLUSH) 0.9 %
5-40 SYRINGE (ML) INJECTION EVERY 12 HOURS SCHEDULED
Status: DISCONTINUED | OUTPATIENT
Start: 2025-06-23 | End: 2025-06-30 | Stop reason: HOSPADM

## 2025-06-23 RX ORDER — MEMANTINE HYDROCHLORIDE 10 MG/1
10 TABLET ORAL 2 TIMES DAILY
Status: DISCONTINUED | OUTPATIENT
Start: 2025-06-23 | End: 2025-06-30 | Stop reason: HOSPADM

## 2025-06-23 RX ORDER — INSULIN GLARGINE 100 [IU]/ML
10 INJECTION, SOLUTION SUBCUTANEOUS EVERY MORNING
Status: ON HOLD | COMMUNITY
Start: 2024-09-19 | End: 2025-06-30 | Stop reason: HOSPADM

## 2025-06-23 RX ORDER — FERROUS SULFATE 325(65) MG
325 TABLET ORAL EVERY OTHER DAY
Status: DISCONTINUED | OUTPATIENT
Start: 2025-06-24 | End: 2025-06-30 | Stop reason: HOSPADM

## 2025-06-23 RX ORDER — MULTIVITAMIN
1 TABLET ORAL DAILY
Status: ON HOLD | COMMUNITY
End: 2025-06-30 | Stop reason: HOSPADM

## 2025-06-23 RX ORDER — HYDROXYZINE HYDROCHLORIDE 25 MG/1
25 TABLET, FILM COATED ORAL DAILY
COMMUNITY
Start: 2025-02-21

## 2025-06-23 RX ORDER — PANTOPRAZOLE SODIUM 40 MG/1
40 TABLET, DELAYED RELEASE ORAL
Status: DISCONTINUED | OUTPATIENT
Start: 2025-06-24 | End: 2025-06-30 | Stop reason: HOSPADM

## 2025-06-23 RX ORDER — INSULIN LISPRO 100 [IU]/ML
0-4 INJECTION, SOLUTION INTRAVENOUS; SUBCUTANEOUS
Status: DISCONTINUED | OUTPATIENT
Start: 2025-06-23 | End: 2025-06-30 | Stop reason: HOSPADM

## 2025-06-23 RX ORDER — MINOCYCLINE HYDROCHLORIDE 50 MG/1
50 TABLET ORAL 2 TIMES DAILY
Status: ON HOLD | COMMUNITY
Start: 2024-12-31 | End: 2025-06-30 | Stop reason: HOSPADM

## 2025-06-23 RX ORDER — MAGNESIUM OXIDE 400 MG/1
400 TABLET ORAL DAILY
Status: ON HOLD | COMMUNITY
Start: 2025-05-01 | End: 2025-06-30 | Stop reason: HOSPADM

## 2025-06-23 RX ORDER — FERROUS SULFATE 325(65) MG
325 TABLET ORAL EVERY OTHER DAY
COMMUNITY
Start: 2025-05-01

## 2025-06-23 RX ORDER — 0.9 % SODIUM CHLORIDE 0.9 %
30 INTRAVENOUS SOLUTION INTRAVENOUS ONCE
Status: COMPLETED | OUTPATIENT
Start: 2025-06-23 | End: 2025-06-23

## 2025-06-23 RX ORDER — SODIUM CHLORIDE 9 MG/ML
INJECTION, SOLUTION INTRAVENOUS PRN
Status: DISCONTINUED | OUTPATIENT
Start: 2025-06-23 | End: 2025-06-30 | Stop reason: HOSPADM

## 2025-06-23 RX ORDER — IPRATROPIUM BROMIDE AND ALBUTEROL SULFATE 2.5; .5 MG/3ML; MG/3ML
3 SOLUTION RESPIRATORY (INHALATION) 4 TIMES DAILY PRN
COMMUNITY
Start: 2024-10-14

## 2025-06-23 RX ORDER — POLYETHYLENE GLYCOL 3350 17 G/17G
17 POWDER, FOR SOLUTION ORAL DAILY PRN
Status: DISCONTINUED | OUTPATIENT
Start: 2025-06-23 | End: 2025-06-30 | Stop reason: HOSPADM

## 2025-06-23 RX ORDER — LIOTHYRONINE SODIUM 5 UG/1
5 TABLET ORAL 2 TIMES DAILY
Status: DISCONTINUED | OUTPATIENT
Start: 2025-06-23 | End: 2025-06-30 | Stop reason: HOSPADM

## 2025-06-23 RX ORDER — ROSUVASTATIN CALCIUM 10 MG/1
10 TABLET, COATED ORAL NIGHTLY
Status: DISCONTINUED | OUTPATIENT
Start: 2025-06-23 | End: 2025-06-30 | Stop reason: HOSPADM

## 2025-06-23 RX ORDER — MULTIVITAMIN WITH IRON
1 TABLET ORAL DAILY
Status: DISCONTINUED | OUTPATIENT
Start: 2025-06-23 | End: 2025-06-30 | Stop reason: HOSPADM

## 2025-06-23 RX ORDER — ACETAMINOPHEN 325 MG/1
650 TABLET ORAL EVERY 6 HOURS PRN
Status: DISCONTINUED | OUTPATIENT
Start: 2025-06-23 | End: 2025-06-30 | Stop reason: HOSPADM

## 2025-06-23 RX ORDER — ACETAMINOPHEN 650 MG/1
650 SUPPOSITORY RECTAL EVERY 6 HOURS PRN
Status: DISCONTINUED | OUTPATIENT
Start: 2025-06-23 | End: 2025-06-30 | Stop reason: HOSPADM

## 2025-06-23 RX ORDER — CALCIUM CARBONATE/VITAMIN D3 600 MG-10
1 TABLET ORAL DAILY
Status: ON HOLD | COMMUNITY
Start: 2025-05-01 | End: 2025-06-30 | Stop reason: HOSPADM

## 2025-06-23 RX ORDER — SODIUM CHLORIDE 0.9 % (FLUSH) 0.9 %
5-40 SYRINGE (ML) INJECTION PRN
Status: DISCONTINUED | OUTPATIENT
Start: 2025-06-23 | End: 2025-06-30 | Stop reason: HOSPADM

## 2025-06-23 RX ORDER — LEVOTHYROXINE SODIUM 150 UG/1
150 TABLET ORAL
Status: DISCONTINUED | OUTPATIENT
Start: 2025-06-24 | End: 2025-06-30 | Stop reason: HOSPADM

## 2025-06-23 RX ORDER — ENOXAPARIN SODIUM 100 MG/ML
40 INJECTION SUBCUTANEOUS DAILY
Status: DISCONTINUED | OUTPATIENT
Start: 2025-06-23 | End: 2025-06-30 | Stop reason: HOSPADM

## 2025-06-23 RX ORDER — ONDANSETRON 4 MG/1
4 TABLET, ORALLY DISINTEGRATING ORAL EVERY 8 HOURS PRN
Status: DISCONTINUED | OUTPATIENT
Start: 2025-06-23 | End: 2025-06-30 | Stop reason: HOSPADM

## 2025-06-23 RX ORDER — INSULIN LISPRO 100 [IU]/ML
INJECTION, SOLUTION INTRAVENOUS; SUBCUTANEOUS
Status: ON HOLD | COMMUNITY
Start: 2024-10-31 | End: 2025-06-30 | Stop reason: HOSPADM

## 2025-06-23 RX ORDER — IOPAMIDOL 612 MG/ML
100 INJECTION, SOLUTION INTRAVASCULAR
Status: COMPLETED | OUTPATIENT
Start: 2025-06-23 | End: 2025-06-23

## 2025-06-23 RX ORDER — POTASSIUM CHLORIDE 7.45 MG/ML
10 INJECTION INTRAVENOUS PRN
Status: DISCONTINUED | OUTPATIENT
Start: 2025-06-23 | End: 2025-06-30 | Stop reason: HOSPADM

## 2025-06-23 RX ORDER — IPRATROPIUM BROMIDE AND ALBUTEROL SULFATE 2.5; .5 MG/3ML; MG/3ML
1 SOLUTION RESPIRATORY (INHALATION) EVERY 4 HOURS PRN
Status: DISCONTINUED | OUTPATIENT
Start: 2025-06-23 | End: 2025-06-30 | Stop reason: HOSPADM

## 2025-06-23 RX ORDER — VANCOMYCIN 1.75 G/350ML
1250 INJECTION, SOLUTION INTRAVENOUS EVERY 24 HOURS
Status: DISCONTINUED | OUTPATIENT
Start: 2025-06-24 | End: 2025-06-23

## 2025-06-23 RX ADMIN — PIPERACILLIN AND TAZOBACTAM 4500 MG: 4; .5 INJECTION, POWDER, FOR SOLUTION INTRAVENOUS at 13:18

## 2025-06-23 RX ADMIN — AZITHROMYCIN MONOHYDRATE 500 MG: 500 INJECTION, POWDER, LYOPHILIZED, FOR SOLUTION INTRAVENOUS at 16:17

## 2025-06-23 RX ADMIN — SODIUM CHLORIDE, PRESERVATIVE FREE 10 ML: 5 INJECTION INTRAVENOUS at 23:50

## 2025-06-23 RX ADMIN — SODIUM CHLORIDE 2040 ML: 0.9 INJECTION, SOLUTION INTRAVENOUS at 13:26

## 2025-06-23 RX ADMIN — PIPERACILLIN AND TAZOBACTAM 3375 MG: 3; .375 INJECTION, POWDER, LYOPHILIZED, FOR SOLUTION INTRAVENOUS at 19:05

## 2025-06-23 RX ADMIN — ENOXAPARIN SODIUM 40 MG: 100 INJECTION SUBCUTANEOUS at 16:09

## 2025-06-23 RX ADMIN — IOPAMIDOL 100 ML: 612 INJECTION, SOLUTION INTRAVENOUS at 23:09

## 2025-06-23 RX ADMIN — ARFORMOTEROL TARTRATE: 15 SOLUTION RESPIRATORY (INHALATION) at 19:54

## 2025-06-23 ASSESSMENT — PAIN SCALES - WONG BAKER: WONGBAKER_NUMERICALRESPONSE: HURTS LITTLE MORE

## 2025-06-23 ASSESSMENT — PAIN DESCRIPTION - LOCATION: LOCATION: OTHER (COMMENT)

## 2025-06-23 NOTE — ED TRIAGE NOTES
Pt presented to ED via [facility] transport from [facility] and rehab; pt O2 been reading and has been altered, fever x3 days; pt was 70s O2 and was put on non re-breather by facility;Responds to pain; rhonchi in left lung; DNR does not have an MD signature  18G left Forearm; HR -90s; normally verbal;   pt Has hx of alzheimers, Diabetes type2, HTN, COPD    Pt placed on monitor.

## 2025-06-23 NOTE — H&P
Influenza B PCR Not detected NOTD      Parainfluenza 1 PCR Not detected NOTD      Parainfluenza 2 PCR Not detected NOTD      Parainfluenza 3 PCR Not detected NOTD      Parainfluenza 4 PCR Not detected NOTD      Respiratory Syncytial Virus by PCR Not detected NOTD      Bordetella parapertussis by PCR Not detected NOTD      Bordetella pertussis by PCR Not detected NOTD      Chlamydophila Pneumonia PCR Not detected NOTD      Mycoplasma pneumo by PCR Not detected NOTD     POCT lactic acid (lactate)    Collection Time: 06/23/25  1:02 PM   Result Value Ref Range    POC Lactic Acid 1.38 0.40 - 2.00 mmol/L   POCT Blood Gas & Electrolytes    Collection Time: 06/23/25  1:12 PM   Result Value Ref Range    POC pH 7.26 (L) 7.35 - 7.45      POC pCO2 70.8 (H) 35.0 - 48.0 MMHG    POC PO2 128 (H) 83 - 108 MMHG    POC Ionized Calcium 1.27 1.15 - 1.33 mmol/L    Base Excess 2.4 mmol/L    POC HCO3 31.5 (H) 21 - 28 MMOL/L    POC TCO2 31 (H) 22 - 29 MMOL/L    POC O2 SAT 98 94 - 98 %    Source ARTERIAL      Site RIGHT RADIAL      Abhishek Test Positive      DEVICE NASAL CANNULA      FIO2 Arterial 6 %    Performed by: Aric Gonzalez     POC Sodium 148 (H) 136 - 145 mmol/L    POC Potassium 4.0 3.5 - 5.1 mmol/L    POC Glucose 150 (H) 74 - 99 mg/dL    POC Creatinine 0.82 0.6 - 1.3 mg/dL    POC Lactic Acid 0.78 0.40 - 2.00 mmol/L    POC Chloride 108 (H) 98 - 107 mmol/L    Anion Gap, POC Cannot be calculated  Cannot be calculated   10 - 20 mmol/L    eGFR, POC 68 >60 ml/min/1.73m2   EKG 12 Lead    Collection Time: 06/23/25  1:19 PM   Result Value Ref Range    Ventricular Rate 90 BPM    Atrial Rate 90 BPM    P-R Interval 186 ms    QRS Duration 108 ms    Q-T Interval 374 ms    QTc Calculation (Bazett) 457 ms    P Axis 56 degrees    R Axis -65 degrees    T Axis 54 degrees    Diagnosis       Normal sinus rhythm  Left axis deviation  Minimal voltage criteria for LVH, may be normal variant ( Jose product )  Anterior infarct (cited on or before

## 2025-06-23 NOTE — ED PROVIDER NOTES
EMERGENCY DEPARTMENT HISTORY AND PHYSICAL EXAM      Date: 6/23/2025  Patient Name: No Alberts    History of Presenting Illness     Chief Complaint   Patient presents with    Shortness of Breath    Fever       History (Context): No Alberts is a 89 y.o. female  presents to the ED today with chief complaint of altered mental status, fever, hypoxia.  Patient coming from a SNF, has a durable DNR that is not signed.  Patient coming from [facility] and rehab; rehab contacted for repeat documentation of durable DNR.  Patient has had multiple days of hypoxia reported from nursing staff; as low as 74% on room air.  Had fevers documented yesterday max temp 101.3 F.  Patient was placed on 3 L of oxygen on nonrebreather with minimal improvement.  EMS was called, EMS increased oxygen to 10 L by nasal cannula with improvement to 95%.  Patient nonverbal at baseline, history of Alzheimer's dementia, diabetes, hypertension, hyperlipidemia.      PCP: Kerry Booker MD    Current Facility-Administered Medications   Medication Dose Route Frequency Provider Last Rate Last Admin    <<Enter Patient Weight and Height into EPIC for Rx to Dose Vancomycin>>   Other RX Placeholder Catarino Arreola III, MD         Current Outpatient Medications   Medication Sig Dispense Refill    amLODIPine (NORVASC) 5 MG tablet Take 1 tablet by mouth daily 30 tablet 0    levothyroxine (SYNTHROID) 150 MCG tablet Take 1 tablet by mouth every morning (before breakfast) 30 tablet     furosemide (LASIX) 40 MG tablet Take 1 tablet by mouth daily      potassium chloride (KLOR-CON M) 20 MEQ extended release tablet Take 0.5 tablets by mouth daily 30 tablet 0    liothyronine (CYTOMEL) 5 MCG tablet Take 1 tablet by mouth in the morning and at bedtime 30 tablet 1    escitalopram (LEXAPRO) 10 MG tablet Take 10 mg by mouth daily      Fluticasone Furoate-Vilanterol (BREO ELLIPTA) 100-25 MCG/ACT AEPB ceived the following from [facility] -

## 2025-06-24 PROBLEM — J96.02 ACUTE RESPIRATORY FAILURE WITH HYPOXIA AND HYPERCARBIA (HCC): Status: ACTIVE | Noted: 2025-06-24

## 2025-06-24 PROBLEM — A41.9 SEPSIS (HCC): Status: ACTIVE | Noted: 2025-06-24

## 2025-06-24 PROBLEM — G30.9 MODERATE ALZHEIMER'S DEMENTIA (HCC): Status: ACTIVE | Noted: 2025-06-24

## 2025-06-24 PROBLEM — F02.B0 MODERATE ALZHEIMER'S DEMENTIA (HCC): Status: ACTIVE | Noted: 2025-06-24

## 2025-06-24 PROBLEM — R54 ADVANCED AGE: Status: ACTIVE | Noted: 2025-06-24

## 2025-06-24 PROBLEM — Z51.5 ENCOUNTER FOR PALLIATIVE CARE: Status: ACTIVE | Noted: 2025-06-24

## 2025-06-24 PROBLEM — J44.1 COPD EXACERBATION (HCC): Status: ACTIVE | Noted: 2025-06-24

## 2025-06-24 PROBLEM — J69.0 ASPIRATION PNEUMONIA (HCC): Status: ACTIVE | Noted: 2025-06-24

## 2025-06-24 PROBLEM — J96.01 ACUTE RESPIRATORY FAILURE WITH HYPOXIA AND HYPERCARBIA (HCC): Status: ACTIVE | Noted: 2025-06-24

## 2025-06-24 PROBLEM — Z71.89 GOALS OF CARE, COUNSELING/DISCUSSION: Status: ACTIVE | Noted: 2025-06-24

## 2025-06-24 LAB
ALBUMIN SERPL-MCNC: 2.4 G/DL (ref 3.4–5)
ALBUMIN/GLOB SERPL: 0.8 (ref 0.8–1.7)
ALP SERPL-CCNC: 51 U/L (ref 45–117)
ALT SERPL-CCNC: 5 U/L (ref 10–35)
ANION GAP SERPL CALC-SCNC: 11 MMOL/L (ref 3–18)
AST SERPL-CCNC: 17 U/L (ref 10–38)
BASOPHILS # BLD: 0.03 K/UL (ref 0–0.1)
BASOPHILS NFR BLD: 0.4 % (ref 0–2)
BILIRUB SERPL-MCNC: 0.3 MG/DL (ref 0.2–1)
BUN SERPL-MCNC: 29 MG/DL (ref 6–23)
BUN/CREAT SERPL: 38 (ref 12–20)
CALCIUM SERPL-MCNC: 8 MG/DL (ref 8.5–10.1)
CHLORIDE SERPL-SCNC: 112 MMOL/L (ref 98–107)
CO2 SERPL-SCNC: 24 MMOL/L (ref 21–32)
CREAT SERPL-MCNC: 0.77 MG/DL (ref 0.6–1.3)
CRP SERPL-MCNC: 9.3 MG/DL (ref 0–5)
DIFFERENTIAL METHOD BLD: ABNORMAL
EOSINOPHIL # BLD: 0.01 K/UL (ref 0–0.4)
EOSINOPHIL NFR BLD: 0.1 % (ref 0–5)
ERYTHROCYTE [DISTWIDTH] IN BLOOD BY AUTOMATED COUNT: 15.2 % (ref 11.6–14.5)
EST. AVERAGE GLUCOSE BLD GHB EST-MCNC: 145 MG/DL
GLOBULIN SER CALC-MCNC: 3.1 G/DL (ref 2–4)
GLUCOSE BLD STRIP.AUTO-MCNC: 134 MG/DL (ref 70–110)
GLUCOSE BLD STRIP.AUTO-MCNC: 85 MG/DL (ref 70–110)
GLUCOSE BLD STRIP.AUTO-MCNC: 92 MG/DL (ref 70–110)
GLUCOSE BLD STRIP.AUTO-MCNC: 97 MG/DL (ref 70–110)
GLUCOSE SERPL-MCNC: 107 MG/DL (ref 74–108)
HBA1C MFR BLD: 6.7 % (ref 4.2–5.6)
HCT VFR BLD AUTO: 38.4 % (ref 35–45)
HGB BLD-MCNC: 11.3 G/DL (ref 12–16)
IMM GRANULOCYTES # BLD AUTO: 0.06 K/UL (ref 0–0.04)
IMM GRANULOCYTES NFR BLD AUTO: 0.7 % (ref 0–0.5)
LYMPHOCYTES # BLD: 2.53 K/UL (ref 0.9–3.6)
LYMPHOCYTES NFR BLD: 29.8 % (ref 21–52)
MAGNESIUM SERPL-MCNC: 1.7 MG/DL (ref 1.6–2.6)
MCH RBC QN AUTO: 26.7 PG (ref 24–34)
MCHC RBC AUTO-ENTMCNC: 29.4 G/DL (ref 31–37)
MCV RBC AUTO: 90.8 FL (ref 78–100)
MONOCYTES # BLD: 0.28 K/UL (ref 0.05–1.2)
MONOCYTES NFR BLD: 3.3 % (ref 3–10)
NEUTS SEG # BLD: 5.59 K/UL (ref 1.8–8)
NEUTS SEG NFR BLD: 65.7 % (ref 40–73)
NRBC # BLD: 0 K/UL (ref 0–0.01)
NRBC BLD-RTO: 0 PER 100 WBC
NT PRO BNP: 2882 PG/ML (ref 36–1800)
PHOSPHATE SERPL-MCNC: 2.7 MG/DL (ref 2.5–4.9)
PLATELET # BLD AUTO: 144 K/UL (ref 135–420)
PLATELET COMMENT: ABNORMAL
PMV BLD AUTO: 10.2 FL (ref 9.2–11.8)
POTASSIUM SERPL-SCNC: 4.3 MMOL/L (ref 3.5–5.5)
PROT SERPL-MCNC: 5.5 G/DL (ref 6.4–8.2)
RBC # BLD AUTO: 4.23 M/UL (ref 4.2–5.3)
RBC MORPH BLD: ABNORMAL
SODIUM SERPL-SCNC: 148 MMOL/L (ref 136–145)
TSH, 3RD GENERATION: 45.4 UIU/ML (ref 0.27–4.2)
WBC # BLD AUTO: 8.5 K/UL (ref 4.6–13.2)

## 2025-06-24 PROCEDURE — 6360000002 HC RX W HCPCS: Performed by: STUDENT IN AN ORGANIZED HEALTH CARE EDUCATION/TRAINING PROGRAM

## 2025-06-24 PROCEDURE — 2500000003 HC RX 250 WO HCPCS

## 2025-06-24 PROCEDURE — 51798 US URINE CAPACITY MEASURE: CPT

## 2025-06-24 PROCEDURE — 92610 EVALUATE SWALLOWING FUNCTION: CPT

## 2025-06-24 PROCEDURE — 2580000003 HC RX 258: Performed by: STUDENT IN AN ORGANIZED HEALTH CARE EDUCATION/TRAINING PROGRAM

## 2025-06-24 PROCEDURE — 2700000000 HC OXYGEN THERAPY PER DAY

## 2025-06-24 PROCEDURE — 83735 ASSAY OF MAGNESIUM: CPT

## 2025-06-24 PROCEDURE — 99223 1ST HOSP IP/OBS HIGH 75: CPT | Performed by: NURSE PRACTITIONER

## 2025-06-24 PROCEDURE — 84443 ASSAY THYROID STIM HORMONE: CPT

## 2025-06-24 PROCEDURE — 6360000002 HC RX W HCPCS

## 2025-06-24 PROCEDURE — 83036 HEMOGLOBIN GLYCOSYLATED A1C: CPT

## 2025-06-24 PROCEDURE — 85025 COMPLETE CBC W/AUTO DIFF WBC: CPT

## 2025-06-24 PROCEDURE — 94640 AIRWAY INHALATION TREATMENT: CPT

## 2025-06-24 PROCEDURE — 99223 1ST HOSP IP/OBS HIGH 75: CPT | Performed by: INTERNAL MEDICINE

## 2025-06-24 PROCEDURE — 92526 ORAL FUNCTION THERAPY: CPT

## 2025-06-24 PROCEDURE — 83880 ASSAY OF NATRIURETIC PEPTIDE: CPT

## 2025-06-24 PROCEDURE — 94761 N-INVAS EAR/PLS OXIMETRY MLT: CPT

## 2025-06-24 PROCEDURE — 80053 COMPREHEN METABOLIC PANEL: CPT

## 2025-06-24 PROCEDURE — P9047 ALBUMIN (HUMAN), 25%, 50ML: HCPCS | Performed by: STUDENT IN AN ORGANIZED HEALTH CARE EDUCATION/TRAINING PROGRAM

## 2025-06-24 PROCEDURE — 36415 COLL VENOUS BLD VENIPUNCTURE: CPT

## 2025-06-24 PROCEDURE — 2500000003 HC RX 250 WO HCPCS: Performed by: STUDENT IN AN ORGANIZED HEALTH CARE EDUCATION/TRAINING PROGRAM

## 2025-06-24 PROCEDURE — APPSS15 APP SPLIT SHARED TIME 0-15 MINUTES

## 2025-06-24 PROCEDURE — 84100 ASSAY OF PHOSPHORUS: CPT

## 2025-06-24 PROCEDURE — 82962 GLUCOSE BLOOD TEST: CPT

## 2025-06-24 PROCEDURE — 86140 C-REACTIVE PROTEIN: CPT

## 2025-06-24 PROCEDURE — 2000000000 HC ICU R&B

## 2025-06-24 PROCEDURE — 2580000003 HC RX 258

## 2025-06-24 RX ORDER — ALBUTEROL SULFATE 1.25 MG/3ML
1.25 SOLUTION RESPIRATORY (INHALATION)
Status: DISCONTINUED | OUTPATIENT
Start: 2025-06-24 | End: 2025-06-28

## 2025-06-24 RX ORDER — VANCOMYCIN 1.5 G/300ML
1500 INJECTION, SOLUTION INTRAVENOUS ONCE
Status: COMPLETED | OUTPATIENT
Start: 2025-06-24 | End: 2025-06-24

## 2025-06-24 RX ORDER — SODIUM CHLORIDE FOR INHALATION 3 %
4 VIAL, NEBULIZER (ML) INHALATION
Status: DISCONTINUED | OUTPATIENT
Start: 2025-06-24 | End: 2025-06-28

## 2025-06-24 RX ORDER — FUROSEMIDE 10 MG/ML
40 INJECTION INTRAMUSCULAR; INTRAVENOUS ONCE
Status: COMPLETED | OUTPATIENT
Start: 2025-06-24 | End: 2025-06-24

## 2025-06-24 RX ORDER — SODIUM CHLORIDE 9 MG/ML
INJECTION, SOLUTION INTRAVENOUS CONTINUOUS
Status: DISCONTINUED | OUTPATIENT
Start: 2025-06-24 | End: 2025-06-24

## 2025-06-24 RX ORDER — ALBUMIN (HUMAN) 12.5 G/50ML
25 SOLUTION INTRAVENOUS ONCE
Status: COMPLETED | OUTPATIENT
Start: 2025-06-24 | End: 2025-06-24

## 2025-06-24 RX ADMIN — SODIUM CHLORIDE, PRESERVATIVE FREE 10 ML: 5 INJECTION INTRAVENOUS at 07:48

## 2025-06-24 RX ADMIN — VANCOMYCIN 1500 MG: 1.5 INJECTION, SOLUTION INTRAVENOUS at 09:27

## 2025-06-24 RX ADMIN — FUROSEMIDE 40 MG: 10 INJECTION, SOLUTION INTRAMUSCULAR; INTRAVENOUS at 02:55

## 2025-06-24 RX ADMIN — AZITHROMYCIN MONOHYDRATE 500 MG: 500 INJECTION, POWDER, LYOPHILIZED, FOR SOLUTION INTRAVENOUS at 16:23

## 2025-06-24 RX ADMIN — ARFORMOTEROL TARTRATE: 15 SOLUTION RESPIRATORY (INHALATION) at 07:47

## 2025-06-24 RX ADMIN — SODIUM CHLORIDE SOLN NEBU 3% 4 ML: 3 NEBU SOLN at 19:56

## 2025-06-24 RX ADMIN — ALBUMIN (HUMAN) 25 G: 0.25 INJECTION, SOLUTION INTRAVENOUS at 02:44

## 2025-06-24 RX ADMIN — PIPERACILLIN AND TAZOBACTAM 3375 MG: 3; .375 INJECTION, POWDER, LYOPHILIZED, FOR SOLUTION INTRAVENOUS at 16:24

## 2025-06-24 RX ADMIN — ENOXAPARIN SODIUM 40 MG: 100 INJECTION SUBCUTANEOUS at 07:47

## 2025-06-24 RX ADMIN — PIPERACILLIN AND TAZOBACTAM 3375 MG: 3; .375 INJECTION, POWDER, LYOPHILIZED, FOR SOLUTION INTRAVENOUS at 08:25

## 2025-06-24 RX ADMIN — ARFORMOTEROL TARTRATE: 15 SOLUTION RESPIRATORY (INHALATION) at 19:56

## 2025-06-24 RX ADMIN — METHYLPREDNISOLONE SODIUM SUCCINATE 40 MG: 40 INJECTION INTRAMUSCULAR; INTRAVENOUS at 16:26

## 2025-06-24 RX ADMIN — SODIUM CHLORIDE: 0.9 INJECTION, SOLUTION INTRAVENOUS at 00:55

## 2025-06-24 RX ADMIN — PIPERACILLIN AND TAZOBACTAM 3375 MG: 3; .375 INJECTION, POWDER, LYOPHILIZED, FOR SOLUTION INTRAVENOUS at 03:12

## 2025-06-24 RX ADMIN — ALBUTEROL SULFATE 1.25 MG: 1.25 SOLUTION RESPIRATORY (INHALATION) at 19:56

## 2025-06-24 ASSESSMENT — PAIN SCALES - WONG BAKER: WONGBAKER_NUMERICALRESPONSE: HURTS A LITTLE BIT

## 2025-06-24 NOTE — ACP (ADVANCE CARE PLANNING)
Advance Care Planning     Palliative Team Advance Care Planning (ACP) Conversation    Date of Conversation: 06/24/25     ACP documents on file prior to discussion:  -Power of  for Healthcare  -[facility]    Healthcare Decision Maker:    Primary Decision Maker: Oralia Tolentino - Child - [phone]    Secondary Decision Maker: Marline GibbonsAngy - Niece/Nephew - [phone]    Conversation Summary:    Visited patient for new consult along with Palliative team member PRASAD Marks NP. Patient is lying in bed on her left side, eyes closed. Alerted to name, able to say a few words only. Respirations are easy and non labored, currently on hi renetta oxygen at 30/40. She is not able to participate in her health care conversations or make complex medical decisions. Bedside RN and ST in room during our visit.     Patient does have a Durable Power of  document on file that names her daughter Oralia as her primary decision maker, and her niece Marline as the secondary decision maker.    Patient also has a signed POST form on file that was completed by Elias SHARPE on 5/24/23.    Call placed to patient's daughter Oralia at [phone].  Introduced the role of palliative in the acute hospital setting as extra support to assist families in determining goals of care that align with patient wishes. Daughter clarified that patient's niece Marline, listed as the secondary decision maker has passed away, removed form the contact list. Daughter is aware of patient's aspiration risk, states she has been on a pureed diet at the nursing facility. She did confirm the POST form that she signed.     6/24/25  12:10 pm  Met with patient's daughter in room. Hi renetta oxygen has been weaned down to 25/30. Patient is resting comfortably. Daughter shared that the patient does not ambulate, is wheelchair bound. Was alert and oriented X 5 just prior to coming to the hospital.   Re-addressed goals of care, daughter remains in agreement with the

## 2025-06-24 NOTE — ED NOTES
TRANSFER - OUT REPORT:    Verbal report given to BROOKLYNN Reeves on No Alberts  being transferred to [facility] for routine progression of patient care       Report consisted of patient's Situation, Background, Assessment and   Recommendations(SBAR).     Information from the following report(s) Nurse Handoff Report, ED Encounter Summary, ED SBAR, MAR, and Neuro Assessment was reviewed with the receiving nurse.    Galt Fall Assessment:    Presents to emergency department  because of falls (Syncope, seizure, or loss of consciousness): No  Age > 70: Yes  Altered Mental Status, Intoxication with alcohol or substance confusion (Disorientation, impaired judgment, poor safety awaremess, or inability to follow instructions): No  Impaired Mobility: Ambulates or transfers with assistive devices or assistance; Unable to ambulate or transer.: Yes (bed bound)  Nursing Judgement: Yes          Lines:   Peripheral IV 06/23/25 Distal;Left;Anterior Forearm (Active)        Opportunity for questions and clarification was provided.      Patient transported with:  Registered Nurse

## 2025-06-24 NOTE — SIGNIFICANT EVENT
[facility]  Rapid/Medical Response Team Note    Patient:    No Alberts 89 y.o. female, : 1935  Patient MRN: [id number]    Admission Date: 2025   Admission Diagnosis: Pneumonia due to human metapneumovirus [J12.3]  Acute respiratory failure with hypoxia and hypercarbia (HCC) [J96.01, J96.02]    RAPID RESPONSE  Rapid response called for: Hypotension    Per nursing upon arrival patient has been in the 80's/40s systolic most of the evening, MAPs of 50. Albumin ordered prior to arrival. After albumin BP improved to 100s-110s/50-60s with MAP 65+.     Patient non-verbal at this time and unable to respond to commands, per RN this patient's baseline per daughter. Patient is able to speak at baseline according to her chart, however per daughter the patient is mute with severe dementia and little movement. Per RN patient will mumble at times.    OBJECTIVE  Vitals:    25 0245   BP: (!) 111/49   Pulse: 90   Resp: 19   Temp:    SpO2: 98%      Physical Exam:  Gen: lying in bed in NAD, comfortable, will open eyes and look around room, but does not respond to commands,   HEENT: normocephalic, atraumatic  CV: RRR, S1/S2 present, well-perfused  Pulm: difficult to auscultate, poor respiratory effort, no appreciable wheezes, no crackles  Abd: S/NT/ND  MSK: no  significant edema  Skin: warm, dry, intact, no rash, fully disrobed exam not performed   Neuro: Patient not responding to commands, but will not open eyes during questioning, does not move extremities   Psych: Unable to assess, patient non-verbal at this time though patient is able to speak at baseline, per chart patient with severe dementia     ASSESSMENT/PLAN AND DISPOSITION  No Alberts is a 89 y.o. year old female admitted for Pneumonia due to human metapneumovirus [J12.3]  Acute respiratory failure with hypoxia and hypercarbia (HCC) [J96.01, J96.02]  Rapid Response Team/ Medical Response Team Called For: Hypotension     In setting of

## 2025-06-25 ENCOUNTER — APPOINTMENT (OUTPATIENT)
Facility: HOSPITAL | Age: 89
DRG: 193 | End: 2025-06-25
Attending: INTERNAL MEDICINE
Payer: MEDICARE

## 2025-06-25 ENCOUNTER — APPOINTMENT (OUTPATIENT)
Facility: HOSPITAL | Age: 89
DRG: 193 | End: 2025-06-25
Payer: MEDICARE

## 2025-06-25 LAB
ANION GAP SERPL CALC-SCNC: 18 MMOL/L (ref 3–18)
BASOPHILS # BLD: 0 K/UL (ref 0–0.1)
BASOPHILS NFR BLD: 0 % (ref 0–2)
BUN SERPL-MCNC: 28 MG/DL (ref 6–23)
BUN/CREAT SERPL: 37 (ref 12–20)
CALCIUM SERPL-MCNC: 8.3 MG/DL (ref 8.5–10.1)
CHLORIDE SERPL-SCNC: 108 MMOL/L (ref 98–107)
CO2 SERPL-SCNC: 23 MMOL/L (ref 21–32)
CREAT SERPL-MCNC: 0.77 MG/DL (ref 0.6–1.3)
DIFFERENTIAL METHOD BLD: ABNORMAL
ECHO AO ASC DIAM: 3.1 CM
ECHO AO ASCENDING AORTA INDEX: 1.87 CM/M2
ECHO AO ROOT DIAM: 3.1 CM
ECHO AO ROOT INDEX: 1.87 CM/M2
ECHO AV AREA PEAK VELOCITY: 0.5 CM2
ECHO AV AREA VTI: 0.6 CM2
ECHO AV AREA/BSA PEAK VELOCITY: 0.3 CM2/M2
ECHO AV AREA/BSA VTI: 0.4 CM2/M2
ECHO AV MEAN GRADIENT: 42 MMHG
ECHO AV MEAN VELOCITY: 3.1 M/S
ECHO AV PEAK GRADIENT: 68 MMHG
ECHO AV PEAK VELOCITY: 4.1 M/S
ECHO AV VELOCITY RATIO: 0.17
ECHO AV VTI: 109.4 CM
ECHO BSA: 1.7 M2
ECHO EST RA PRESSURE: 8 MMHG
ECHO LA VOL A-L A2C: 54 ML (ref 22–52)
ECHO LA VOL A-L A4C: 55 ML (ref 22–52)
ECHO LA VOL BP: 56 ML (ref 22–52)
ECHO LA VOL MOD A2C: 51 ML (ref 22–52)
ECHO LA VOL MOD A4C: 53 ML (ref 22–52)
ECHO LA VOL/BSA BIPLANE: 34 ML/M2 (ref 16–34)
ECHO LA VOLUME AREA LENGTH: 60 ML
ECHO LA VOLUME INDEX A-L A2C: 33 ML/M2 (ref 16–34)
ECHO LA VOLUME INDEX A-L A4C: 33 ML/M2 (ref 16–34)
ECHO LA VOLUME INDEX AREA LENGTH: 36 ML/M2 (ref 16–34)
ECHO LA VOLUME INDEX MOD A2C: 31 ML/M2 (ref 16–34)
ECHO LA VOLUME INDEX MOD A4C: 32 ML/M2 (ref 16–34)
ECHO LV E' LATERAL VELOCITY: 2.57 CM/S
ECHO LV E' SEPTAL VELOCITY: 2.84 CM/S
ECHO LV EDV A2C: 62 ML
ECHO LV EDV A4C: 66 ML
ECHO LV EDV BP: 65 ML (ref 56–104)
ECHO LV EDV INDEX A4C: 40 ML/M2
ECHO LV EDV INDEX BP: 39 ML/M2
ECHO LV EDV NDEX A2C: 37 ML/M2
ECHO LV EF PHYSICIAN: 55 %
ECHO LV EJECTION FRACTION A2C: 56 %
ECHO LV EJECTION FRACTION A4C: 59 %
ECHO LV EJECTION FRACTION BIPLANE: 59 % (ref 55–100)
ECHO LV ESV A2C: 27 ML
ECHO LV ESV A4C: 27 ML
ECHO LV ESV BP: 27 ML (ref 19–49)
ECHO LV ESV INDEX A2C: 16 ML/M2
ECHO LV ESV INDEX A4C: 16 ML/M2
ECHO LV ESV INDEX BP: 16 ML/M2
ECHO LV FRACTIONAL SHORTENING: 31 % (ref 28–44)
ECHO LV INTERNAL DIMENSION DIASTOLE INDEX: 2.35 CM/M2
ECHO LV INTERNAL DIMENSION DIASTOLIC: 3.9 CM (ref 3.9–5.3)
ECHO LV INTERNAL DIMENSION SYSTOLIC INDEX: 1.63 CM/M2
ECHO LV INTERNAL DIMENSION SYSTOLIC: 2.7 CM
ECHO LV IVSD: 0.8 CM (ref 0.6–0.9)
ECHO LV MASS 2D: 89.7 G (ref 67–162)
ECHO LV MASS INDEX 2D: 54 G/M2 (ref 43–95)
ECHO LV POSTERIOR WALL DIASTOLIC: 0.8 CM (ref 0.6–0.9)
ECHO LV RELATIVE WALL THICKNESS RATIO: 0.41
ECHO LVOT AREA: 3.1 CM2
ECHO LVOT AV VTI INDEX: 0.18
ECHO LVOT DIAM: 2 CM
ECHO LVOT MEAN GRADIENT: 1 MMHG
ECHO LVOT PEAK GRADIENT: 2 MMHG
ECHO LVOT PEAK VELOCITY: 0.7 M/S
ECHO LVOT STROKE VOLUME INDEX: 37.1 ML/M2
ECHO LVOT SV: 61.5 ML
ECHO LVOT VTI: 19.6 CM
ECHO MV A VELOCITY: 1.09 M/S
ECHO MV AREA VTI: 1.9 CM2
ECHO MV E DECELERATION TIME (DT): 193.4 MS
ECHO MV E VELOCITY: 0.82 M/S
ECHO MV E/A RATIO: 0.75
ECHO MV E/E' LATERAL: 31.91
ECHO MV E/E' RATIO (AVERAGED): 30.39
ECHO MV E/E' SEPTAL: 28.87
ECHO MV LVOT VTI INDEX: 1.64
ECHO MV MAX VELOCITY: 1.3 M/S
ECHO MV MEAN GRADIENT: 3 MMHG
ECHO MV MEAN VELOCITY: 0.9 M/S
ECHO MV PEAK GRADIENT: 6 MMHG
ECHO MV VTI: 32.2 CM
ECHO PV MAX VELOCITY: 0.9 M/S
ECHO PV PEAK GRADIENT: 3 MMHG
ECHO RA AREA 4C: 8.4 CM2
ECHO RA END SYSTOLIC VOLUME APICAL 4 CHAMBER INDEX BSA: 8 ML/M2
ECHO RA VOLUME AREA LENGTH APICAL 4 CHAMBER: 13 ML
ECHO RA VOLUME: 13 ML
ECHO RV BASAL DIMENSION: 3.6 CM
ECHO RV LONGITUDINAL DIMENSION: 5.6 CM
ECHO RV MID DIMENSION: 2.8 CM
ECHO RV TAPSE: 2 CM (ref 1.7–?)
ECHO RVOT PEAK GRADIENT: 2 MMHG
ECHO RVOT PEAK VELOCITY: 0.8 M/S
EOSINOPHIL # BLD: 0 K/UL (ref 0–0.4)
EOSINOPHIL NFR BLD: 0 % (ref 0–5)
ERYTHROCYTE [DISTWIDTH] IN BLOOD BY AUTOMATED COUNT: 15.1 % (ref 11.6–14.5)
ERYTHROCYTE [SEDIMENTATION RATE] IN BLOOD: 100 MM/HR (ref 0–30)
GLUCOSE BLD STRIP.AUTO-MCNC: 154 MG/DL (ref 70–110)
GLUCOSE BLD STRIP.AUTO-MCNC: 159 MG/DL (ref 70–110)
GLUCOSE BLD STRIP.AUTO-MCNC: 201 MG/DL (ref 70–110)
GLUCOSE BLD STRIP.AUTO-MCNC: 211 MG/DL (ref 70–110)
GLUCOSE SERPL-MCNC: 144 MG/DL (ref 74–108)
HCT VFR BLD AUTO: 38.5 % (ref 35–45)
HGB BLD-MCNC: 11.2 G/DL (ref 12–16)
IMM GRANULOCYTES # BLD AUTO: 0 K/UL (ref 0–0.04)
IMM GRANULOCYTES NFR BLD AUTO: 0 % (ref 0–0.5)
LYMPHOCYTES # BLD: 1.04 K/UL (ref 0.9–3.6)
LYMPHOCYTES NFR BLD: 13 % (ref 21–52)
MCH RBC QN AUTO: 26 PG (ref 24–34)
MCHC RBC AUTO-ENTMCNC: 29.1 G/DL (ref 31–37)
MCV RBC AUTO: 89.5 FL (ref 78–100)
MONOCYTES # BLD: 0 K/UL (ref 0.05–1.2)
MONOCYTES NFR BLD: 0 % (ref 3–10)
NEUTS SEG # BLD: 6.96 K/UL (ref 1.8–8)
NEUTS SEG NFR BLD: 87 % (ref 40–73)
NRBC # BLD: 0 K/UL (ref 0–0.01)
NRBC BLD-RTO: 0 PER 100 WBC
PLATELET # BLD AUTO: 190 K/UL (ref 135–420)
PLATELET COMMENT: ABNORMAL
PMV BLD AUTO: 10 FL (ref 9.2–11.8)
POTASSIUM SERPL-SCNC: 3.6 MMOL/L (ref 3.5–5.5)
RBC # BLD AUTO: 4.3 M/UL (ref 4.2–5.3)
RBC MORPH BLD: ABNORMAL
SODIUM SERPL-SCNC: 149 MMOL/L (ref 136–145)
WBC # BLD AUTO: 8 K/UL (ref 4.6–13.2)

## 2025-06-25 PROCEDURE — 6360000002 HC RX W HCPCS: Performed by: INTERNAL MEDICINE

## 2025-06-25 PROCEDURE — 2500000003 HC RX 250 WO HCPCS

## 2025-06-25 PROCEDURE — 6370000000 HC RX 637 (ALT 250 FOR IP): Performed by: STUDENT IN AN ORGANIZED HEALTH CARE EDUCATION/TRAINING PROGRAM

## 2025-06-25 PROCEDURE — 93306 TTE W/DOPPLER COMPLETE: CPT | Performed by: INTERNAL MEDICINE

## 2025-06-25 PROCEDURE — 6360000002 HC RX W HCPCS

## 2025-06-25 PROCEDURE — 2700000000 HC OXYGEN THERAPY PER DAY

## 2025-06-25 PROCEDURE — 94761 N-INVAS EAR/PLS OXIMETRY MLT: CPT

## 2025-06-25 PROCEDURE — 99233 SBSQ HOSP IP/OBS HIGH 50: CPT | Performed by: INTERNAL MEDICINE

## 2025-06-25 PROCEDURE — 6360000002 HC RX W HCPCS: Performed by: STUDENT IN AN ORGANIZED HEALTH CARE EDUCATION/TRAINING PROGRAM

## 2025-06-25 PROCEDURE — 2500000003 HC RX 250 WO HCPCS: Performed by: STUDENT IN AN ORGANIZED HEALTH CARE EDUCATION/TRAINING PROGRAM

## 2025-06-25 PROCEDURE — 94669 MECHANICAL CHEST WALL OSCILL: CPT

## 2025-06-25 PROCEDURE — 93306 TTE W/DOPPLER COMPLETE: CPT

## 2025-06-25 PROCEDURE — 99223 1ST HOSP IP/OBS HIGH 75: CPT | Performed by: INTERNAL MEDICINE

## 2025-06-25 PROCEDURE — 71045 X-RAY EXAM CHEST 1 VIEW: CPT

## 2025-06-25 PROCEDURE — 99232 SBSQ HOSP IP/OBS MODERATE 35: CPT | Performed by: STUDENT IN AN ORGANIZED HEALTH CARE EDUCATION/TRAINING PROGRAM

## 2025-06-25 PROCEDURE — 99233 SBSQ HOSP IP/OBS HIGH 50: CPT | Performed by: NURSE PRACTITIONER

## 2025-06-25 PROCEDURE — 2580000003 HC RX 258

## 2025-06-25 PROCEDURE — 80048 BASIC METABOLIC PNL TOTAL CA: CPT

## 2025-06-25 PROCEDURE — 92526 ORAL FUNCTION THERAPY: CPT

## 2025-06-25 PROCEDURE — 2000000000 HC ICU R&B

## 2025-06-25 PROCEDURE — 85025 COMPLETE CBC W/AUTO DIFF WBC: CPT

## 2025-06-25 PROCEDURE — 85652 RBC SED RATE AUTOMATED: CPT

## 2025-06-25 PROCEDURE — 94640 AIRWAY INHALATION TREATMENT: CPT

## 2025-06-25 PROCEDURE — 36415 COLL VENOUS BLD VENIPUNCTURE: CPT

## 2025-06-25 PROCEDURE — 2580000003 HC RX 258: Performed by: INTERNAL MEDICINE

## 2025-06-25 PROCEDURE — 82962 GLUCOSE BLOOD TEST: CPT

## 2025-06-25 PROCEDURE — 2580000003 HC RX 258: Performed by: STUDENT IN AN ORGANIZED HEALTH CARE EDUCATION/TRAINING PROGRAM

## 2025-06-25 PROCEDURE — APPSS15 APP SPLIT SHARED TIME 0-15 MINUTES

## 2025-06-25 RX ORDER — MAGNESIUM SULFATE HEPTAHYDRATE 40 MG/ML
2000 INJECTION, SOLUTION INTRAVENOUS ONCE
Status: COMPLETED | OUTPATIENT
Start: 2025-06-25 | End: 2025-06-25

## 2025-06-25 RX ORDER — LEVOFLOXACIN 5 MG/ML
750 INJECTION, SOLUTION INTRAVENOUS
Status: DISCONTINUED | OUTPATIENT
Start: 2025-06-25 | End: 2025-06-29

## 2025-06-25 RX ADMIN — ALBUTEROL SULFATE 1.25 MG: 1.25 SOLUTION RESPIRATORY (INHALATION) at 08:11

## 2025-06-25 RX ADMIN — PIPERACILLIN AND TAZOBACTAM 3375 MG: 3; .375 INJECTION, POWDER, LYOPHILIZED, FOR SOLUTION INTRAVENOUS at 10:03

## 2025-06-25 RX ADMIN — PIPERACILLIN AND TAZOBACTAM 3375 MG: 3; .375 INJECTION, POWDER, LYOPHILIZED, FOR SOLUTION INTRAVENOUS at 18:12

## 2025-06-25 RX ADMIN — AZITHROMYCIN MONOHYDRATE 500 MG: 500 INJECTION, POWDER, LYOPHILIZED, FOR SOLUTION INTRAVENOUS at 16:16

## 2025-06-25 RX ADMIN — SODIUM CHLORIDE SOLN NEBU 3% 4 ML: 3 NEBU SOLN at 19:51

## 2025-06-25 RX ADMIN — SODIUM CHLORIDE, PRESERVATIVE FREE 10 ML: 5 INJECTION INTRAVENOUS at 10:04

## 2025-06-25 RX ADMIN — SODIUM CHLORIDE, PRESERVATIVE FREE 10 ML: 5 INJECTION INTRAVENOUS at 21:43

## 2025-06-25 RX ADMIN — ALBUTEROL SULFATE 1.25 MG: 1.25 SOLUTION RESPIRATORY (INHALATION) at 19:51

## 2025-06-25 RX ADMIN — MAGNESIUM SULFATE HEPTAHYDRATE 2000 MG: 40 INJECTION, SOLUTION INTRAVENOUS at 13:28

## 2025-06-25 RX ADMIN — ENOXAPARIN SODIUM 40 MG: 100 INJECTION SUBCUTANEOUS at 09:54

## 2025-06-25 RX ADMIN — INSULIN LISPRO 1 UNITS: 100 INJECTION, SOLUTION INTRAVENOUS; SUBCUTANEOUS at 13:29

## 2025-06-25 RX ADMIN — METHYLPREDNISOLONE SODIUM SUCCINATE 40 MG: 40 INJECTION INTRAMUSCULAR; INTRAVENOUS at 04:51

## 2025-06-25 RX ADMIN — SODIUM CHLORIDE SOLN NEBU 3% 4 ML: 3 NEBU SOLN at 08:10

## 2025-06-25 RX ADMIN — LEVOFLOXACIN 750 MG: 750 INJECTION, SOLUTION INTRAVENOUS at 18:13

## 2025-06-25 RX ADMIN — PIPERACILLIN AND TAZOBACTAM 3375 MG: 3; .375 INJECTION, POWDER, LYOPHILIZED, FOR SOLUTION INTRAVENOUS at 01:50

## 2025-06-25 RX ADMIN — VANCOMYCIN HYDROCHLORIDE 1000 MG: 1 INJECTION, POWDER, LYOPHILIZED, FOR SOLUTION INTRAVENOUS at 10:06

## 2025-06-25 RX ADMIN — ARFORMOTEROL TARTRATE: 15 SOLUTION RESPIRATORY (INHALATION) at 08:10

## 2025-06-25 RX ADMIN — METHYLPREDNISOLONE SODIUM SUCCINATE 40 MG: 40 INJECTION INTRAMUSCULAR; INTRAVENOUS at 16:16

## 2025-06-25 RX ADMIN — ARFORMOTEROL TARTRATE: 15 SOLUTION RESPIRATORY (INHALATION) at 19:51

## 2025-06-25 RX ADMIN — INSULIN LISPRO 1 UNITS: 100 INJECTION, SOLUTION INTRAVENOUS; SUBCUTANEOUS at 21:44

## 2025-06-25 ASSESSMENT — PAIN SCALES - WONG BAKER: WONGBAKER_NUMERICALRESPONSE: NO HURT

## 2025-06-25 NOTE — CARE COORDINATION
Chart reviewed and spoke with patients daughter Oralia Tolentino at bedside, patient unable to appropriately answer questions due to cognitive state. HIPAA/Demographics verified. Freedom of choice explained and offered, daughter would like pt to return back to [facility], referral placed in [facility].   06/25/25 6452   Service Assessment   Patient Orientation Self;Person   Cognition Alert   History Provided By Child/Family   Primary Caregiver Other (Comment)  (LTC at [facility])   Accompanied By/Relationship Oralia Rajan-daughter   Support Systems Children  (LT- [facility])   Patient's Healthcare Decision Maker is: Named in Scanned ACP Document   PCP Verified by CM Yes  (PCP at [facility])   Prior Functional Level Assistance with the following:;Bathing;Dressing;Toileting;Feeding;Cooking;Mobility   Current Functional Level Assistance with the following:;Bathing;Dressing;Toileting;Feeding   Can patient return to prior living arrangement Yes   Ability to make needs known: Poor   Family able to assist with home care needs: Other (comment)  (lives at LTC facility)   Would you like for me to discuss the discharge plan with any other family members/significant others, and if so, who?   (Oralia Tolentino)   Financial Resources Other (Comment)  ([facility])   Community Resources None   CM/SW Referral   (discharge planning)   Social/Functional History   Lives With Other (Comment)  (LT facility)   Type of Home Facility  (LTC)   Home Layout One level   Home Access Ramped entrance   Bathroom Shower/Tub Walk-in shower   Bathroom Toilet Handicap height   Bathroom Equipment Grab bars in shower;Built-in shower seat;Shower chair;Tub transfer bench;Commode;Toilet raiser;Grab bars around toilet   Bathroom Accessibility Accessible;Wheelchair accessible;Walker accessible   Home Equipment Wheelchair - Manual;Walker - Rolling   Receives Help From Other (Comment);Family  (LT)   Prior

## 2025-06-26 LAB
ANION GAP SERPL CALC-SCNC: 13 MMOL/L (ref 3–18)
BACTERIA SPEC CULT: ABNORMAL
BASOPHILS # BLD: 0.01 K/UL (ref 0–0.1)
BASOPHILS NFR BLD: 0.1 % (ref 0–2)
BUN SERPL-MCNC: 33 MG/DL (ref 6–23)
BUN/CREAT SERPL: 42 (ref 12–20)
CALCIUM SERPL-MCNC: 8.6 MG/DL (ref 8.5–10.1)
CC UR VC: ABNORMAL
CHLORIDE SERPL-SCNC: 111 MMOL/L (ref 98–107)
CO2 SERPL-SCNC: 28 MMOL/L (ref 21–32)
CREAT SERPL-MCNC: 0.79 MG/DL (ref 0.6–1.3)
DIFFERENTIAL METHOD BLD: ABNORMAL
EOSINOPHIL # BLD: 0 K/UL (ref 0–0.4)
EOSINOPHIL NFR BLD: 0 % (ref 0–5)
ERYTHROCYTE [DISTWIDTH] IN BLOOD BY AUTOMATED COUNT: 15.1 % (ref 11.6–14.5)
GLUCOSE BLD STRIP.AUTO-MCNC: 157 MG/DL (ref 70–110)
GLUCOSE BLD STRIP.AUTO-MCNC: 188 MG/DL (ref 70–110)
GLUCOSE BLD STRIP.AUTO-MCNC: 194 MG/DL (ref 70–110)
GLUCOSE BLD STRIP.AUTO-MCNC: 203 MG/DL (ref 70–110)
GLUCOSE BLD STRIP.AUTO-MCNC: 214 MG/DL (ref 70–110)
GLUCOSE SERPL-MCNC: 197 MG/DL (ref 74–108)
HCT VFR BLD AUTO: 36.2 % (ref 35–45)
HGB BLD-MCNC: 11 G/DL (ref 12–16)
IMM GRANULOCYTES # BLD AUTO: 0.03 K/UL (ref 0–0.04)
IMM GRANULOCYTES NFR BLD AUTO: 0.4 % (ref 0–0.5)
LYMPHOCYTES # BLD: 1.02 K/UL (ref 0.9–3.6)
LYMPHOCYTES NFR BLD: 14.1 % (ref 21–52)
MCH RBC QN AUTO: 26.9 PG (ref 24–34)
MCHC RBC AUTO-ENTMCNC: 30.4 G/DL (ref 31–37)
MCV RBC AUTO: 88.5 FL (ref 78–100)
MONOCYTES # BLD: 0.17 K/UL (ref 0.05–1.2)
MONOCYTES NFR BLD: 2.4 % (ref 3–10)
NEUTS SEG # BLD: 6 K/UL (ref 1.8–8)
NEUTS SEG NFR BLD: 83 % (ref 40–73)
NRBC # BLD: 0 K/UL (ref 0–0.01)
NRBC BLD-RTO: 0 PER 100 WBC
PLATELET # BLD AUTO: 185 K/UL (ref 135–420)
PMV BLD AUTO: 10.1 FL (ref 9.2–11.8)
POTASSIUM SERPL-SCNC: 3.2 MMOL/L (ref 3.5–5.5)
RBC # BLD AUTO: 4.09 M/UL (ref 4.2–5.3)
SERVICE CMNT-IMP: ABNORMAL
SODIUM SERPL-SCNC: 152 MMOL/L (ref 136–145)
WBC # BLD AUTO: 7.2 K/UL (ref 4.6–13.2)

## 2025-06-26 PROCEDURE — 2500000003 HC RX 250 WO HCPCS

## 2025-06-26 PROCEDURE — 6360000002 HC RX W HCPCS: Performed by: INTERNAL MEDICINE

## 2025-06-26 PROCEDURE — 85025 COMPLETE CBC W/AUTO DIFF WBC: CPT

## 2025-06-26 PROCEDURE — 94667 MNPJ CHEST WALL 1ST: CPT

## 2025-06-26 PROCEDURE — 6360000002 HC RX W HCPCS: Performed by: STUDENT IN AN ORGANIZED HEALTH CARE EDUCATION/TRAINING PROGRAM

## 2025-06-26 PROCEDURE — 82962 GLUCOSE BLOOD TEST: CPT

## 2025-06-26 PROCEDURE — 6360000002 HC RX W HCPCS

## 2025-06-26 PROCEDURE — 94761 N-INVAS EAR/PLS OXIMETRY MLT: CPT

## 2025-06-26 PROCEDURE — 94640 AIRWAY INHALATION TREATMENT: CPT

## 2025-06-26 PROCEDURE — 36415 COLL VENOUS BLD VENIPUNCTURE: CPT

## 2025-06-26 PROCEDURE — 2580000003 HC RX 258: Performed by: STUDENT IN AN ORGANIZED HEALTH CARE EDUCATION/TRAINING PROGRAM

## 2025-06-26 PROCEDURE — 6370000000 HC RX 637 (ALT 250 FOR IP): Performed by: STUDENT IN AN ORGANIZED HEALTH CARE EDUCATION/TRAINING PROGRAM

## 2025-06-26 PROCEDURE — 80048 BASIC METABOLIC PNL TOTAL CA: CPT

## 2025-06-26 PROCEDURE — 2500000003 HC RX 250 WO HCPCS: Performed by: STUDENT IN AN ORGANIZED HEALTH CARE EDUCATION/TRAINING PROGRAM

## 2025-06-26 PROCEDURE — 99232 SBSQ HOSP IP/OBS MODERATE 35: CPT | Performed by: STUDENT IN AN ORGANIZED HEALTH CARE EDUCATION/TRAINING PROGRAM

## 2025-06-26 PROCEDURE — 2700000000 HC OXYGEN THERAPY PER DAY

## 2025-06-26 PROCEDURE — 2000000000 HC ICU R&B

## 2025-06-26 PROCEDURE — 99232 SBSQ HOSP IP/OBS MODERATE 35: CPT | Performed by: INTERNAL MEDICINE

## 2025-06-26 PROCEDURE — 2580000003 HC RX 258: Performed by: INTERNAL MEDICINE

## 2025-06-26 PROCEDURE — 92526 ORAL FUNCTION THERAPY: CPT

## 2025-06-26 RX ORDER — POTASSIUM CHLORIDE, DEXTROSE MONOHYDRATE 150; 5 MG/100ML; G/100ML
INJECTION, SOLUTION INTRAVENOUS CONTINUOUS
Status: DISCONTINUED | OUTPATIENT
Start: 2025-06-26 | End: 2025-06-27

## 2025-06-26 RX ADMIN — METHYLPREDNISOLONE SODIUM SUCCINATE 40 MG: 40 INJECTION INTRAMUSCULAR; INTRAVENOUS at 18:07

## 2025-06-26 RX ADMIN — SODIUM CHLORIDE, PRESERVATIVE FREE 10 ML: 5 INJECTION INTRAVENOUS at 10:01

## 2025-06-26 RX ADMIN — ALBUTEROL SULFATE 1.25 MG: 1.25 SOLUTION RESPIRATORY (INHALATION) at 09:20

## 2025-06-26 RX ADMIN — METHYLPREDNISOLONE SODIUM SUCCINATE 40 MG: 40 INJECTION INTRAMUSCULAR; INTRAVENOUS at 04:05

## 2025-06-26 RX ADMIN — MEMANTINE 10 MG: 10 TABLET ORAL at 09:59

## 2025-06-26 RX ADMIN — INSULIN LISPRO 1 UNITS: 100 INJECTION, SOLUTION INTRAVENOUS; SUBCUTANEOUS at 21:01

## 2025-06-26 RX ADMIN — SODIUM CHLORIDE, PRESERVATIVE FREE 10 ML: 5 INJECTION INTRAVENOUS at 20:52

## 2025-06-26 RX ADMIN — DEXTROSE MONOHYDRATE AND POTASSIUM CHLORIDE INJECTION, SOLUTION: 5; .149 INJECTION, SOLUTION INTRAVENOUS at 18:43

## 2025-06-26 RX ADMIN — ALBUTEROL SULFATE 1.25 MG: 1.25 SOLUTION RESPIRATORY (INHALATION) at 19:37

## 2025-06-26 RX ADMIN — SODIUM CHLORIDE SOLN NEBU 3% 4 ML: 3 NEBU SOLN at 09:20

## 2025-06-26 RX ADMIN — PIPERACILLIN AND TAZOBACTAM 3375 MG: 3; .375 INJECTION, POWDER, LYOPHILIZED, FOR SOLUTION INTRAVENOUS at 02:17

## 2025-06-26 RX ADMIN — ROSUVASTATIN CALCIUM 10 MG: 10 TABLET, FILM COATED ORAL at 20:52

## 2025-06-26 RX ADMIN — INSULIN LISPRO 1 UNITS: 100 INJECTION, SOLUTION INTRAVENOUS; SUBCUTANEOUS at 11:45

## 2025-06-26 RX ADMIN — ARFORMOTEROL TARTRATE: 15 SOLUTION RESPIRATORY (INHALATION) at 09:20

## 2025-06-26 RX ADMIN — THERA TABS 1 TABLET: TAB at 09:59

## 2025-06-26 RX ADMIN — FERROUS SULFATE TAB 325 MG (65 MG ELEMENTAL FE) 325 MG: 325 (65 FE) TAB at 09:59

## 2025-06-26 RX ADMIN — PIPERACILLIN AND TAZOBACTAM 3375 MG: 3; .375 INJECTION, POWDER, LYOPHILIZED, FOR SOLUTION INTRAVENOUS at 18:19

## 2025-06-26 RX ADMIN — INSULIN LISPRO 1 UNITS: 100 INJECTION, SOLUTION INTRAVENOUS; SUBCUTANEOUS at 05:21

## 2025-06-26 RX ADMIN — SODIUM CHLORIDE, PRESERVATIVE FREE 10 ML: 5 INJECTION INTRAVENOUS at 21:00

## 2025-06-26 RX ADMIN — SODIUM CHLORIDE SOLN NEBU 3% 4 ML: 3 NEBU SOLN at 19:37

## 2025-06-26 RX ADMIN — ARFORMOTEROL TARTRATE: 15 SOLUTION RESPIRATORY (INHALATION) at 19:37

## 2025-06-26 RX ADMIN — MEMANTINE 10 MG: 10 TABLET ORAL at 20:52

## 2025-06-26 RX ADMIN — ENOXAPARIN SODIUM 40 MG: 100 INJECTION SUBCUTANEOUS at 10:00

## 2025-06-26 RX ADMIN — PIPERACILLIN AND TAZOBACTAM 3375 MG: 3; .375 INJECTION, POWDER, LYOPHILIZED, FOR SOLUTION INTRAVENOUS at 10:16

## 2025-06-27 PROBLEM — Z22.322 MRSA (METHICILLIN RESISTANT STAPH AUREUS) CULTURE POSITIVE: Status: ACTIVE | Noted: 2025-06-27

## 2025-06-27 LAB
ANION GAP SERPL CALC-SCNC: 9 MMOL/L (ref 3–18)
BACTERIA SPEC CULT: ABNORMAL
BASOPHILS # BLD: 0 K/UL (ref 0–0.1)
BASOPHILS NFR BLD: 0 % (ref 0–2)
BUN SERPL-MCNC: 27 MG/DL (ref 6–23)
BUN/CREAT SERPL: 44 (ref 12–20)
CALCIUM SERPL-MCNC: 8.5 MG/DL (ref 8.5–10.1)
CHLORIDE SERPL-SCNC: 108 MMOL/L (ref 98–107)
CO2 SERPL-SCNC: 30 MMOL/L (ref 21–32)
CREAT SERPL-MCNC: 0.6 MG/DL (ref 0.6–1.3)
DIFFERENTIAL METHOD BLD: ABNORMAL
EOSINOPHIL # BLD: 0 K/UL (ref 0–0.4)
EOSINOPHIL NFR BLD: 0 % (ref 0–5)
ERYTHROCYTE [DISTWIDTH] IN BLOOD BY AUTOMATED COUNT: 15 % (ref 11.6–14.5)
GLUCOSE BLD STRIP.AUTO-MCNC: 168 MG/DL (ref 70–110)
GLUCOSE BLD STRIP.AUTO-MCNC: 195 MG/DL (ref 70–110)
GLUCOSE BLD STRIP.AUTO-MCNC: 223 MG/DL (ref 70–110)
GLUCOSE BLD STRIP.AUTO-MCNC: 247 MG/DL (ref 70–110)
GLUCOSE BLD STRIP.AUTO-MCNC: 258 MG/DL (ref 70–110)
GLUCOSE SERPL-MCNC: 243 MG/DL (ref 74–108)
HCT VFR BLD AUTO: 34.9 % (ref 35–45)
HGB BLD-MCNC: 10.6 G/DL (ref 12–16)
IMM GRANULOCYTES # BLD AUTO: 0.04 K/UL (ref 0–0.04)
IMM GRANULOCYTES NFR BLD AUTO: 0.5 % (ref 0–0.5)
LYMPHOCYTES # BLD: 0.76 K/UL (ref 0.9–3.6)
LYMPHOCYTES NFR BLD: 10.3 % (ref 21–52)
MCH RBC QN AUTO: 26.7 PG (ref 24–34)
MCHC RBC AUTO-ENTMCNC: 30.4 G/DL (ref 31–37)
MCV RBC AUTO: 87.9 FL (ref 78–100)
MONOCYTES # BLD: 0.1 K/UL (ref 0.05–1.2)
MONOCYTES NFR BLD: 1.4 % (ref 3–10)
NEUTS SEG # BLD: 6.47 K/UL (ref 1.8–8)
NEUTS SEG NFR BLD: 87.8 % (ref 40–73)
NRBC # BLD: 0 K/UL (ref 0–0.01)
NRBC BLD-RTO: 0 PER 100 WBC
PLATELET # BLD AUTO: 189 K/UL (ref 135–420)
PMV BLD AUTO: 9.9 FL (ref 9.2–11.8)
POTASSIUM SERPL-SCNC: 2.9 MMOL/L (ref 3.5–5.5)
POTASSIUM SERPL-SCNC: 4.2 MMOL/L (ref 3.5–5.5)
RBC # BLD AUTO: 3.97 M/UL (ref 4.2–5.3)
SERVICE CMNT-IMP: ABNORMAL
SODIUM SERPL-SCNC: 147 MMOL/L (ref 136–145)
WBC # BLD AUTO: 7.4 K/UL (ref 4.6–13.2)

## 2025-06-27 PROCEDURE — 92526 ORAL FUNCTION THERAPY: CPT

## 2025-06-27 PROCEDURE — 99232 SBSQ HOSP IP/OBS MODERATE 35: CPT | Performed by: NURSE PRACTITIONER

## 2025-06-27 PROCEDURE — 6370000000 HC RX 637 (ALT 250 FOR IP): Performed by: STUDENT IN AN ORGANIZED HEALTH CARE EDUCATION/TRAINING PROGRAM

## 2025-06-27 PROCEDURE — 84132 ASSAY OF SERUM POTASSIUM: CPT

## 2025-06-27 PROCEDURE — 2500000003 HC RX 250 WO HCPCS: Performed by: STUDENT IN AN ORGANIZED HEALTH CARE EDUCATION/TRAINING PROGRAM

## 2025-06-27 PROCEDURE — 6360000002 HC RX W HCPCS: Performed by: INTERNAL MEDICINE

## 2025-06-27 PROCEDURE — 2700000000 HC OXYGEN THERAPY PER DAY

## 2025-06-27 PROCEDURE — 2580000003 HC RX 258: Performed by: STUDENT IN AN ORGANIZED HEALTH CARE EDUCATION/TRAINING PROGRAM

## 2025-06-27 PROCEDURE — 85025 COMPLETE CBC W/AUTO DIFF WBC: CPT

## 2025-06-27 PROCEDURE — 2580000003 HC RX 258: Performed by: INTERNAL MEDICINE

## 2025-06-27 PROCEDURE — 36415 COLL VENOUS BLD VENIPUNCTURE: CPT

## 2025-06-27 PROCEDURE — 6360000002 HC RX W HCPCS: Performed by: STUDENT IN AN ORGANIZED HEALTH CARE EDUCATION/TRAINING PROGRAM

## 2025-06-27 PROCEDURE — 82962 GLUCOSE BLOOD TEST: CPT

## 2025-06-27 PROCEDURE — 2000000000 HC ICU R&B

## 2025-06-27 PROCEDURE — 94761 N-INVAS EAR/PLS OXIMETRY MLT: CPT

## 2025-06-27 PROCEDURE — 2500000003 HC RX 250 WO HCPCS

## 2025-06-27 PROCEDURE — 94640 AIRWAY INHALATION TREATMENT: CPT

## 2025-06-27 PROCEDURE — 80048 BASIC METABOLIC PNL TOTAL CA: CPT

## 2025-06-27 PROCEDURE — 6360000002 HC RX W HCPCS

## 2025-06-27 PROCEDURE — 99232 SBSQ HOSP IP/OBS MODERATE 35: CPT | Performed by: INTERNAL MEDICINE

## 2025-06-27 RX ORDER — PREDNISONE 20 MG/1
40 TABLET ORAL DAILY
Status: DISCONTINUED | OUTPATIENT
Start: 2025-06-27 | End: 2025-06-30 | Stop reason: HOSPADM

## 2025-06-27 RX ADMIN — LEVOFLOXACIN 750 MG: 750 INJECTION, SOLUTION INTRAVENOUS at 17:45

## 2025-06-27 RX ADMIN — INSULIN LISPRO 2 UNITS: 100 INJECTION, SOLUTION INTRAVENOUS; SUBCUTANEOUS at 12:05

## 2025-06-27 RX ADMIN — SODIUM CHLORIDE SOLN NEBU 3% 4 ML: 3 NEBU SOLN at 20:18

## 2025-06-27 RX ADMIN — INSULIN LISPRO 1 UNITS: 100 INJECTION, SOLUTION INTRAVENOUS; SUBCUTANEOUS at 20:13

## 2025-06-27 RX ADMIN — PIPERACILLIN AND TAZOBACTAM 3375 MG: 3; .375 INJECTION, POWDER, LYOPHILIZED, FOR SOLUTION INTRAVENOUS at 11:03

## 2025-06-27 RX ADMIN — INSULIN LISPRO 1 UNITS: 100 INJECTION, SOLUTION INTRAVENOUS; SUBCUTANEOUS at 17:42

## 2025-06-27 RX ADMIN — LEVOTHYROXINE SODIUM 150 MCG: 112 TABLET ORAL at 09:36

## 2025-06-27 RX ADMIN — METHYLPREDNISOLONE SODIUM SUCCINATE 40 MG: 40 INJECTION INTRAMUSCULAR; INTRAVENOUS at 03:29

## 2025-06-27 RX ADMIN — MEMANTINE 10 MG: 10 TABLET ORAL at 20:01

## 2025-06-27 RX ADMIN — DEXTROSE MONOHYDRATE AND POTASSIUM CHLORIDE INJECTION, SOLUTION: 5; .149 INJECTION, SOLUTION INTRAVENOUS at 15:43

## 2025-06-27 RX ADMIN — ARFORMOTEROL TARTRATE: 15 SOLUTION RESPIRATORY (INHALATION) at 07:39

## 2025-06-27 RX ADMIN — ALBUTEROL SULFATE 1.25 MG: 1.25 SOLUTION RESPIRATORY (INHALATION) at 20:18

## 2025-06-27 RX ADMIN — SODIUM CHLORIDE SOLN NEBU 3% 4 ML: 3 NEBU SOLN at 07:39

## 2025-06-27 RX ADMIN — POTASSIUM CHLORIDE 10 MEQ: 7.46 INJECTION, SOLUTION INTRAVENOUS at 03:13

## 2025-06-27 RX ADMIN — ALBUTEROL SULFATE 1.25 MG: 1.25 SOLUTION RESPIRATORY (INHALATION) at 07:39

## 2025-06-27 RX ADMIN — POTASSIUM CHLORIDE 10 MEQ: 7.46 INJECTION, SOLUTION INTRAVENOUS at 08:38

## 2025-06-27 RX ADMIN — PIPERACILLIN AND TAZOBACTAM 3375 MG: 3; .375 INJECTION, POWDER, LYOPHILIZED, FOR SOLUTION INTRAVENOUS at 18:04

## 2025-06-27 RX ADMIN — PIPERACILLIN AND TAZOBACTAM 3375 MG: 3; .375 INJECTION, POWDER, LYOPHILIZED, FOR SOLUTION INTRAVENOUS at 03:27

## 2025-06-27 RX ADMIN — SODIUM CHLORIDE, PRESERVATIVE FREE 10 ML: 5 INJECTION INTRAVENOUS at 20:01

## 2025-06-27 RX ADMIN — POTASSIUM CHLORIDE 10 MEQ: 7.46 INJECTION, SOLUTION INTRAVENOUS at 09:31

## 2025-06-27 RX ADMIN — ROSUVASTATIN CALCIUM 10 MG: 10 TABLET, FILM COATED ORAL at 20:01

## 2025-06-27 RX ADMIN — ARFORMOTEROL TARTRATE: 15 SOLUTION RESPIRATORY (INHALATION) at 20:17

## 2025-06-27 RX ADMIN — POTASSIUM CHLORIDE 10 MEQ: 7.46 INJECTION, SOLUTION INTRAVENOUS at 07:28

## 2025-06-27 RX ADMIN — INSULIN LISPRO 1 UNITS: 100 INJECTION, SOLUTION INTRAVENOUS; SUBCUTANEOUS at 09:30

## 2025-06-27 RX ADMIN — SODIUM CHLORIDE, PRESERVATIVE FREE 10 ML: 5 INJECTION INTRAVENOUS at 09:30

## 2025-06-27 RX ADMIN — PANTOPRAZOLE SODIUM 40 MG: 40 TABLET, DELAYED RELEASE ORAL at 09:36

## 2025-06-27 RX ADMIN — THERA TABS 1 TABLET: TAB at 09:30

## 2025-06-27 RX ADMIN — POTASSIUM CHLORIDE 10 MEQ: 7.46 INJECTION, SOLUTION INTRAVENOUS at 11:58

## 2025-06-27 RX ADMIN — MEMANTINE 10 MG: 10 TABLET ORAL at 09:30

## 2025-06-27 RX ADMIN — POTASSIUM CHLORIDE 10 MEQ: 7.46 INJECTION, SOLUTION INTRAVENOUS at 13:07

## 2025-06-27 RX ADMIN — ENOXAPARIN SODIUM 40 MG: 100 INJECTION SUBCUTANEOUS at 09:29

## 2025-06-27 RX ADMIN — POTASSIUM CHLORIDE 10 MEQ: 7.46 INJECTION, SOLUTION INTRAVENOUS at 10:39

## 2025-06-27 ASSESSMENT — PAIN SCALES - GENERAL
PAINLEVEL_OUTOF10: 0
PAINLEVEL_OUTOF10: 0

## 2025-06-27 NOTE — CARE COORDINATION
Chart reviewed noted from Palliative note possible need for hospice at discharge, cm called patients daughter Oralia [phone] to discuss Hospice agencies, daughter unavailable cm left voice message with return call back number.

## 2025-06-27 NOTE — ACP (ADVANCE CARE PLANNING)
Advance Care Planning     Palliative Team Advance Care Planning (ACP) Conversation    Date of Conversation: 06/27/25     ACP documents on file prior to discussion:  -Power of  for Healthcare  -[place]    Healthcare Decision Maker:    Primary Decision Maker: Oralia Tolentino - Child - [phone]     Conversation Summary:    Follow up visit made to patient along with Palliative team member PRASAD Marks NP. Patient is resting comfortably in bed, awake and alert. Respirations are easy and non labored, oxygen in use. Patient did not respond to our questions, moaning slightly. Bedside RN in room.    Call placed to patient's daughter Oralia at [phone]. Provided brief update, daughter is in agreement to a hospice consult and hospice services at discharge with [facility]. Palliative NP to put in the hospice consult at this time.  Daughter very appreciative of our call and the great care her mother is receiving.    Palliative team remains available to provide support to Ms Alberts and her family during this hospital stay.    Resuscitation Status:   Code Status: DNR     Documentation Completed:  -No new documents completed.    Aletha Suarez RN  Palliative Medicine Inpatient RN  Palliative [facility] Line: [phone]

## 2025-06-27 NOTE — CARE COORDINATION
Return call received from patient's daughter Oralia, daughter stated yes that she would like hospice services at discharge and due to her mother being in the [facility] program recommends cm  to contact Elias Scherer [phone] for hospice  arrangements, cm spoke to  [facility] Sc[facility] representative informed cm that Hospice order and discharge summary will need to be faxed to  [place] Nurse Practitioner VLADIMIR Pate @ [phone] at discharge.

## 2025-06-27 NOTE — CONSULTS
Consult Note      Consult requested by: Nathaniel Ronquillo DO    ADMIT DATE: 6/23/2025  CONSULT DATE: June 27, 2025                 Admission diagnosis: Pneumonia due to human metapneumovirus   Reason for Nephrology Consultation: hypernatremia      Assessment:     1) Hypernatremia d/t free water deficit/improving with IVF   2) respiratory failure d/t COPD exacerbation/PNA  3) Pericardial effusion   4) Hypothyroidism   5) hypokalemia   6) poor functional status   7) Klebsiella UTI.    Recommendations:   free water deficit d/t thickened liquids  Continue Gentle d5w + 20 kcl @ 50 cc/hrs  Replace K   Monitor electrolytes     Please call with questions    Gil Vasquez MD FASN  Cell [phone]  Pager: [phone]  Fax   [phone]       HPI: No Alberts is a 89 y.o. female White (non-)  with a past medical history of COPD, DM, GERD, HFpEF, HTN, HLD, Alzheimer's dementia, hypothyroidism, presented to the ED on 6/23 with altered mental status, fever, SOB and productive cough. RVP (+)human metapneumovirus. CT chest revealed multilobar pneumonia with RLL consolidation concerning for chronic aspiration. ABG revealed respiratory acidosis. She was placed on vapotherm initially with improvement. Patient was subsequently admitted to the floor for acute COPD exacerbation in the setting of above and sepsis 2/2 CAP and Klebsiella UTI. Echo 6/25/25 revealed EF 55-60%, grade II diastolic dysfunction, severe aortic valve stenosis, and moderate to large circumferential pericardial effusion. Cards consulted and managing. Patient was noted to have hypernatremia , so nephro consulted       Past Medical History:   Diagnosis Date    Alzheimer's dementia (HCC)     Arthritis     osteo    Diabetes (HCC)     GERD (gastroesophageal reflux disease)     Heart failure (HCC)     history of    Hypercholesteremia     Hypertension     Ill-defined condition     Kidney stones     Morbid obesity (HCC)     Psychiatric disorder     
  [facility]  Pulmonary, Critical Care, and Sleep Medicine    Name: No Alberts MRN: [id number]   : 1935 Hospital: [facility]   Date: 2025        Pulmonary Medicine: Initial Patient Consult    Admission Date:   2025  LOS: 1  MAR reviewed and pertinent medications noted or modified as needed    IMPRESSION:   Acute hypoxic Hypercapnic respiratory failure, required 100%/40L HHFNC, weaning down  Multilobar pneumonia, with consolidation of RLL  negative strep pneumo/legionella, procal 0.21  Concern for chronic aspiration  AE-COPD exacerbation in setting of HMPV infection, with respiratory acidosis on ABG 25  Viral URI/LRTI due to human metapneumovirus  HFpEF  Circumferential pleural effusion, worsening, now between 1.4 and 2.3cm right lateral to posterior on CT imaging 25; was <1cm on echo 24  Trace left pleural effusion  SIRS with fever, hypoxia, AMS, tachypnea, tachycardia  Acute encephalopathy, toxic vs metabolic with baseline dementia, possible contribution from CO2 narcosis  Hypernatremia, ?poor PO itake  CODE STATUS: DNR No additional code details       Patient Active Problem List   Diagnosis    Wound dehiscence    Lower GI bleeding    Leg swelling    Anemia due to blood loss    Diarrhea    Myxedema    HTN (hypertension)    Acute on chronic congestive heart failure, unspecified heart failure type (HCC)    Acute decompensated heart failure (HCC)    Acute hypoxemic respiratory failure (HCC)    Severe aortic stenosis    Hypokalemia    Pneumonia due to human metapneumovirus    Encounter for palliative care    Goals of care, counseling/discussion    Sepsis (HCC)    Moderate Alzheimer's dementia (HCC)    Advanced age    Acute respiratory failure with hypoxia and hypercarbia (HCC)         RECOMMENDATIONS:     PULM:  Maintain aspiration precautions: HOB >30 degrees  SpO2 goal 88- 92%, AVOID Over oxygenation    Bronchial /oral hygiene; 
[facility] - Consult Note      Cardiology consultation request from Dr. Ronquillo for evaluation and management/treatment of pericardial effusion    Date of  Admission: 6/23/2025 12:04 PM   Primary Care Physician:  Kerry Booker MD    Attending Cardiologist: Dr. Adolfo Celis       Assessment:     -Acute hypoxic hypercapnic respiratory failure, multilobar pneumonia, COPD exacerbation in setting of human metapneumovirus infection  -Pericardial effusion, moderate to large by Echo 6/25/2025  -UTI, Ucx 6/23/2025 with > 100K colonies/mL Klebsiella pneumoniae  -Thyroid disorder, on Synthroid, TSH elevated at 45.4 on 6/24/2025  -Hx HTN  -DMII  -Alzheimer's    Consult by Dr. Adolfo Celis     Plan:         I saw, evaluated, interviewed and examined the patient personally.  Patient admitted with altered mental status, fever and hypoxia.  Echo suggested severe aortic stenosis as well as pericardial effusion  Clinically patient hemodynamically stable, not tachycardic, blood pressure normal.  Systolic murmur appreciated  Coarse rhonchi    I reviewed echo images.  Patient has altered mental status and baseline dementia and Alzheimer's  I did discuss with daughter, Oralia, over the phone and discussed about valvular heart disease and echo finding about pericardial fusion  Because of patient's advanced age, with comorbidity and dementia and Alzheimer's disease, she would like only conservative management which is appropriate.  Will be available as needed.  Please call with question    Significant time spent in reviewing the case, multiple EMR databases, physician notes, reviewing pertinent labs and imaging studies  I spent significant amount of time for medical decision making and updated history, and other providers assessments as well.  I personally agree with the findings as stated and the plan as documented.  I saw, examined, and evaluated this patient and performed the substantive portion of the encounter for > 
Palliative Medicine  Patient Name: No Alberts  YOB: 1935  MRN: [id number]  Age: 89 y.o.  Gender: female    Date of Initial Consult: 6/24/2025   Date of Service: 6/24/2025  Time: 12:57 PM  Provider: ELIZABETH Bianchi NP  Hospital Day: 2  Admit Date: 6/23/2025  Referring Provider: hospital team        Reasons for Consultation:  Goals of Care    HISTORY OF PRESENT ILLNESS (HPI):   No Alberts is a 89 y.o. female with a past medical history of COPD, hypothyroidism, hyperlipidemia, hypertension, diabetes, chronic heart failure with preserved EF, and dementia who was admitted on 6/23/2025 from [facility] and rehab with a diagnosis of sepsis and acute resp failure . Patient was brought to the hospital for altered mental status and fever. In the ER she tested positive for human metapneumovirus. Early this am RRT was called for hypotension. Palliative medicine is consulted for goals of care discussions.     6/24/2025 patient will alert to her name. Answers a few questions most of her speech can not answer. Per daughter she is very [place]. Overall appears comfortable.           PALLIATIVE DIAGNOSES:    Encounter for palliative care / advanced care plan discussions  Goals of care   Sepsis   Acute resp failure   Pneumonia   Dementia  Advanced age    ASSESSMENT AND PLAN:   Encounter for palliative care / advanced care plan discussions    June 24, 2025 Patient seen along with Ms Erick RN. She will alert to her name answers a few simple questions. Currently she can not participate in her medical discussions. Her daughter Oralia and LATOSHA later presented to bedside. We introduced our team and purpose for visit.     AMD patient has previously completed an AMD naming her daughter Oralia as MPOA her niece Marline as secondary MPOA. Per daughter Marline has since passed away.     Social patient lives in a nursing facility. She is a  .     Functional level prior to hospitalization patient is w/c bound 
Exam:    General: Laying on the bed, appears tired, easily arousable  skin: warm, dry, no rashes  Eyes:  sclera is non-icteric  HENT: normocephalic/atraumatic, moist mucus membranes  Respiratory: Bilateral chest was equal,+ bilateral rails  Cardiovascular: RRR, no cyanosis; no peripheral edema of extremities  GI: soft, non-tender, normal bowel sounds  MSK: Moves extremities spontaneously  Skin: Warm and dry  Neuro: Awake, no gross motor or sensory deficits noted   psych: Calm    Labs: Results:   Chemistry Recent Labs     06/23/25  1219 06/23/25  1312 06/24/25  0301   GLUCOSE 148*  --  107     --  148*   K 4.3  --  4.3   *  --  112*   CO2 29  --  24   BUN 32*  --  29*   CREATININE 0.78 0.82 0.77   GLOB 3.5  --  3.1   ALT 7*  --  5*   AST 17  --  17      CBC w/Diff Recent Labs     06/23/25  1219 06/24/25  0301   WBC 10.7 8.5   RBC 4.41 4.23   HGB 12.0 11.3*   HCT 40.5 38.4    144      Microbiology Results       Procedure Component Value Units Date/Time    Culture, MRSA, Screening [[id number]]     Order Status: Sent Specimen: Nares     Culture, MRSA, Screening [[id number]]     Order Status: Canceled Specimen: Nares     Urine Culture [[id number]] Collected: 06/23/25 1711    Order Status: Sent Specimen: Urine, clean catch Updated: 06/24/25 0003    Legionella antigen, urine [[id number]] Collected: 06/23/25 1711    Order Status: Canceled Specimen: Urine     Strep Pneumoniae Antigen [[id number]] Collected: 06/23/25 1711    Order Status: Canceled Specimen: Urine, clean catch     Culture, MRSA, Screening [[id number]]     Order Status: Canceled Specimen: Nares     Blood Culture 2 [[id number]] Collected: 06/23/25 1254    Order Status: Completed Specimen: Blood Updated: 06/24/25 0724     Special Requests NO SPECIAL REQUESTS        Culture NO GROWTH AFTER 16 HOURS       Respiratory Panel, Molecular, with COVID-19 (Restricted: peds pts or suitable admitted adults) [[id number]]  (Abnormal) Collected: 06/23/25

## 2025-06-28 LAB
ANION GAP SERPL CALC-SCNC: 13 MMOL/L (ref 3–18)
BASOPHILS # BLD: 0 K/UL (ref 0–0.1)
BASOPHILS NFR BLD: 0 % (ref 0–2)
BUN SERPL-MCNC: 25 MG/DL (ref 6–23)
BUN/CREAT SERPL: 42 (ref 12–20)
CALCIUM SERPL-MCNC: 8.7 MG/DL (ref 8.5–10.1)
CHLORIDE SERPL-SCNC: 106 MMOL/L (ref 98–107)
CO2 SERPL-SCNC: 23 MMOL/L (ref 21–32)
CREAT SERPL-MCNC: 0.58 MG/DL (ref 0.6–1.3)
DIFFERENTIAL METHOD BLD: ABNORMAL
EOSINOPHIL # BLD: 0.02 K/UL (ref 0–0.4)
EOSINOPHIL NFR BLD: 0.2 % (ref 0–5)
ERYTHROCYTE [DISTWIDTH] IN BLOOD BY AUTOMATED COUNT: 14.9 % (ref 11.6–14.5)
GLUCOSE BLD STRIP.AUTO-MCNC: 159 MG/DL (ref 70–110)
GLUCOSE BLD STRIP.AUTO-MCNC: 178 MG/DL (ref 70–110)
GLUCOSE BLD STRIP.AUTO-MCNC: 250 MG/DL (ref 70–110)
GLUCOSE BLD STRIP.AUTO-MCNC: 317 MG/DL (ref 70–110)
GLUCOSE SERPL-MCNC: 148 MG/DL (ref 74–108)
HCT VFR BLD AUTO: 37.9 % (ref 35–45)
HGB BLD-MCNC: 11.2 G/DL (ref 12–16)
IMM GRANULOCYTES # BLD AUTO: 0.03 K/UL (ref 0–0.04)
IMM GRANULOCYTES NFR BLD AUTO: 0.3 % (ref 0–0.5)
LYMPHOCYTES # BLD: 2.18 K/UL (ref 0.9–3.6)
LYMPHOCYTES NFR BLD: 24.9 % (ref 21–52)
MCH RBC QN AUTO: 26.4 PG (ref 24–34)
MCHC RBC AUTO-ENTMCNC: 29.6 G/DL (ref 31–37)
MCV RBC AUTO: 89.4 FL (ref 78–100)
MONOCYTES # BLD: 0.39 K/UL (ref 0.05–1.2)
MONOCYTES NFR BLD: 4.5 % (ref 3–10)
NEUTS SEG # BLD: 6.14 K/UL (ref 1.8–8)
NEUTS SEG NFR BLD: 70.1 % (ref 40–73)
NRBC # BLD: 0 K/UL (ref 0–0.01)
NRBC BLD-RTO: 0 PER 100 WBC
PLATELET # BLD AUTO: 174 K/UL (ref 135–420)
PMV BLD AUTO: 10.5 FL (ref 9.2–11.8)
POTASSIUM SERPL-SCNC: 4.4 MMOL/L (ref 3.5–5.5)
RBC # BLD AUTO: 4.24 M/UL (ref 4.2–5.3)
SODIUM SERPL-SCNC: 143 MMOL/L (ref 136–145)
WBC # BLD AUTO: 8.8 K/UL (ref 4.6–13.2)

## 2025-06-28 PROCEDURE — 51798 US URINE CAPACITY MEASURE: CPT

## 2025-06-28 PROCEDURE — 94664 DEMO&/EVAL PT USE INHALER: CPT

## 2025-06-28 PROCEDURE — 6370000000 HC RX 637 (ALT 250 FOR IP): Performed by: STUDENT IN AN ORGANIZED HEALTH CARE EDUCATION/TRAINING PROGRAM

## 2025-06-28 PROCEDURE — 6370000000 HC RX 637 (ALT 250 FOR IP): Performed by: INTERNAL MEDICINE

## 2025-06-28 PROCEDURE — 1100000003 HC PRIVATE W/ TELEMETRY

## 2025-06-28 PROCEDURE — 6360000002 HC RX W HCPCS: Performed by: STUDENT IN AN ORGANIZED HEALTH CARE EDUCATION/TRAINING PROGRAM

## 2025-06-28 PROCEDURE — 2500000003 HC RX 250 WO HCPCS: Performed by: STUDENT IN AN ORGANIZED HEALTH CARE EDUCATION/TRAINING PROGRAM

## 2025-06-28 PROCEDURE — 82962 GLUCOSE BLOOD TEST: CPT

## 2025-06-28 PROCEDURE — 99232 SBSQ HOSP IP/OBS MODERATE 35: CPT | Performed by: NURSE PRACTITIONER

## 2025-06-28 PROCEDURE — 2580000003 HC RX 258: Performed by: STUDENT IN AN ORGANIZED HEALTH CARE EDUCATION/TRAINING PROGRAM

## 2025-06-28 PROCEDURE — 94640 AIRWAY INHALATION TREATMENT: CPT

## 2025-06-28 PROCEDURE — 2700000000 HC OXYGEN THERAPY PER DAY

## 2025-06-28 PROCEDURE — 85025 COMPLETE CBC W/AUTO DIFF WBC: CPT

## 2025-06-28 PROCEDURE — 36415 COLL VENOUS BLD VENIPUNCTURE: CPT

## 2025-06-28 PROCEDURE — 94761 N-INVAS EAR/PLS OXIMETRY MLT: CPT

## 2025-06-28 PROCEDURE — 80048 BASIC METABOLIC PNL TOTAL CA: CPT

## 2025-06-28 RX ADMIN — PIPERACILLIN AND TAZOBACTAM 3375 MG: 3; .375 INJECTION, POWDER, LYOPHILIZED, FOR SOLUTION INTRAVENOUS at 17:29

## 2025-06-28 RX ADMIN — SODIUM CHLORIDE, PRESERVATIVE FREE 10 ML: 5 INJECTION INTRAVENOUS at 09:16

## 2025-06-28 RX ADMIN — PREDNISONE 40 MG: 20 TABLET ORAL at 09:15

## 2025-06-28 RX ADMIN — ENOXAPARIN SODIUM 40 MG: 100 INJECTION SUBCUTANEOUS at 09:15

## 2025-06-28 RX ADMIN — PIPERACILLIN AND TAZOBACTAM 3375 MG: 3; .375 INJECTION, POWDER, LYOPHILIZED, FOR SOLUTION INTRAVENOUS at 09:42

## 2025-06-28 RX ADMIN — PIPERACILLIN AND TAZOBACTAM 3375 MG: 3; .375 INJECTION, POWDER, LYOPHILIZED, FOR SOLUTION INTRAVENOUS at 02:43

## 2025-06-28 RX ADMIN — MEMANTINE 10 MG: 10 TABLET ORAL at 20:55

## 2025-06-28 RX ADMIN — ARFORMOTEROL TARTRATE: 15 SOLUTION RESPIRATORY (INHALATION) at 07:35

## 2025-06-28 RX ADMIN — PANTOPRAZOLE SODIUM 40 MG: 40 TABLET, DELAYED RELEASE ORAL at 09:15

## 2025-06-28 RX ADMIN — FERROUS SULFATE TAB 325 MG (65 MG ELEMENTAL FE) 325 MG: 325 (65 FE) TAB at 09:15

## 2025-06-28 RX ADMIN — SODIUM CHLORIDE, PRESERVATIVE FREE 10 ML: 5 INJECTION INTRAVENOUS at 20:55

## 2025-06-28 RX ADMIN — ROSUVASTATIN CALCIUM 10 MG: 10 TABLET, FILM COATED ORAL at 20:55

## 2025-06-28 RX ADMIN — INSULIN LISPRO 3 UNITS: 100 INJECTION, SOLUTION INTRAVENOUS; SUBCUTANEOUS at 20:55

## 2025-06-28 RX ADMIN — INSULIN LISPRO 2 UNITS: 100 INJECTION, SOLUTION INTRAVENOUS; SUBCUTANEOUS at 17:29

## 2025-06-28 RX ADMIN — MEMANTINE 10 MG: 10 TABLET ORAL at 09:15

## 2025-06-28 RX ADMIN — LEVOTHYROXINE SODIUM 150 MCG: 112 TABLET ORAL at 09:15

## 2025-06-28 RX ADMIN — THERA TABS 1 TABLET: TAB at 09:15

## 2025-06-29 LAB
ANION GAP SERPL CALC-SCNC: 9 MMOL/L (ref 3–18)
BACTERIA SPEC CULT: NORMAL
BACTERIA SPEC CULT: NORMAL
BASOPHILS # BLD: 0 K/UL (ref 0–0.1)
BASOPHILS NFR BLD: 0 % (ref 0–2)
BUN SERPL-MCNC: 23 MG/DL (ref 6–23)
BUN/CREAT SERPL: 36 (ref 12–20)
CALCIUM SERPL-MCNC: 8.2 MG/DL (ref 8.5–10.1)
CHLORIDE SERPL-SCNC: 103 MMOL/L (ref 98–107)
CO2 SERPL-SCNC: 28 MMOL/L (ref 21–32)
CREAT SERPL-MCNC: 0.65 MG/DL (ref 0.6–1.3)
DIFFERENTIAL METHOD BLD: ABNORMAL
EOSINOPHIL # BLD: 0 K/UL (ref 0–0.4)
EOSINOPHIL NFR BLD: 0 % (ref 0–5)
ERYTHROCYTE [DISTWIDTH] IN BLOOD BY AUTOMATED COUNT: 14.7 % (ref 11.6–14.5)
GLUCOSE BLD STRIP.AUTO-MCNC: 161 MG/DL (ref 70–110)
GLUCOSE BLD STRIP.AUTO-MCNC: 172 MG/DL (ref 70–110)
GLUCOSE BLD STRIP.AUTO-MCNC: 266 MG/DL (ref 70–110)
GLUCOSE BLD STRIP.AUTO-MCNC: 328 MG/DL (ref 70–110)
GLUCOSE SERPL-MCNC: 265 MG/DL (ref 74–108)
HCT VFR BLD AUTO: 36.1 % (ref 35–45)
HGB BLD-MCNC: 11 G/DL (ref 12–16)
IMM GRANULOCYTES # BLD AUTO: 0.05 K/UL (ref 0–0.04)
IMM GRANULOCYTES NFR BLD AUTO: 0.5 % (ref 0–0.5)
LYMPHOCYTES # BLD: 1.59 K/UL (ref 0.9–3.6)
LYMPHOCYTES NFR BLD: 17.2 % (ref 21–52)
MCH RBC QN AUTO: 26.1 PG (ref 24–34)
MCHC RBC AUTO-ENTMCNC: 30.5 G/DL (ref 31–37)
MCV RBC AUTO: 85.7 FL (ref 78–100)
MONOCYTES # BLD: 0.31 K/UL (ref 0.05–1.2)
MONOCYTES NFR BLD: 3.3 % (ref 3–10)
NEUTS SEG # BLD: 7.31 K/UL (ref 1.8–8)
NEUTS SEG NFR BLD: 79 % (ref 40–73)
NRBC # BLD: 0 K/UL (ref 0–0.01)
NRBC BLD-RTO: 0 PER 100 WBC
PLATELET # BLD AUTO: 200 K/UL (ref 135–420)
PMV BLD AUTO: 10.1 FL (ref 9.2–11.8)
POTASSIUM SERPL-SCNC: 4 MMOL/L (ref 3.5–5.5)
RBC # BLD AUTO: 4.21 M/UL (ref 4.2–5.3)
SERVICE CMNT-IMP: NORMAL
SERVICE CMNT-IMP: NORMAL
SODIUM SERPL-SCNC: 139 MMOL/L (ref 136–145)
WBC # BLD AUTO: 9.3 K/UL (ref 4.6–13.2)

## 2025-06-29 PROCEDURE — 6360000002 HC RX W HCPCS: Performed by: INTERNAL MEDICINE

## 2025-06-29 PROCEDURE — 6360000002 HC RX W HCPCS: Performed by: STUDENT IN AN ORGANIZED HEALTH CARE EDUCATION/TRAINING PROGRAM

## 2025-06-29 PROCEDURE — 51798 US URINE CAPACITY MEASURE: CPT

## 2025-06-29 PROCEDURE — 2580000003 HC RX 258: Performed by: STUDENT IN AN ORGANIZED HEALTH CARE EDUCATION/TRAINING PROGRAM

## 2025-06-29 PROCEDURE — 82962 GLUCOSE BLOOD TEST: CPT

## 2025-06-29 PROCEDURE — 1100000003 HC PRIVATE W/ TELEMETRY

## 2025-06-29 PROCEDURE — 6370000000 HC RX 637 (ALT 250 FOR IP): Performed by: STUDENT IN AN ORGANIZED HEALTH CARE EDUCATION/TRAINING PROGRAM

## 2025-06-29 PROCEDURE — 36415 COLL VENOUS BLD VENIPUNCTURE: CPT

## 2025-06-29 PROCEDURE — 99231 SBSQ HOSP IP/OBS SF/LOW 25: CPT | Performed by: NURSE PRACTITIONER

## 2025-06-29 PROCEDURE — 85025 COMPLETE CBC W/AUTO DIFF WBC: CPT

## 2025-06-29 PROCEDURE — 94640 AIRWAY INHALATION TREATMENT: CPT

## 2025-06-29 PROCEDURE — 80048 BASIC METABOLIC PNL TOTAL CA: CPT

## 2025-06-29 PROCEDURE — 2500000003 HC RX 250 WO HCPCS: Performed by: STUDENT IN AN ORGANIZED HEALTH CARE EDUCATION/TRAINING PROGRAM

## 2025-06-29 PROCEDURE — 6370000000 HC RX 637 (ALT 250 FOR IP): Performed by: INTERNAL MEDICINE

## 2025-06-29 RX ORDER — LEVOFLOXACIN 5 MG/ML
750 INJECTION, SOLUTION INTRAVENOUS EVERY 24 HOURS
Status: DISCONTINUED | OUTPATIENT
Start: 2025-06-29 | End: 2025-06-30

## 2025-06-29 RX ADMIN — THERA TABS 1 TABLET: TAB at 09:14

## 2025-06-29 RX ADMIN — PANTOPRAZOLE SODIUM 40 MG: 40 TABLET, DELAYED RELEASE ORAL at 06:52

## 2025-06-29 RX ADMIN — PREDNISONE 40 MG: 20 TABLET ORAL at 09:14

## 2025-06-29 RX ADMIN — ARFORMOTEROL TARTRATE: 15 SOLUTION RESPIRATORY (INHALATION) at 20:25

## 2025-06-29 RX ADMIN — LEVOTHYROXINE SODIUM 150 MCG: 112 TABLET ORAL at 06:52

## 2025-06-29 RX ADMIN — SODIUM CHLORIDE, PRESERVATIVE FREE 10 ML: 5 INJECTION INTRAVENOUS at 09:14

## 2025-06-29 RX ADMIN — MEMANTINE 10 MG: 10 TABLET ORAL at 09:14

## 2025-06-29 RX ADMIN — PIPERACILLIN AND TAZOBACTAM 3375 MG: 3; .375 INJECTION, POWDER, LYOPHILIZED, FOR SOLUTION INTRAVENOUS at 19:31

## 2025-06-29 RX ADMIN — PIPERACILLIN AND TAZOBACTAM 3375 MG: 3; .375 INJECTION, POWDER, LYOPHILIZED, FOR SOLUTION INTRAVENOUS at 02:38

## 2025-06-29 RX ADMIN — MEMANTINE 10 MG: 10 TABLET ORAL at 19:52

## 2025-06-29 RX ADMIN — ENOXAPARIN SODIUM 40 MG: 100 INJECTION SUBCUTANEOUS at 09:14

## 2025-06-29 RX ADMIN — INSULIN LISPRO 3 UNITS: 100 INJECTION, SOLUTION INTRAVENOUS; SUBCUTANEOUS at 21:22

## 2025-06-29 RX ADMIN — ROSUVASTATIN CALCIUM 10 MG: 10 TABLET, FILM COATED ORAL at 19:52

## 2025-06-29 RX ADMIN — SODIUM CHLORIDE, PRESERVATIVE FREE 10 ML: 5 INJECTION INTRAVENOUS at 19:52

## 2025-06-29 RX ADMIN — LEVOFLOXACIN 750 MG: 5 INJECTION, SOLUTION INTRAVENOUS at 17:41

## 2025-06-29 RX ADMIN — PIPERACILLIN AND TAZOBACTAM 3375 MG: 3; .375 INJECTION, POWDER, LYOPHILIZED, FOR SOLUTION INTRAVENOUS at 09:25

## 2025-06-29 RX ADMIN — INSULIN LISPRO 2 UNITS: 100 INJECTION, SOLUTION INTRAVENOUS; SUBCUTANEOUS at 16:48

## 2025-06-29 ASSESSMENT — PAIN SCALES - WONG BAKER: WONGBAKER_NUMERICALRESPONSE: NO HURT

## 2025-06-29 ASSESSMENT — PAIN SCALES - GENERAL
PAINLEVEL_OUTOF10: 0

## 2025-06-30 VITALS
WEIGHT: 148 LBS | TEMPERATURE: 97.7 F | DIASTOLIC BLOOD PRESSURE: 59 MMHG | OXYGEN SATURATION: 98 % | HEART RATE: 70 BPM | SYSTOLIC BLOOD PRESSURE: 140 MMHG | RESPIRATION RATE: 19 BRPM | BODY MASS INDEX: 27.94 KG/M2 | HEIGHT: 61 IN

## 2025-06-30 LAB
BASOPHILS # BLD: 0.01 K/UL (ref 0–0.1)
BASOPHILS NFR BLD: 0.1 % (ref 0–2)
DIFFERENTIAL METHOD BLD: ABNORMAL
EOSINOPHIL # BLD: 0 K/UL (ref 0–0.4)
EOSINOPHIL NFR BLD: 0 % (ref 0–5)
ERYTHROCYTE [DISTWIDTH] IN BLOOD BY AUTOMATED COUNT: 14.8 % (ref 11.6–14.5)
GLUCOSE BLD STRIP.AUTO-MCNC: 179 MG/DL (ref 70–110)
GLUCOSE BLD STRIP.AUTO-MCNC: 180 MG/DL (ref 70–110)
GLUCOSE BLD STRIP.AUTO-MCNC: 261 MG/DL (ref 70–110)
HCT VFR BLD AUTO: 35.4 % (ref 35–45)
HGB BLD-MCNC: 10.8 G/DL (ref 12–16)
IMM GRANULOCYTES # BLD AUTO: 0.05 K/UL (ref 0–0.04)
IMM GRANULOCYTES NFR BLD AUTO: 0.7 % (ref 0–0.5)
LYMPHOCYTES # BLD: 1.48 K/UL (ref 0.9–3.6)
LYMPHOCYTES NFR BLD: 20.5 % (ref 21–52)
MCH RBC QN AUTO: 26 PG (ref 24–34)
MCHC RBC AUTO-ENTMCNC: 30.5 G/DL (ref 31–37)
MCV RBC AUTO: 85.1 FL (ref 78–100)
MONOCYTES # BLD: 0.2 K/UL (ref 0.05–1.2)
MONOCYTES NFR BLD: 2.8 % (ref 3–10)
NEUTS SEG # BLD: 5.49 K/UL (ref 1.8–8)
NEUTS SEG NFR BLD: 75.9 % (ref 40–73)
NRBC # BLD: 0 K/UL (ref 0–0.01)
NRBC BLD-RTO: 0 PER 100 WBC
PLATELET # BLD AUTO: 192 K/UL (ref 135–420)
PMV BLD AUTO: 10 FL (ref 9.2–11.8)
RBC # BLD AUTO: 4.16 M/UL (ref 4.2–5.3)
WBC # BLD AUTO: 7.2 K/UL (ref 4.6–13.2)

## 2025-06-30 PROCEDURE — 2700000000 HC OXYGEN THERAPY PER DAY

## 2025-06-30 PROCEDURE — 2580000003 HC RX 258: Performed by: STUDENT IN AN ORGANIZED HEALTH CARE EDUCATION/TRAINING PROGRAM

## 2025-06-30 PROCEDURE — 92526 ORAL FUNCTION THERAPY: CPT

## 2025-06-30 PROCEDURE — 6370000000 HC RX 637 (ALT 250 FOR IP): Performed by: INTERNAL MEDICINE

## 2025-06-30 PROCEDURE — 82962 GLUCOSE BLOOD TEST: CPT

## 2025-06-30 PROCEDURE — 36415 COLL VENOUS BLD VENIPUNCTURE: CPT

## 2025-06-30 PROCEDURE — 6360000002 HC RX W HCPCS: Performed by: STUDENT IN AN ORGANIZED HEALTH CARE EDUCATION/TRAINING PROGRAM

## 2025-06-30 PROCEDURE — 6370000000 HC RX 637 (ALT 250 FOR IP): Performed by: STUDENT IN AN ORGANIZED HEALTH CARE EDUCATION/TRAINING PROGRAM

## 2025-06-30 PROCEDURE — 99239 HOSP IP/OBS DSCHRG MGMT >30: CPT | Performed by: STUDENT IN AN ORGANIZED HEALTH CARE EDUCATION/TRAINING PROGRAM

## 2025-06-30 PROCEDURE — 94761 N-INVAS EAR/PLS OXIMETRY MLT: CPT

## 2025-06-30 PROCEDURE — 85025 COMPLETE CBC W/AUTO DIFF WBC: CPT

## 2025-06-30 RX ORDER — LORAZEPAM 2 MG/ML
.5-1 CONCENTRATE ORAL EVERY 6 HOURS PRN
Qty: 30 ML | Refills: 0
Start: 2025-06-30 | End: 2025-07-14

## 2025-06-30 RX ORDER — PREDNISONE 20 MG/1
40 TABLET ORAL DAILY
Qty: 20 TABLET | Refills: 0
Start: 2025-07-01 | End: 2025-07-11

## 2025-06-30 RX ORDER — HYOSCYAMINE SULFATE 0.12 MG/1
0.12 TABLET SUBLINGUAL EVERY 6 HOURS PRN
Qty: 10 TABLET | Refills: 0
Start: 2025-06-30

## 2025-06-30 RX ORDER — BISACODYL 10 MG
10 SUPPOSITORY, RECTAL RECTAL DAILY PRN
Qty: 5 SUPPOSITORY | Refills: 0
Start: 2025-06-30 | End: 2025-07-30

## 2025-06-30 RX ORDER — MORPHINE SULFATE 100 MG/5ML
5-20 SOLUTION ORAL
Qty: 30 ML | Refills: 0
Start: 2025-06-30 | End: 2025-07-04

## 2025-06-30 RX ORDER — ACETAMINOPHEN 650 MG/1
650 SUPPOSITORY RECTAL EVERY 4 HOURS PRN
Qty: 4 SUPPOSITORY | Refills: 0
Start: 2025-06-30

## 2025-06-30 RX ORDER — LEVOFLOXACIN 750 MG/1
750 TABLET, FILM COATED ORAL EVERY 24 HOURS
Qty: 3 TABLET | Refills: 0
Start: 2025-06-30 | End: 2025-07-03

## 2025-06-30 RX ORDER — LEVOFLOXACIN 750 MG/1
750 TABLET, FILM COATED ORAL EVERY 24 HOURS
Status: DISCONTINUED | OUTPATIENT
Start: 2025-06-30 | End: 2025-06-30 | Stop reason: HOSPADM

## 2025-06-30 RX ADMIN — ENOXAPARIN SODIUM 40 MG: 100 INJECTION SUBCUTANEOUS at 09:19

## 2025-06-30 RX ADMIN — PREDNISONE 40 MG: 20 TABLET ORAL at 09:01

## 2025-06-30 RX ADMIN — LEVOFLOXACIN 750 MG: 750 TABLET, FILM COATED ORAL at 18:00

## 2025-06-30 RX ADMIN — MEMANTINE 10 MG: 10 TABLET ORAL at 09:01

## 2025-06-30 RX ADMIN — ARFORMOTEROL TARTRATE: 15 SOLUTION RESPIRATORY (INHALATION) at 11:05

## 2025-06-30 RX ADMIN — FERROUS SULFATE TAB 325 MG (65 MG ELEMENTAL FE) 325 MG: 325 (65 FE) TAB at 09:01

## 2025-06-30 RX ADMIN — PANTOPRAZOLE SODIUM 40 MG: 40 TABLET, DELAYED RELEASE ORAL at 05:57

## 2025-06-30 RX ADMIN — THERA TABS 1 TABLET: TAB at 09:01

## 2025-06-30 RX ADMIN — INSULIN LISPRO 2 UNITS: 100 INJECTION, SOLUTION INTRAVENOUS; SUBCUTANEOUS at 16:49

## 2025-06-30 RX ADMIN — AMOXICILLIN AND CLAVULANATE POTASSIUM 1 TABLET: 875; 125 TABLET, FILM COATED ORAL at 09:01

## 2025-06-30 RX ADMIN — LEVOTHYROXINE SODIUM 150 MCG: 112 TABLET ORAL at 05:57

## 2025-06-30 RX ADMIN — PIPERACILLIN AND TAZOBACTAM 3375 MG: 3; .375 INJECTION, POWDER, LYOPHILIZED, FOR SOLUTION INTRAVENOUS at 02:17

## 2025-06-30 ASSESSMENT — PAIN SCALES - GENERAL
PAINLEVEL_OUTOF10: 0
PAINLEVEL_OUTOF10: 0

## 2025-06-30 ASSESSMENT — PAIN SCALES - WONG BAKER
WONGBAKER_NUMERICALRESPONSE: NO HURT

## 2025-06-30 NOTE — CARE COORDINATION
Called [facility] at [phone] scheduled 2:00 pm transport to [facility] ([address] [place] [id number]) with Jeanette.  Informed CM transportation was scheduled.

## 2025-06-30 NOTE — CARE COORDINATION
MSW printed POST form for patient DNR. MSW called Elias Scherer [phone]  Marissa and confirmed that patient CAN transport with the POST form as DNR due to the top section that confirms DO NOT ATTEMPT TO RESUSCITATE.    [facility] faxed over clinicals to ANN[facility] and received the completed fax transmission form. Facility confirmed return and DC summary.     PCS form in chart.     JESSICA Stevens   Inpatient Case Management (ICM)   [facility]

## 2025-06-30 NOTE — PLAN OF CARE
Problem: Chronic Conditions and Co-morbidities  Goal: Patient's chronic conditions and co-morbidity symptoms are monitored and maintained or improved  6/24/2025 0837 by Ct Monaco RN  Outcome: Progressing  Flowsheets (Taken 6/24/2025 0800)  Care Plan - Patient's Chronic Conditions and Co-Morbidity Symptoms are Monitored and Maintained or Improved:   Monitor and assess patient's chronic conditions and comorbid symptoms for stability, deterioration, or improvement   Collaborate with multidisciplinary team to address chronic and comorbid conditions and prevent exacerbation or deterioration   Update acute care plan with appropriate goals if chronic or comorbid symptoms are exacerbated and prevent overall improvement and discharge  6/24/2025 0424 by Hand, BROOKLYNN Reeves  Outcome: Progressing  Flowsheets (Taken 6/23/2025 2330)  Care Plan - Patient's Chronic Conditions and Co-Morbidity Symptoms are Monitored and Maintained or Improved:   Monitor and assess patient's chronic conditions and comorbid symptoms for stability, deterioration, or improvement   Collaborate with multidisciplinary team to address chronic and comorbid conditions and prevent exacerbation or deterioration   Update acute care plan with appropriate goals if chronic or comorbid symptoms are exacerbated and prevent overall improvement and discharge     Problem: Discharge Planning  Goal: Discharge to home or other facility with appropriate resources  6/24/2025 0837 by Ct Monaco RN  Outcome: Progressing  Flowsheets (Taken 6/24/2025 0800)  Discharge to home or other facility with appropriate resources:   Arrange for needed discharge resources and transportation as appropriate   Identify barriers to discharge with patient and caregiver   Identify discharge learning needs (meds, wound care, etc)  6/24/2025 0424 by Hand, BROOKLYNN Reeves  Outcome: Progressing  Flowsheets (Taken 6/23/2025 2330)  Discharge to home or other facility with appropriate 
  Problem: Chronic Conditions and Co-morbidities  Goal: Patient's chronic conditions and co-morbidity symptoms are monitored and maintained or improved  6/28/2025 1901 by Rasheeda Carrizales RN  Outcome: Progressing  Flowsheets (Taken 6/28/2025 1345)  Care Plan - Patient's Chronic Conditions and Co-Morbidity Symptoms are Monitored and Maintained or Improved: Monitor and assess patient's chronic conditions and comorbid symptoms for stability, deterioration, or improvement  6/28/2025 0959 by Liborio Zarate RN  Outcome: Progressing  Flowsheets (Taken 6/28/2025 0800)  Care Plan - Patient's Chronic Conditions and Co-Morbidity Symptoms are Monitored and Maintained or Improved:   Monitor and assess patient's chronic conditions and comorbid symptoms for stability, deterioration, or improvement   Update acute care plan with appropriate goals if chronic or comorbid symptoms are exacerbated and prevent overall improvement and discharge   Collaborate with multidisciplinary team to address chronic and comorbid conditions and prevent exacerbation or deterioration     Problem: Discharge Planning  Goal: Discharge to home or other facility with appropriate resources  6/28/2025 1901 by Rasheeda Carrizales RN  Outcome: Progressing  Flowsheets (Taken 6/28/2025 1345)  Discharge to home or other facility with appropriate resources:   Identify barriers to discharge with patient and caregiver   Arrange for needed discharge resources and transportation as appropriate  6/28/2025 0959 by Liborio Zarate RN  Outcome: Progressing  Flowsheets (Taken 6/28/2025 0800)  Discharge to home or other facility with appropriate resources:   Identify barriers to discharge with patient and caregiver   Identify discharge learning needs (meds, wound care, etc)   Refer to discharge planning if patient needs post-hospital services based on physician order or complex needs related to functional status, cognitive ability or social support system     Problem: 
  Problem: Chronic Conditions and Co-morbidities  Goal: Patient's chronic conditions and co-morbidity symptoms are monitored and maintained or improved  6/29/2025 1032 by Nadine Knutson RN  Flowsheets (Taken 6/29/2025 1031)  Care Plan - Patient's Chronic Conditions and Co-Morbidity Symptoms are Monitored and Maintained or Improved:   Monitor and assess patient's chronic conditions and comorbid symptoms for stability, deterioration, or improvement   Collaborate with multidisciplinary team to address chronic and comorbid conditions and prevent exacerbation or deterioration   Update acute care plan with appropriate goals if chronic or comorbid symptoms are exacerbated and prevent overall improvement and discharge  6/29/2025 1031 by Nadine Knutson RN  Flowsheets (Taken 6/29/2025 1031)  Care Plan - Patient's Chronic Conditions and Co-Morbidity Symptoms are Monitored and Maintained or Improved:   Monitor and assess patient's chronic conditions and comorbid symptoms for stability, deterioration, or improvement   Collaborate with multidisciplinary team to address chronic and comorbid conditions and prevent exacerbation or deterioration   Update acute care plan with appropriate goals if chronic or comorbid symptoms are exacerbated and prevent overall improvement and discharge     Problem: Discharge Planning  Goal: Discharge to home or other facility with appropriate resources  Outcome: Progressing  Flowsheets (Taken 6/28/2025 1950 by Siena Lebron RN)  Discharge to home or other facility with appropriate resources:   Identify barriers to discharge with patient and caregiver   Arrange for needed discharge resources and transportation as appropriate   Identify discharge learning needs (meds, wound care, etc)   Refer to discharge planning if patient needs post-hospital services based on physician order or complex needs related to functional status, cognitive ability or social support system     Problem: Pain  Goal: 
  Problem: Chronic Conditions and Co-morbidities  Goal: Patient's chronic conditions and co-morbidity symptoms are monitored and maintained or improved  Outcome: Progressing     Problem: Discharge Planning  Goal: Discharge to home or other facility with appropriate resources  Outcome: Progressing     Problem: Pain  Goal: Verbalizes/displays adequate comfort level or baseline comfort level  Outcome: Progressing     Problem: Safety - Adult  Goal: Free from fall injury  Outcome: Progressing     Problem: Skin/Tissue Integrity  Goal: Skin integrity remains intact  Description: 1.  Monitor for areas of redness and/or skin breakdown  2.  Assess vascular access sites hourly  3.  Every 4-6 hours minimum:  Change oxygen saturation probe site  4.  Every 4-6 hours:  If on nasal continuous positive airway pressure, respiratory therapy assess nares and determine need for appliance change or resting period  Outcome: Progressing     Problem: SLP Adult - Impaired Swallowing  Goal: By Discharge: Advance to least restrictive diet without signs or symptoms of aspiration for planned discharge setting.  See evaluation for individualized goals.  Description: Patient will:  1. Tolerate PO trials with 0 s/s overt distress in 4/5 trials  2. Utilize compensatory swallow strategies/maneuvers (decrease bite/sip, size/rate, alt. liq/sol) with min cues in 4/5 trials  3. Perform oral-motor/laryngeal exercises to increase oropharyngeal swallow function with min cues  4. Complete an objective swallow study (i.e., MBSS) to assess swallow integrity, r/o aspiration, and determine of safest LRD, min A as indicated/ordered by MD     Recommend:   NPO   Consideration of alternate means of nutrition/hydration vs comfort feeds  Strict aspiration precautions (HOB >30 degrees at all times, Oral care TID)      6/25/2025 1323 by Nelile Tillman, SLP  Outcome: Progressing     
  Problem: Chronic Conditions and Co-morbidities  Goal: Patient's chronic conditions and co-morbidity symptoms are monitored and maintained or improved  Outcome: Progressing  Flowsheets  Taken 6/27/2025 1343 by Beverley Melchor RN  Care Plan - Patient's Chronic Conditions and Co-Morbidity Symptoms are Monitored and Maintained or Improved: Monitor and assess patient's chronic conditions and comorbid symptoms for stability, deterioration, or improvement  Taken 6/27/2025 0800 by Liborio Zarate RN  Care Plan - Patient's Chronic Conditions and Co-Morbidity Symptoms are Monitored and Maintained or Improved:   Monitor and assess patient's chronic conditions and comorbid symptoms for stability, deterioration, or improvement   Collaborate with multidisciplinary team to address chronic and comorbid conditions and prevent exacerbation or deterioration   Update acute care plan with appropriate goals if chronic or comorbid symptoms are exacerbated and prevent overall improvement and discharge  Note: Identify barriers to discharge       Problem: Discharge Planning  Goal: Discharge to home or other facility with appropriate resources  Outcome: Progressing  Flowsheets  Taken 6/27/2025 1343 by Beverley Melchor RN  Discharge to home or other facility with appropriate resources: Identify barriers to discharge with patient and caregiver  Taken 6/27/2025 0800 by Liborio Zarate RN  Discharge to home or other facility with appropriate resources:   Identify barriers to discharge with patient and caregiver   Identify discharge learning needs (meds, wound care, etc)   Refer to discharge planning if patient needs post-hospital services based on physician order or complex needs related to functional status, cognitive ability or social support system  Note: Identify barriers to discharge        Problem: Pain  Goal: Verbalizes/displays adequate comfort level or baseline comfort level  Outcome: Progressing  Flowsheets  Taken 6/27/2025 1343 by Ruiz 
  Problem: Chronic Conditions and Co-morbidities  Goal: Patient's chronic conditions and co-morbidity symptoms are monitored and maintained or improved  Outcome: Progressing  Flowsheets (Taken 6/23/2025 2330)  Care Plan - Patient's Chronic Conditions and Co-Morbidity Symptoms are Monitored and Maintained or Improved:   Monitor and assess patient's chronic conditions and comorbid symptoms for stability, deterioration, or improvement   Collaborate with multidisciplinary team to address chronic and comorbid conditions and prevent exacerbation or deterioration   Update acute care plan with appropriate goals if chronic or comorbid symptoms are exacerbated and prevent overall improvement and discharge     Problem: Discharge Planning  Goal: Discharge to home or other facility with appropriate resources  Outcome: Progressing  Flowsheets (Taken 6/23/2025 2330)  Discharge to home or other facility with appropriate resources:   Identify barriers to discharge with patient and caregiver   Arrange for needed discharge resources and transportation as appropriate   Identify discharge learning needs (meds, wound care, etc)   Refer to discharge planning if patient needs post-hospital services based on physician order or complex needs related to functional status, cognitive ability or social support system   Arrange for interpreters to assist at discharge as needed     Problem: Pain  Goal: Verbalizes/displays adequate comfort level or baseline comfort level  Outcome: Progressing     Problem: Safety - Adult  Goal: Free from fall injury  Outcome: Progressing     
  Problem: Chronic Conditions and Co-morbidities  Goal: Patient's chronic conditions and co-morbidity symptoms are monitored and maintained or improved  Outcome: Progressing  Flowsheets (Taken 6/24/2025 2000)  Care Plan - Patient's Chronic Conditions and Co-Morbidity Symptoms are Monitored and Maintained or Improved: Monitor and assess patient's chronic conditions and comorbid symptoms for stability, deterioration, or improvement     Problem: Discharge Planning  Goal: Discharge to home or other facility with appropriate resources  Outcome: Not Progressing  Flowsheets (Taken 6/24/2025 2000)  Discharge to home or other facility with appropriate resources: Identify barriers to discharge with patient and caregiver     Problem: Pain  Goal: Verbalizes/displays adequate comfort level or baseline comfort level  Outcome: Progressing     Problem: Safety - Adult  Goal: Free from fall injury  Outcome: Progressing     Problem: Skin/Tissue Integrity  Goal: Skin integrity remains intact  Description: 1.  Monitor for areas of redness and/or skin breakdown  2.  Assess vascular access sites hourly  3.  Every 4-6 hours minimum:  Change oxygen saturation probe site  4.  Every 4-6 hours:  If on nasal continuous positive airway pressure, respiratory therapy assess nares and determine need for appliance change or resting period  Outcome: Not Progressing  Flowsheets (Taken 6/24/2025 2000)  Skin Integrity Remains Intact: Monitor for areas of redness and/or skin breakdown     Problem: SLP Adult - Impaired Swallowing  Goal: By Discharge: Advance to least restrictive diet without signs or symptoms of aspiration for planned discharge setting.  See evaluation for individualized goals.  Description: Patient will:  1. Tolerate PO trials with 0 s/s overt distress in 4/5 trials  2. Utilize compensatory swallow strategies/maneuvers (decrease bite/sip, size/rate, alt. liq/sol) with min cues in 4/5 trials  3. Perform oral-motor/laryngeal exercises to 
  Problem: Chronic Conditions and Co-morbidities  Goal: Patient's chronic conditions and co-morbidity symptoms are monitored and maintained or improved  Outcome: Progressing  Flowsheets (Taken 6/28/2025 0800)  Care Plan - Patient's Chronic Conditions and Co-Morbidity Symptoms are Monitored and Maintained or Improved:   Monitor and assess patient's chronic conditions and comorbid symptoms for stability, deterioration, or improvement   Update acute care plan with appropriate goals if chronic or comorbid symptoms are exacerbated and prevent overall improvement and discharge   Collaborate with multidisciplinary team to address chronic and comorbid conditions and prevent exacerbation or deterioration     Problem: Discharge Planning  Goal: Discharge to home or other facility with appropriate resources  Outcome: Progressing  Flowsheets (Taken 6/28/2025 0800)  Discharge to home or other facility with appropriate resources:   Identify barriers to discharge with patient and caregiver   Identify discharge learning needs (meds, wound care, etc)   Refer to discharge planning if patient needs post-hospital services based on physician order or complex needs related to functional status, cognitive ability or social support system     Problem: Pain  Goal: Verbalizes/displays adequate comfort level or baseline comfort level  Outcome: Progressing  Flowsheets  Taken 6/28/2025 0800 by Liborio Zarate RN  Verbalizes/displays adequate comfort level or baseline comfort level:   Assess pain using appropriate pain scale   Administer analgesics based on type and severity of pain and evaluate response  Taken 6/27/2025 2000 by Symone Ricardo GN  Verbalizes/displays adequate comfort level or baseline comfort level:   Encourage patient to monitor pain and request assistance   Assess pain using appropriate pain scale     Problem: Safety - Adult  Goal: Free from fall injury  Outcome: Progressing  Flowsheets (Taken 6/27/2025 1343 by Beverley Melchor 
  Problem: Chronic Conditions and Co-morbidities  Goal: Patient's chronic conditions and co-morbidity symptoms are monitored and maintained or improved  Outcome: Progressing  Flowsheets (Taken 6/30/2025 0813)  Care Plan - Patient's Chronic Conditions and Co-Morbidity Symptoms are Monitored and Maintained or Improved:   Monitor and assess patient's chronic conditions and comorbid symptoms for stability, deterioration, or improvement   Collaborate with multidisciplinary team to address chronic and comorbid conditions and prevent exacerbation or deterioration   Update acute care plan with appropriate goals if chronic or comorbid symptoms are exacerbated and prevent overall improvement and discharge     Problem: Discharge Planning  Goal: Discharge to home or other facility with appropriate resources  Outcome: Progressing  Flowsheets (Taken 6/30/2025 0813)  Discharge to home or other facility with appropriate resources:   Identify barriers to discharge with patient and caregiver   Arrange for needed discharge resources and transportation as appropriate   Identify discharge learning needs (meds, wound care, etc)     Problem: Nutrition Deficit:  Goal: Optimize nutritional status  Outcome: Progressing  Flowsheets (Taken 6/30/2025 1406)  Nutrient intake appropriate for improving, restoring, or maintaining nutritional needs: Monitor oral intake, labs, and treatment plans  Note: Pt eating 100% of meals     Problem: Neurosensory - Adult  Goal: Achieves stable or improved neurological status  Outcome: Progressing  Flowsheets (Taken 6/30/2025 1406)  Achieves stable or improved neurological status:   Assess for and report changes in neurological status   Initiate measures to prevent increased intracranial pressure     Problem: Respiratory - Adult  Goal: Achieves optimal ventilation and oxygenation  Outcome: Progressing  Flowsheets (Taken 6/30/2025 1406)  Achieves optimal ventilation and oxygenation:   Assess for changes in 
  Problem: SLP Adult - Impaired Swallowing  Goal: By Discharge: Advance to least restrictive diet without signs or symptoms of aspiration for planned discharge setting.  See evaluation for individualized goals.  Description:     Patient will:  1. Tolerate PO trials with 0 s/s overt distress in 4/5 trials  2. Utilize compensatory swallow strategies/maneuvers (decrease bite/sip, size/rate, alt. liq/sol) with min cues in 4/5 trials  3. Perform oral-motor/laryngeal exercises to increase oropharyngeal swallow function with min cues  4. Complete an objective swallow study (i.e., MBSS) to assess swallow integrity, r/o aspiration, and determine of safest LRD, min A as indicated/ordered by MD     Rec:     Puree diet with honey-thick liquids when alert and accepting to PO only  Aspiration precautions  HOB >45 during po intake, remain >30 for 30-45 minutes after po   Small bites/sips; alternate liquid/solid with slow feeding rate   Oral care TID  Meds crushed  Outcome: Progressing   SPEECH LANGUAGE PATHOLOGY DYSPHAGIA TREATMENT    Patient: No Alberts (89 y.o. female)  Date: 6/27/2025  Diagnosis: Pneumonia due to human metapneumovirus [J12.3]  Acute respiratory failure with hypoxia and hypercarbia (HCC) [J96.01, J96.02] Pneumonia due to human metapneumovirus      Precautions: Standard, aspiration  PLOF: As per H&P      ASSESSMENT:  Pt was seen for follow up dysphagia management. She was lethargic this date and required sternal rub to alert for PO. Pt with delayed weak/congested cough s/p 1/5 small tsp honey-thick liquid trials. Delayed cough also noted at baseline. Pt with poor endurance during PO trials. Recommend to feed pt when alert and accepting to PO only.  Recommend diet upgrade to puree with honey-thick liquids, aspiration precautions, oral care TID, and meds crushed. Rec assistance with all PO. Per chart review: she is to DC home with hospice. Will continue to follow for further dysphagia management. 
  Problem: SLP Adult - Impaired Swallowing  Goal: By Discharge: Advance to least restrictive diet without signs or symptoms of aspiration for planned discharge setting.  See evaluation for individualized goals.  Description:   Patient will:  1. Tolerate PO trials with 0 s/s overt distress in 4/5 trials  2. Utilize compensatory swallow strategies/maneuvers (decrease bite/sip, size/rate, alt. liq/sol) with min cues in 4/5 trials  3. Perform oral-motor/laryngeal exercises to increase oropharyngeal swallow function with min cues  4. Complete an objective swallow study (i.e., MBSS) to assess swallow integrity, r/o aspiration, and determine of safest LRD, min A as indicated/ordered by MD     Rec:     Puree diet with honey-thick liquids  Aspiration precautions  HOB >45 during po intake, remain >30 for 30-45 minutes after po   Small bites/sips; alternate liquid/solid with slow feeding rate   Oral care TID  Meds crushed        Outcome: Progressing   SPEECH LANGUAGE PATHOLOGY DYSPHAGIA TREATMENT    Patient: No Alberts (89 y.o. female)  Date: 6/26/2025  Diagnosis: Pneumonia due to human metapneumovirus [J12.3]  Acute respiratory failure with hypoxia and hypercarbia (HCC) [J96.01, J96.02] Pneumonia due to human metapneumovirus      Precautions: Standard, aspiration  PLOF: As per H&P      ASSESSMENT:  Pt was seen at bedside for follow up dysphagia management. Pt on 20L 30% FiO2 HFNC. She tolerated puree and honey-thick liquids without any overt s/sx of aspiration. Increase in RR, altered VQ and throat clear s/p nectar-thick. Laryngeal elevation was weak/decreased to palpation. Pt with poor endurance during PO trials. Recommend diet upgrade to puree with honey-thick liquids, aspiration precautions, oral care TID, and meds crushed. Rec assistance with all PO. Per chart review: she is to DC home with hospice. Will continue to follow for further dysphagia management.     Progression toward goals:  []         Improving 
  Problem: Skin/Tissue Integrity  Goal: Skin integrity remains intact  Description: 1.  Monitor for areas of redness and/or skin breakdown  2.  Assess vascular access sites hourly  3.  Every 4-6 hours minimum:  Change oxygen saturation probe site  4.  Every 4-6 hours:  If on nasal continuous positive airway pressure, respiratory therapy assess nares and determine need for appliance change or resting period  6/29/2025 2307 by Siena Lebron RN  Outcome: Not Progressing  Flowsheets (Taken 6/29/2025 1950)  Skin Integrity Remains Intact:   Monitor for areas of redness and/or skin breakdown   Assess vascular access sites hourly   Every 4-6 hours minimum:  Change oxygen saturation probe site   Turn and reposition as indicated   Assess need for specialty bed   Positioning devices   Pressure redistribution bed/mattress (bed type)   Check visual cues for pain   Monitor skin under medical devices  Note: Extremely fragile skin  6/29/2025 1032 by Nadine Knutson RN  Outcome: Progressing  Flowsheets (Taken 6/28/2025 1950 by Siena Lebron RN)  Skin Integrity Remains Intact:   Monitor for areas of redness and/or skin breakdown   Assess vascular access sites hourly   Every 4-6 hours minimum:  Change oxygen saturation probe site   Turn and reposition as indicated   Assess need for specialty bed   Positioning devices   Pressure redistribution bed/mattress (bed type)   Check visual cues for pain   Monitor skin under medical devices     Problem: Nutrition Deficit:  Goal: Optimize nutritional status  6/29/2025 2307 by Siena Lebron RN  Outcome: Not Progressing  Flowsheets (Taken 6/29/2025 1032 by Nadine Knutson, RN)  Nutrient intake appropriate for improving, restoring, or maintaining nutritional needs: Monitor oral intake, labs, and treatment plans  Note: Poor appetite  6/29/2025 1032 by Nadine Knutson, RN  Outcome: Progressing  Flowsheets (Taken 6/29/2025 1032)  Nutrient intake appropriate for improving, restoring, or 
Problem: SLP Adult - Impaired Swallowing  Goal: By Discharge: Advance to least restrictive diet without signs or symptoms of aspiration for planned discharge setting.  See evaluation for individualized goals.  Description:   Patient will:  1. Tolerate PO trials with 0 s/s overt distress in 4/5 trials  2. Utilize compensatory swallow strategies/maneuvers (decrease bite/sip, size/rate, alt. liq/sol) with min cues in 4/5 trials  3. Perform oral-motor/laryngeal exercises to increase oropharyngeal swallow function with min cues  4. Complete an objective swallow study (i.e., MBSS) to assess swallow integrity, r/o aspiration, and determine of safest LRD, min A as indicated/ordered by MD     Rec:     Puree diet with honey-thick liquids when alert and accepting to PO only  Aspiration precautions  HOB >45 during po intake, remain >30 for 30-45 minutes after po   Small bites/sips; alternate liquid/solid with slow feeding rate   Oral care TID  Meds crushed    Outcome: Progressing  SPEECH LANGUAGE PATHOLOGY DYSPHAGIA TREATMENT    Patient: No Alberts (89 y.o. female)  Date: 6/30/2025  Diagnosis: Pneumonia due to human metapneumovirus [J12.3]  Acute respiratory failure with hypoxia and hypercarbia (HCC) [J96.01, J96.02] Pneumonia due to human metapneumovirus      Precautions: Standard, aspiration  PLOF: As per H&P      ASSESSMENT:  Pt seen for follow up dysphagia management. Pt with congest cough at baseline prior to PO. Pt tolerated crushed pills in puree and honey thick liquids via tsp without overt s/s of aspiration. Weak/decreased laryngeal elevation to palpation with PO. Pt with poor endurance during PO trials. Recommend assistance with all meals and only feed Pt when alert alert. Recommend to continue puree with honey-thick liquids, aspiration precautions, oral care TID, and meds crushed. Per chart review: she is to DC home with hospice. Will continue to follow for further dysphagia management.     Progression toward 
Problem: SLP Adult - Impaired Swallowing  Goal: By Discharge: Advance to least restrictive diet without signs or symptoms of aspiration for planned discharge setting.  See evaluation for individualized goals.  Description: Patient will:  1. Tolerate PO trials with 0 s/s overt distress in 4/5 trials  2. Utilize compensatory swallow strategies/maneuvers (decrease bite/sip, size/rate, alt. liq/sol) with min cues in 4/5 trials  3. Perform oral-motor/laryngeal exercises to increase oropharyngeal swallow function with min cues  4. Complete an objective swallow study (i.e., MBSS) to assess swallow integrity, r/o aspiration, and determine of safest LRD, min A as indicated/ordered by MD     Recommend:   NPO   Consideration of alternate means of nutrition/hydration vs comfort feeds  Strict aspiration precautions (HOB >30 degrees at all times, Oral care TID)    Outcome: Progressing  SPEECH LANGUAGE PATHOLOGY DYSPHAGIA TREATMENT    Patient: No Alberts (89 y.o. female)  Date: 6/25/2025  Diagnosis: Pneumonia due to human metapneumovirus [J12.3]  Acute respiratory failure with hypoxia and hypercarbia (HCC) [J96.01, J96.02] Pneumonia due to human metapneumovirus      Precautions: Standard, aspiration  PLOF: As per H&P      ASSESSMENT:  Pt seen for follow up dysphagia management. Pt now on 35L 50% FiO2 HFNC with improved mentation compared to prior session. Pt continues to display congested/weak cough and increase in RR s/p ice chips x2 and honey thick liquids via tsp x2. Extremely weak/decreased laryngeal elevation to palpation with all PO. Pt continues to demo inadequate sensory/motor awareness/strength for safe and adequate nutritional intake. Recommend NPO with consideration of alternate means of nutrition/hydration vs comfort feeds, aspiration precautions, oral care TID, and meds via alternative means. Pt will benefit from MBS once oxygen requirements reduce. Will continue to follow.     Progression toward goals:  []      
initiated; Educated patient on risks of aspiration with PO and NPO recommendations at this time; no evidence of learning due to mentation. D/w RN and palliative care team that reports Pt may be going comfort after thy meet with Pt's daughter.     Patient will benefit from skilled intervention to address the above impairments.  Patient's rehabilitation potential poor .  Factors which may influence rehabilitation potential include:   []            None noted  [x]            Mental ability/status  [x]            Medical condition  []            Home/family situation and support systems  []            Safety awareness  []            Pain tolerance/management  []            Other:      PLAN :  Recommendations and Planned Interventions:  Recommendations  Requires SLP Intervention: Yes  Recommendations: NPO  D/C Recommendations: Ongoing speech therapy is recommended during this hospitalization  Diet Solids Recommendation: NPO  Liquid Consistency Recommendation: NPO  Recommended Form of Meds: Via alternative means of nutrition  Therapeutic Interventions: Therapeutic PO trials with SLP;Patient/Family education;Oral care  Patient Education: Pt educated on risk of aspiration with PO, however suspect limited carryover due to mentation  Patient Education Response: No evidence of learning    Frequency/Duration: Patient will be followed by speech-language pathology 1-2 times per day/3-5 days per week to address goals.    Discharge Recommendations: D/C Recommendations: Ongoing speech therapy is recommended during this hospitalization     SUBJECTIVE:   Patient stated, \"I want to go home.\"    OBJECTIVE:     Past Medical History:   Diagnosis Date    Alzheimer's dementia (HCC)     Arthritis     osteo    Diabetes (HCC)     GERD (gastroesophageal reflux disease)     Heart failure (HCC)     history of    Hypercholesteremia     Hypertension     Ill-defined condition     Kidney stones     Morbid obesity (HCC)     Psychiatric disorder     
reposition as indicated  6/25/2025 1842 by Irene Cedillo, RN  Outcome: Progressing

## 2025-06-30 NOTE — CARE COORDINATION
Received a call from Radha at [facility] advising that patient will now need an ISO room and that her room will not be ready ntil 1600.     MSW Called [facility] at [phone] and rescheduled 2:00 pm transport to [facility] ([address] [place] [address])  to 1700.    Awaiting DC summary.     JESSICA Stevens   Inpatient Case Management (ICM)   [facility]

## 2025-06-30 NOTE — DISCHARGE SUMMARY
Discharge Summary    Patient: No Alberts MRN: [id number]  CSN: [id number]    YOB: 1935  Age: 89 y.o.  Sex: female    DOA: 6/23/2025 LOS:  LOS: 7 days   Discharge Date:  6/30/2025      Admission Diagnosis: Pneumonia due to human metapneumovirus [J12.3]  Acute respiratory failure with hypoxia and hypercarbia (HCC) [J96.01, J96.02]    Discharge Diagnosis:  Sepsis  Acute respiratory failure with hypoxia and hypercarbia  Aspiration pneumonia  COPD  Human metapneumovirus infection  Severe aortic stenosis  Pericardial effusion  Acute metabolic encephalopathy  Type 2 diabetes mellitus  Hypertension  Hyperlipidemia  Chronic HFpEF  Hypothyroidism  Hyponatremia  Hypokalemia  End-of-life care    Discharge Condition: Stable    Discharge Disposition: Hospice    PHYSICAL EXAM    Visit Vitals  /68   Pulse 72   Temp 97.2 °F (36.2 °C) (Oral)   Resp 18   Ht 1.549 m (5' 1\")   Wt 67.1 kg (148 lb)   SpO2 94%   BMI 27.96 kg/m²     Hospital Course By Problem:   Patient presented to the emergency room secondary to AMS fever hypoxia.  In the emergency room patient found to be hypoxic hypercapnic and acidotic, and tested positive for human metapneumovirus.  Patient was admitted and subsequently had a rapid response due to hypotension.  ICU was consulted along with infectious disease and cardiology.  Patient overall was requiring fair amount of supplemental oxygen via high flow nasal cannula.  During this time period, palliative medicine was consulted and assisted with determining the ultimate goals of care.  Ultimately patient and family decided on transitioning patient to hospice care once patient was ready for discharge.  Patient continue to wean oxygen until she was adamant acceptable rate that could be supported outpatient if needed and patient subsequently was able to discharge to home with hospice    Consults: Pulmonology/PCCM, cardiology, palliative, infectious disease,

## 2025-06-30 NOTE — CARE COORDINATION
Requested Case Management specialist to assist with transportation to [facility].  Address is  [address], [place] [address]  and phone number is  [phone]  Any steps at the residence? No  Patient will require BLS transport.   Pt requires Stretcher If stretcher, reason: Hospice  Patient is currently requiring oxygen Yes 1.5L  Height:5'1   Weight: 148  Pt is on isolation: Yes Isolation is for: MRSA, Human Metapneumovirus   Is the pt ready now? No  Requested time: 1300  PCS Faxed: No  Insurance verified on face sheet: Yes  Auth needed for transport: ANNEMARIE advised that [employer] IS THE TRANSPORT COMPANY and the CODE is [id number]      JESSICA Stevens   Inpatient Case Management (ICM)   [facility]

## 2025-06-30 NOTE — CARE COORDINATION
Elias Gilmer [phone]  Marissa Escobar confirmed that [facility] will accept patient back at [facility] for hospice. [facility] will  set up their own End of Life care.        [facility] confirmed [employer] is the transportation company with code [id number] to be provided.     [facility] requested ICM Send DC summary to [facility] and to [facility] SERGEI Pate fax is [phone]      Radha with [facility] is agreeable to return.       Daughter Oralia [phone] is aware and agreeable as well.     JESSICA Stevens   Inpatient Case Management (ICM)   [facility]

## 2025-06-30 NOTE — PROGRESS NOTES
In Patient Progress note      Admit Date: 6/23/2025      Impression:     1) Hypernatremia d/t free water deficit/improving with IVF   2) respiratory failure d/t COPD exacerbation/PNA  3) Pericardial effusion   4) Hypothyroidism   5) hypokalemia   6) poor functional status   7) Klebsiella UTI.     Recommendations:   free water deficit d/t thickened liquids  D/c IVF   Monitor electrolytes     Will sign off      Please call with questions     Gil Vasquez MD FASN  Cell [phone]  Pager: [phone]  Fax   [phone]       Subjective:     - No acute over night events.  - respiratory - stable  - hemodynamics - stable, no pressrs  - UOP-  - Nutrition -    Objective:     BP (!) 142/58   Pulse 54   Temp 97.7 °F (36.5 °C) (Rectal)   Resp 18   Ht 1.549 m (5' 1\")   Wt 67.1 kg (148 lb)   SpO2 97%   BMI 27.96 kg/m²       Intake/Output Summary (Last 24 hours) at 6/28/2025 1351  Last data filed at 6/28/2025 0800  Gross per 24 hour   Intake 2283.74 ml   Output 510 ml   Net 1773.74 ml       Physical Exam:     Gen NAD  HENT mmm  RS AEBE   CVS s1s2 wnl no JVD  Ext edema +  Skin no rashes  Neuro     Data Review:    Recent Labs     06/28/25  0107   WBC 8.8   RBC 4.24   HCT 37.9   MCV 89.4   MCH 26.4   MCHC 29.6*   RDW 14.9*   MPV 10.5     Recent Labs     06/26/25  0337 06/27/25  0030 06/27/25  1731 06/28/25  0107   BUN 33* 27*  --  25*   K 3.2* 2.9* 4.2 4.4   * 147*  --  143   * 108*  --  106   CO2 28 30  --  23       Gil Vasquez MD  
  [facility]  Progress Note    Patient: No Alberts Age: 89 y.o. : 1935 MR#: [id number] SSN: [id number]  Date: 2025         Assessment/Plan:     Hospital Problems           Last Modified POA    * (Principal) Pneumonia due to human metapneumovirus 2025 Yes    Acute hypoxemic respiratory failure (HCC) 2025 Yes    Encounter for palliative care 2025 Yes    Goals of care, counseling/discussion 2025 Yes    Sepsis (HCC) 2025 Yes    Moderate Alzheimer's dementia (HCC) 2025 Yes    Advanced age 2025 Yes    Acute respiratory failure with hypoxia and hypercarbia (HCC) 2025 Yes    Aspiration pneumonia (HCC) 2025 Yes    COPD exacerbation ([facility]) 2025 Yes    MRSA (methicillin resistant staph aureus) culture positive 2025 Yes     Sepsis, POA  In setting of  tachypnea, tachycardia secondary to bilateral pneumonia and human metapneumovirus  Acute respiratory failure with hypoxia and hypercapnia  Aspiration pneumonia  COPD history  - Bilateral pneumonia as seen on imaging.  Also with human metapneumovirus, continue droplet and contact precautions  - On nasal cannula, wean as tolerated goal greater than 88%.  - Pulmonology consulted, appreciate reccs  - Continue antibiotics per ID   - MRSA screen positive. Legionella/strep urinary antigens negative   - Pulmonary hygiene, ICS, antitussives  - Appreciate palliative care assistance with goals of care, recs reviewed  - SLP following, continue diet as per their recs     Severe aortic stenosis  Pericardial effusion  - As seen on TTE  - Cardiology following, appreciate reccs  - No further intervention given multiple medical comorbidities, advanced age  - Supportive care      Acute metabolic encephalopathy, suspect secondary to above however improving  - Monitor neuro status   - SLP eval, dysphagia diet     Type 2 diabetes mellitus  - SSI, Accu-Cheks monitor adjust 
  [facility]  Progress Note    Patient: No Alberts Age: 89 y.o. : 1935 MR#: [id number] SSN: [id number]  Date: 2025         Assessment/Plan:     Hospital Problems           Last Modified POA    * (Principal) Pneumonia due to human metapneumovirus 2025 Yes    Acute hypoxemic respiratory failure (HCC) 2025 Yes    Encounter for palliative care 2025 Yes    Goals of care, counseling/discussion 2025 Yes    Sepsis (HCC) 2025 Yes    Moderate Alzheimer's dementia (HCC) 2025 Yes    Advanced age 2025 Yes    Acute respiratory failure with hypoxia and hypercarbia (HCC) 2025 Yes    Aspiration pneumonia (HCC) 2025 Yes    COPD exacerbation ([facility]) 2025 Yes    MRSA (methicillin resistant staph aureus) culture positive 2025 Yes     Sepsis, POA  In setting of  tachypnea, tachycardia secondary to bilateral pneumonia and human metapneumovirus  Acute respiratory failure with hypoxia and hypercapnia  Aspiration pneumonia  COPD history  - Bilateral pneumonia as seen on imaging.  Also with human metapneumovirus, continue droplet and contact precautions  - On nasal cannula, wean as tolerated goal greater than 88%.  - Pulmonology consulted, appreciate reccs  - Continue antibiotics per ID   - MRSA screen positive. Legionella/strep urinary antigens negative   - Pulmonary hygiene, ICS, antitussives  - Appreciate palliative care assistance with goals of care, recs reviewed  - SLP following, continue diet as per their recs     Severe aortic stenosis  Pericardial effusion  - As seen on TTE  - Cardiology following, appreciate reccs  - No further intervention given multiple medical comorbidities, advanced age  - Supportive care      Acute metabolic encephalopathy, suspect secondary to above however improving  - Monitor neuro status   - SLP eval, dysphagia diet     Type 2 diabetes mellitus  - SSI, Accu-Cheks monitor adjust 
  Pharmacy Note     Zosyn 3.375 gm now ordered for treatment of Nosocomial PNA. Per [facility] Policy, Zosyn will be changed to 4.5 gm NOW x 1 dose.     Estimated Creatinine Clearance: CrCl cannot be calculated (Patient's most recent lab result is older than the maximum 30 days allowed.).  Dialysis Status, MARGARITA, CKD: -    BMI:  There is no height or weight on file to calculate BMI.    Rationale for Adjustment:  [facility] B-Lactam extended infusion policy    Pharmacy will continue to monitor and adjust dose as necessary.      Please call with any questions.    Thank you,  ADARSH SEBASTIAN, [facility]  
  Physician Progress Note      PATIENT:               GAGE FELIZ  CSN #:                  [id number]  :                       1935  ADMIT DATE:       2025 12:04 PM  DISCH DATE:  RESPONDING  PROVIDER #:        Nathaniel Ronquillo DO          QUERY TEXT:    Sepsis is documented in the medical record H&P and subsequent PNs. Please   provide additional clinical indicators supportive of your documentation. Or   please document if the diagnosis of sepsis has been ruled out after study.    The clinical indicators include:  - WBC 10.7 on admission with range to 7.2  - Temp 97.4 on arrival with range to 98.2 during admission  - H&P notes: Sepsis  The patient had tachypnea and a heart rate greater than 90 bpm on admission.    Organ dysfunction was present in the form of acute respiratory failure and   acute encephalopathy.  Sepsis was present on admission and was secondary to   bilateral pneumonia and human metapneumovirus infection.  Aspiration pneumonia is also a concern.  Options provided:  -- Sepsis present as evidenced by, Please document evidence.  -- Sepsis was ruled out after study  -- Other - I will add my own diagnosis  -- Disagree - Not applicable / Not valid  -- Disagree - Clinically unable to determine / Unknown  -- Refer to Clinical Documentation Reviewer    PROVIDER RESPONSE TEXT:    Sepsis was ruled out after study.    Query created by: Cathy Purcell on 2025 12:05 PM      Electronically signed by:  Nathaniel Ronquillo DO 2025 2:43 PM          
12:30pm-Patient into room 409 from ICU.She is awake and alert, does not answer orientation questions but when asked is she ok she states\"yes, I think so\" vital signs stable, skin check complete with Maxi NUNEZ RN.Incontinent care done at this time, patient turned/repositioned for comfort.  Report received from BROOKLYNN Monique in ICU.  
ABG done and results given to Dr Hardin. Orders to start patient on high flow.   
Advance Care Planning   Healthcare Decision Maker:    Primary Decision Maker: Oralia Tolentino - Child - [phone]    Today we documented Decision Maker(s) consistent with ACP documents on file.     Spiritual Health History and Assessment/Progress Note  [facility]    (P) Palliative Care,  ,  ,      Name: No Alberts MRN: [id number]    Age: 89 y.o.     Sex: female   Language: English   Yazidi: Judaism   Pneumonia due to human metapneumovirus     Date: 6/27/2025            Total Time Calculated: (P) 7 min              Spiritual Assessment began in [facility] 3 INTENSIVE CARE UNIT        Referral/Consult From: (P) Multi-disciplinary team   Encounter Overview/Reason: (P) Palliative Care  Service Provided For: (P) Patient    Kailyn, Belief, Meaning:   Patient has beliefs or practices that help with coping during difficult times  Family/Friends No family/friends present      Importance and Influence:  Patient unable to assess at this time  Family/Friends No family/friends present    Community:  Patient is connected with a spiritual community  Family/Friends No family/friends present    Assessment and Plan of Care:     Patient Interventions include: Other: unavailable  Family/Friends Interventions include: No family/friends present    Patient Plan of Care: Spiritual Care available upon further referral  Family/Friends Plan of Care: No family/friends present    Electronically signed by CARLOS MANUEL Caban on 6/27/2025 at 5:27 PM        
All OTC Fish Oil (Omega-3 Fatty Acids 1000 mg Caps) are NOT to be continued while the patient is in the hospital per P&T approved substitution protocol.  ADARSH SEBASTIAN RPH      
Attempted to get patient twice for cat scan, Per transport patient not ready.   
Comprehensive Nutrition Assessment    Type and Reason for Visit:  Initial, Positive nutrition screen    Nutrition Recommendations/Plan:   Continue current diet as tolerated per SLP recs.  Encourage PO intake. Provide 1:1 assist with meals if needed.   Order/trial Magic Cup (each provides 290 kcal, 9g protein) BID.  Daily wts.   Continue to monitor tolerance of PO, compliance of oral supplements, weight, labs, and plan of care during admission.         Malnutrition Assessment:  Malnutrition Status:  Insufficient data (06/26/25 0832)    Context:  Chronic Illness       Nutrition History and Allergies:      Past Medical History:   Diagnosis Date    Alzheimer's dementia (HCC)     Arthritis     osteo    Diabetes (HCC)     GERD (gastroesophageal reflux disease)     Heart failure (HCC)     history of    Hypercholesteremia     Hypertension     Ill-defined condition     Kidney stones     Morbid obesity (HCC)     Psychiatric disorder     anxiety/depression    Sleep apnea     uses a CPAP- instructed to bring in DOS    Thyroid disease      Weight Hx: Wt appears stable x 1 year  Wt Readings from Last 10 Encounters:   06/25/25 67.1 kg (148 lb)   06/14/24 68 kg (150 lb)     NFPE: unable to perform NFPE. Food Allergies: NKFA    Nutrition Assessment:    Admitted from SNF for AMS, hypoxia and fever. Hx dementia. Pt seen for - Positive Nutrition Screen: PI. Pt has been NPO since admit x 3 days. SLP following, initially rec'd NPO, last eval 6/26- rec'd puree, moderately thick liquids. Will add oral supplements to increase calorie/protein intake opportunity. Per Palliative Care: pt DNR/DNI, no escalation of care, continue IVAB, no increase in high flow. Dtr does not wish for any feeding tubes. Hospice at discharge.    Nutrition Related Findings:    Pertinent Meds:  Lovenox  Solumedrol  Oral meds held Pertinent Labs:  Recent Labs     06/24/25  0301 06/25/25  0139 06/26/25  0337   GLUCOSE 107 144* 197*   BUN 29* 28* 33*   CREATININE 0.77 
Consult noted for hypernatremia   Patient with respiratory failure d/t COPD exacerbation  Pericardial effusion    On HFNC  Sed rate 100  Hypothyroidism   Hemodynamically stable   Plans for conservative management noted  Likely patient has free water deficit d/t thickened liquids  Gentle d5w + 20 kcl @ 50 cc/hrs        
In chart, assist with pt care  
Infectious Disease Progress Note        Reason: Sepsis, acute hypoxic respiratory failure    Current abx Prior abx   Pip/tazo since 6/23/25  Levofloxacin since 6/25 vancomycin, azithromycin 6/23-6/25     Lines:       Assessment :  89-year-old female with dementia, COPD, hypothyroidism, hyperlipidemia, hypertension, diabetes mellitus, chronic heart failure with preserved ejection fraction who was brought to the ED from her SNF on 6/23/25 because of altered mental status, hypoxia and fever.    Clinical presentation consistent with sepsis, acute hypoxic respiratory failure-present on admission likely due to aspiration pneumonia/community-acquired pneumonia superimposed on recent metapneumovirus infection    Probable UTI-bladder wall thickening noted on CT scan along with significant pyuria concerning for UTI. Urine culture >100,000 klebsiella with pip/tazo VICENTE 64    + Uric acid crystals noted on UA-monitor for gout    Improved mentation.  Remains on high flow oxygen.    Severe aortic stenosis noted on echo 6/20/2025.  Cardiology evaluation appreciated.  Patient is not a candidate for aggressive management.    Ongoing discussions about goals of care    + ve MRSA swab 6/26/25    Improved mentation. Gradually improving oxygenation.       Recommendations:    D/c piperacillin/tazobactam,  levofloxacin.  Start po levofloxacin, augmentin till 7/2  Follow-up cardiology recommendation regarding aortic stenosis  Follow-up nephrology recommendations  oxygen as tolerated  D/c planning per primary team     Above plan was discussed in details with daughter at bedside, dr. Tim Please call me if any further questions or concerns. Will continue to participate in the care of this patient.  HPI:     Does not answer questions asked.  Detailed review of system not feasible    Past Medical History:   Diagnosis Date    Alzheimer's dementia (HCC)     Arthritis     osteo    Diabetes (HCC)     GERD (gastroesophageal reflux disease)     Heart 
Infectious Disease progress Note        Reason: Sepsis, acute hypoxic respiratory failure    Current abx Prior abx   Pip/tazo since 6/23/25  Levofloxacin since 6/25 vancomycin, azithromycin 6/23-6/25     Lines:       Assessment :  89-year-old female with dementia, COPD, hypothyroidism, hyperlipidemia, hypertension, diabetes mellitus, chronic heart failure with preserved ejection fraction who was brought to the ED from her SNF on 6/23/25 because of altered mental status, hypoxia and fever.    Clinical presentation consistent with sepsis, acute hypoxic respiratory failure-present on admission likely due to aspiration pneumonia/community-acquired pneumonia superimposed on recent metapneumovirus infection    Probable UTI-bladder wall thickening noted on CT scan along with significant pyuria concerning for UTI. Urine culture >100,000 klebsiella with pip/tazo VICENTE 64    + Uric acid crystals noted on UA-monitor for gout    Improved mentation.  Remains on high flow oxygen.    Severe aortic stenosis noted on echo 6/20/2025.  Cardiology evaluation appreciated.  Patient is not a candidate for aggressive management.    Ongoing discussions about goals of care    + ve MRSA swab 6/26/25    Improved mentation. Gradually improving oxygenation.   Plans for no escalation of care noted    Recommendations:    Continue piperacillin/tazobactam,  levofloxacin.  Will hold off on adding MRSA coverage since patient improving on current antibiotics  Follow-up cardiology recommendation regarding aortic stenosis  Follow-up nephrology recommendations  oxygen as tolerated  Follow-up palliative care recommendation regarding goals of care- ? Transition to hospice     Above plan was discussed in details with dr Ronquillo. Please call me if any further questions or concerns. Will continue to participate in the care of this patient.  HPI:    Feels better. States that she wants to go home.  Does not answer questions asked.  Detailed review of system not 
Infectious Disease progress Note        Reason: Sepsis, acute hypoxic respiratory failure    Current abx Prior abx   Pip/tazo, vancomycin, azithromycin since 6/23/25      Lines:       Assessment :  89-year-old female with dementia, COPD, hypothyroidism, hyperlipidemia, hypertension, diabetes mellitus, chronic heart failure with preserved ejection fraction who was brought to the ED from her SNF on 6/23/25 because of altered mental status, hypoxia and fever.    Clinical presentation consistent with sepsis, acute hypoxic respiratory failure-present on admission likely due to aspiration pneumonia/community-acquired pneumonia superimposed on recent metapneumovirus infection    Probable UTI-bladder wall thickening noted on CT scan along with significant pyuria concerning for UTI. Urine culture >100,000 klebsiella with pip/tazo VICENTE 64    + Uric acid crystals noted on UA-monitor for gout    Improved mentation.  Remains on high flow oxygen.    Severe aortic stenosis noted on echo 6/20/2025.  Cardiology evaluation appreciated.  Patient is not a candidate for aggressive management.    Ongoing discussions about goals of care    Antibiotic management highly complicated due to inability to obtain sputum cultures, MRSA swab not sent despite multiple orders    Recommendations:    Continue piperacillin/tazobactam, discontinue vancomycin, azithromycin.  Start levofloxacin  Obtain nasal MRSA swab-not obtained despite 2 orders on 6/24  Obtain sputum cultures  Follow-up cardiology recommendation regarding aortic stenosis  Follow-up susceptibility of Klebsiella and urine cultures  Follow-up pulmonary recommendations.  Wean oxygen as tolerated  Follow-up palliative care recommendation regarding goals of care-patient is at high risk for clinical deterioration.      Above plan was discussed in details with dr Ronquillo. Please call me if any further questions or concerns. Will continue to participate in the care of this patient.  HPI:    \" I 
[facility]   Pharmacy Pharmacokinetic Monitoring Service - Vancomycin    Indication: sepsis  Goal AUC/VICENTE: 400-600  Day of Therapy: 1  Additional Antimicrobials: pip-tazo, azithro    Pertinent Laboratory Values:   Wt Readings from Last 1 Encounters:   06/23/25 67.5 kg (148 lb 14.4 oz)     Temp Readings from Last 1 Encounters:   06/24/25 98.2 °F (36.8 °C) (Axillary)     Estimated Creatinine Clearance: 44 mL/min (based on SCr of 0.77 mg/dL).    Recent Labs     06/23/25  1219 06/23/25  1312 06/24/25  0301   CREATININE 0.78 0.82 0.77   BUN 32*  --  29*   WBC 10.7  --  8.5     Pertinent Cultures:  Date Source Results   6/23 blood NGTD   6/23 urine pending   MRSA Nasal Swab: ordered    Assessment:  Date Current Dose Level (mg/L) Timing of Level (h) AUC/VICENTE   6/24 1,500 mg x1 - - -   Note: Serum concentrations collected for AUC dosing may appear elevated if collected in close proximity to the dose administered, this is not necessarily an indication of toxicity    Plan:  Administer 1,500 mg x1 then start a dose of 1,000 mg q24h  No level ordered at this time  Pharmacy will continue to monitor patient and adjust therapy as indicated    Thank you for the consult,  David Marte RPH  6/24/2025  
[facility]   Pharmacy Pharmacokinetic Monitoring Service - Vancomycin    Indication: sepsis  Goal AUC/VICENTE: 400-600  Day of Therapy: 2  Additional Antimicrobials: pip-tazo, azithro    Pertinent Laboratory Values:   Wt Readings from Last 1 Encounters:   06/25/25 67.1 kg (148 lb)     Temp Readings from Last 1 Encounters:   06/25/25 97.2 °F (36.2 °C) (Axillary)     Estimated Creatinine Clearance: 43 mL/min (based on SCr of 0.77 mg/dL).    Recent Labs     06/24/25  0301 06/25/25  0139   CREATININE 0.77 0.77   BUN 29* 28*   WBC 8.5 8.0     Pertinent Cultures:  Date Source Results   6/23 blood NGTD   6/23 urine >100,000 GNR   MRSA Nasal Swab: ordered    Assessment:  Date Current Dose Level (mg/L) Timing of Level (h) AUC/VICENTE   6/24 1,500 mg x1 - - -   6/25 1,000 mg q24h - - -   Note: Serum concentrations collected for AUC dosing may appear elevated if collected in close proximity to the dose administered, this is not necessarily an indication of toxicity    Plan:  Continue current dose of 1,000 mg q24h  Ordered a level for 6/26 with AM labs  Pharmacy will continue to monitor patient and adjust therapy as indicated    Thank you for the consult,  David Marte RPH  6/25/2025  
[facility]   Pharmacy Pharmacokinetic Monitoring Service - Vancomycin    Indication: Pneumonia (HAP)  Target Concentration: Goal AUC/VICENTE 400-600 mg*hr/L  Day of Therapy: 1  Additional Antimicrobials: Piperacillin/Tazobactam, Azithromycin    Pertinent Laboratory Values:   Temp: 97.4 °F (36.3 °C), Weight - Scale: 67.5 kg (148 lb 14.4 oz)  Recent Labs     06/23/25  1219 06/23/25  1312   CREATININE 0.78 0.82   BUN 32*  --    WBC 10.7  --    PROCAL 0.21  --        Estimated Creatinine Clearance: 41 mL/min (based on SCr of 0.82 mg/dL).    Pertinent Cultures:  Culture Date Source Results   6/23 blood pending   MRSA Nasal Swab: Not ordered. Order placed by pharmacy    Assessment:  Date/Time Current Dose Concentration Timing of Concentration (h) AUC   6/23 1,500mg - - -     Note: Serum concentrations collected for AUC dosing may appear elevated if collected in close proximity to the dose administered, this is not necessarily an indication of toxicity    Plan:  Vancomycin 1,500mg x1 followed by 1,250mg q24h  Projected AUCss/T = 511/15.2  Renal labs as indicated   Vancomycin concentration ordered for AM labs tomorrow  Pharmacy will continue to monitor patient and adjust therapy as indicated    Thank you for the consult,  JESUS BLANKENSHIP RPH  6/23/2025    
[facility] Hospitalist Group  Progress Note    Patient: No Alberts Age: 89 y.o. : 1935 MR#: [id number] SSN: [id number]  Date/Time: 2025     Chief Complaint   Patient presents with    Shortness of Breath    Fever     Subjective:   Patient seen and examined.  No family at bedside.  Patient talkative reports that she feels she is doing better.  Reviewed echo, discussed with pulmonology, cardiology and palliative care. Denies chest pain, abdominal pain, shortness of breath.    Assessment/Plan:   Sepsis, POA with tachypnea, tachycardia secondary to bilateral pneumonia and human metapneumovirus  Acute respiratory failure with hypoxia and hypercapnia  Aspiration pneumonia  COPD history  - Bilateral pneumonia as seen on imaging.  Also with human metapneumovirus, continue droplet and contact precautions  On high flow nasal cannula, wean as tolerated goal greater than 88%.  -Pulmonology consulted, appreciate assistance in care  -Continue antibiotics, ID following.  Will defer antibiotic management to their guidance  Follow-up MRSA screen.  Legionella/strep urinary antigens negative.  Pulmonary hygiene, ICS, antitussives  -Appreciate palliative care assistance with goals of care  - Follow-up respiratory culture.  Continue to follow cultures.    Severe aortic stenosis  - As seen on TTE.  Discussed with cardiology.  Appreciate assistance in care.  No further intervention given multiple medical comorbidities, advanced age.    Acute metabolic encephalopathy, suspect secondary to above however improving.  SLP eval  Type 2 diabetes mellitus-SSI, Accu-Cheks monitor adjust accordingly  Hypertension-Home meds on hold, monitor and adjust accordingly  Dyslipidemia-statin  Chronic heart failure with preserved ejection fraction, not in active exacerbation.  TTE reviewed.  Hypothyroidism-Synthroid.  Reported to remain p.o. for prolonged.  Will consider transitioning to IV.  P.o. Synthroid does 
[facility] Hospitalist Group  Progress Note    Patient: No Alberts Age: 89 y.o. : 1935 MR#: [id number] SSN: [id number]  Date/Time: 2025     Chief Complaint   Patient presents with    Shortness of Breath    Fever     Subjective:   Patient seen and examined.  No family at bedside.  Patient talkative reports that she feels she is doing better.   Denies chest pain, abdominal pain, shortness of breath.    Assessment/Plan:   Sepsis, POA with tachypnea, tachycardia secondary to bilateral pneumonia and human metapneumovirus  Acute respiratory failure with hypoxia and hypercapnia  Aspiration pneumonia  COPD history  - Bilateral pneumonia as seen on imaging.  Also with human metapneumovirus, continue droplet and contact precautions  On high flow nasal cannula, wean as tolerated goal greater than 88%.  -Pulmonology consulted, appreciate assistance in care  -Continue antibiotics, ID following.  Will defer antibiotic management to their guidance  Follow-up MRSA screen. Legionella/strep urinary antigens negative.  Pulmonary hygiene, ICS, antitussives  -Appreciate palliative care assistance with goals of care, recs reviewed.   - Follow-up respiratory culture.  Continue to follow cultures.  - SLP following, continue diet as per their recs.    Severe aortic stenosis  Pericardial effusion  - As seen on TTE.  Discussed with cardiology.  Appreciate assistance in care.  No further intervention given multiple medical comorbidities, advanced age.    Acute metabolic encephalopathy, suspect secondary to above however improving.  SLP eval  Type 2 diabetes mellitus-SSI, Accu-Cheks monitor adjust accordingly.   Hypertension-Home meds on hold, monitor and adjust accordingly  Dyslipidemia-statin  Chronic heart failure with preserved ejection fraction, not in active exacerbation.  TTE reviewed.  Hypothyroidism-Synthroid.    Hypernatremia-nephrology following, appreciate assistance in 
[facility]  Progress Note    Patient: No Alberts Age: 89 y.o. : 1935 MR#: [id number] SSN: [id number]  Date/Time: 2025     Chief Complaint   Patient presents with    Shortness of Breath    Fever     Subjective:   Patient seen and examined.  No family at bedside.  Patient reports that she feels she is doing better.   Denies chest pain, abdominal pain, shortness of breath.    Assessment/Plan:   Sepsis, POA with tachypnea, tachycardia secondary to bilateral pneumonia and human metapneumovirus  Acute respiratory failure with hypoxia and hypercapnia  Aspiration pneumonia  COPD history  - Bilateral pneumonia as seen on imaging.  Also with human metapneumovirus, continue droplet and contact precautions  On nasal cannula, wean as tolerated goal greater than 88%.  -Pulmonology consulted, appreciate assistance in care  -Continue antibiotics, ID following.  Will defer antibiotic management to their guidance  -MRSA screen positive. Legionella/strep urinary antigens negative.  Pulmonary hygiene, ICS, antitussives  -Appreciate palliative care assistance with goals of care, recs reviewed.   - Follow-up respiratory culture.  Continue to follow cultures.  - SLP following, continue diet as per their recs.    Severe aortic stenosis  Pericardial effusion  - As seen on TTE.  Discussed with cardiology.  Appreciate assistance in care.  No further intervention given multiple medical comorbidities, advanced age.    Acute metabolic encephalopathy, suspect secondary to above however improving.  SLP eval  Type 2 diabetes mellitus-SSI, Accu-Cheks monitor adjust accordingly.   Hypertension-Home meds on hold, monitor and adjust accordingly  Dyslipidemia-statin  Chronic heart failure with preserved ejection fraction, not in active exacerbation.  TTE reviewed.  Hypothyroidism-Synthroid.    Hypernatremia-nephrology following, appreciate assistance in care.  Continue IV 
Medical transport arrived to  pt. Pt discharged in stable condition via stretcher to be taken to [facility].    [facility] Report consisted of SBAR, Questions answered at this time.    This RN called pt's daughter Oralia to inform her that the pt was taken by medical transport to return to [facility]  
Multivitamin 1 tablet was therapeutically interchanged for Daily Vites 1 tablet per the P &T Committee approved Therapeutic Interchanges Policy.  
Patient remains on a Heated High Flow Nasal Cannula. This morning was found on 40L/65%. This morning titrated down to 35L/60% SpO2 97%. Plan is to titrate as tolerated.     Patient continues to tolerate heated high flow cannula at 35L/40%   
RT assisted in transport of patient on oxygen equipment.  Dual verification of equipment completed with Alejandrina RN and Oly RT.    
Review of Systems    Physical Exam  Skin:            Comments: Patient belongings:   Two arm compression sleeves     
Speech Pathology    MBS completed with recs of NPO and consideration of alternate means of nutrition/hydration vs comfort feeds. D/w RN. Full report to follow.     Thank you for this referral.    Nellie Tillman M.S. CCC-SLP  Speech Language Pathologist    
declines despite current efforts will change to comfort. Discussed hospice at d/c she was agreeable. She does not wish for any feeding tubes. Goals of care DNR/DNI no escalation of care, continue IVAB and current medical care, no increase in high flow. Hospice at d/c. Discussed with ICU RN and attending.     Please see below for previous notes per palliative team       June 24, 2025 Patient seen along with Ms Erick RN. She will alert to her name answers a few simple questions. Currently she can not participate in her medical discussions. Her daughter Oralia and LATOSHA later presented to bedside. We introduced our team and purpose for visit.     AMD patient has previously completed an AMD naming her daughter Oralia as MPOA her niece Marline as secondary MPOA. Per daughter Marline has since passed away.     Social patient lives in a nursing facility. She is a  .     Functional level prior to hospitalization patient is w/c bound but is alert and oriented ( per daughter) x 5. Verbal at baseline.     Goals of care we note patient has a previously completed POLST on file which Oralia signed. We also note it is for comfort. Per daughter she was not sure of the reason it was for comfort. Currently she would like to continue current treatment. Discussed current speech findings and NPO status. Per daughter she feels she has improved since last night and would like to see how she is tomorrow. Oralia is aware if patient is still felt to be unsafe to tolerate oral feeds will need to have further discussions. Goals of care affirmed as DNR/DNI continue current medical treatment.   Initial consult note routed to primary continuity provider and/or primary health care team members  Please call with any palliative questions or concerns.  Palliative Care Team is available via perfect serve or via phone.    Referrals to:   [] Outpatient Palliative Care  [] Home Based Palliative Care  [] Home Based Primary Care  [] Hospice       ADVANCE 
findings and NPO status. Per daughter she feels she has improved since last night and would like to see how she is tomorrow. Oralia is aware if patient is still felt to be unsafe to tolerate oral feeds will need to have further discussions. Goals of care affirmed as DNR/DNI continue current medical treatment.   Initial consult note routed to primary continuity provider and/or primary health care team members  Please call with any palliative questions or concerns.  Palliative Care Team is available via perfect serve or via phone.    Referrals to:   [] Outpatient Palliative Care  [] Home Based Palliative Care  [] Home Based Primary Care  [] Hospice       ADVANCE CARE PLANNING:   [] The [facility] Interdisciplinary Team has updated the ACP Navigator with Health Care Decision Maker and Patient Capacity      Primary Decision Maker: Oralia Tolentino - Child - [phone]  Confirm Advance Directive: Yes, on file    Current Code Status: DNR            Please refer to Palliative Medicine ACP notes for further details.    PALLIATIVE ASSESSMENT:      Palliative Performance Scale (PPS):40              Modified ESAS:  Modified-[place] Symptom Assessment Scale (ESAS)  Pain Score: No pain  Dyspnea Score: No shortness of breath    Clinical Pain Assessment (nonverbal scale for severity on nonverbal patients):   Clinical Pain Assessment  Severity: 0       NVPS:  Adult Nonverbal Pain Scale (NVPS)  Face: No particular expression or smile  Activity (Movement): Laid quietly, normal position  Guarding: Lying quietly, no positioning of hands over areas of bod  Physiology (Vital Signs): Stable vital signs  Respiratory: Baseline RR/SpO2 compliant with ventilator  NVPS Score : 0             Vital Signs: Blood pressure (!) 125/49, pulse 72, temperature 98.4 °F (36.9 °C), temperature source Axillary, resp. rate 25, height 1.549 m (5' 1\"), weight 67.1 kg (148 lb), SpO2 97%.    PHYSICAL ASSESSMENT:   General: [] Oriented x3  [] Well appearing  [] 
  Out: 200 [Urine:200]  Last 3 shifts: 06/24 1901 - 06/26 0700  In: 1562   Out: 1800 [Urine:1800]    Intake/Output Summary (Last 24 hours) at 6/26/2025 1114  Last data filed at 6/26/2025 1000  Gross per 24 hour   Intake 855.05 ml   Output 1400 ml   Net -544.95 ml            Telemetry: [x]Sinus []A-flutter []Paced    []A-fib []Multiple PVC’s                  Physical Exam:      General: Awake, alert, no apparent distress;   HEENT:  Anicteric sclerae; pink palpebral conjunctivae; mucosa moist  Resp:  (+)coarse BS noted diffusely  CV:  S1, S2 present; regular rate and rhythm  GI:  Abdomen soft, non-tender; (+) active bowel sounds  Extremities:  +2 pulses on all extremities; no edema/ cyanosis/ clubbing noted   Skin:  Warm; no rashes/ lesions noted, normal turgor/cap refill   Neurologic:  Non-focal  Devices:  No NGT/OGT, Central line/ PICC, ETT/tracheostomy, chest tube      DATA:  MAR reviewed and pertinent medications noted or modified as needed    Labs:    Recent Labs     06/26/25  0337 06/25/25  0139 06/24/25  0301   WBC 7.2 8.0 8.5   HGB 11.0* 11.2* 11.3*   HCT 36.2 38.5 38.4   MCV 88.5 89.5 90.8    190 144      Lab Results   Component Value Date/Time     06/26/2025 03:37 AM    K 3.2 06/26/2025 03:37 AM     06/26/2025 03:37 AM    CO2 28 06/26/2025 03:37 AM    BUN 33 06/26/2025 03:37 AM    CREATININE 0.79 06/26/2025 03:37 AM    GLUCOSE 197 06/26/2025 03:37 AM    CALCIUM 8.6 06/26/2025 03:37 AM       Lab Results   Component Value Date    ALT 5 (L) 06/24/2025    AST 17 06/24/2025    ALKPHOS 51 06/24/2025    BILITOT 0.3 06/24/2025      Lab Results   Component Value Date    DDIMER 0.93 (H) 12/23/2015      Lab Results   Component Value Date    TSH 45.400 (H) 06/24/2025      Hemoglobin A1C   Date Value Ref Range Status   06/24/2025 6.7 (H) 4.2 - 5.6 % Final     Comment:     Reference Range  Normal       <5.7%  Prediabetes  5.7-6.4%  Diabetes     >6.4%        No results found for: \"APTT\"   No results 
   Hemoglobin 11.3 (L) 12.0 - 16.0 g/dL    Hematocrit 38.4 35.0 - 45.0 %    MCV 90.8 78.0 - 100.0 FL    MCH 26.7 24.0 - 34.0 PG    MCHC 29.4 (L) 31.0 - 37.0 g/dL    RDW 15.2 (H) 11.6 - 14.5 %    Platelets 144 135 - 420 K/uL    MPV 10.2 9.2 - 11.8 FL    Nucleated RBCs 0.0 0  WBC    nRBC 0.00 0.00 - 0.01 K/uL    Neutrophils % 65.7 40.0 - 73.0 %    Lymphocytes % 29.8 21.0 - 52.0 %    Monocytes % 3.3 3.0 - 10.0 %    Eosinophils % 0.1 0.0 - 5.0 %    Basophils % 0.4 0.0 - 2.0 %    Immature Granulocytes % 0.7 (H) 0.0 - 0.5 %    Neutrophils Absolute 5.59 1.80 - 8.00 K/UL    Lymphocytes Absolute 2.53 0.90 - 3.60 K/UL    Monocytes Absolute 0.28 0.05 - 1.20 K/UL    Eosinophils Absolute 0.01 0.00 - 0.40 K/UL    Basophils Absolute 0.03 0.00 - 0.10 K/UL    Immature Granulocytes Absolute 0.06 (H) 0.00 - 0.04 K/UL    Differential Type SMEAR SCANNED      Platelet Comment DECREASED PLATELETS      RBC Comment OVALOCYTES  1+       Brain Natriuretic Peptide    Collection Time: 06/24/25  3:01 AM   Result Value Ref Range    NT Pro-BNP 2,882 (H) 36 - 1,800 PG/ML   Magnesium    Collection Time: 06/24/25  3:01 AM   Result Value Ref Range    Magnesium 1.7 1.6 - 2.6 mg/dL   Phosphorus    Collection Time: 06/24/25  3:01 AM   Result Value Ref Range    Phosphorus 2.7 2.5 - 4.9 MG/DL   TSH    Collection Time: 06/24/25  3:01 AM   Result Value Ref Range    TSH, 3rd Generation 45.400 (H) 0.27 - 4.20 uIU/mL   POCT Glucose    Collection Time: 06/24/25  5:59 AM   Result Value Ref Range    POC Glucose 92 70 - 110 mg/dL   POCT Glucose    Collection Time: 06/24/25  8:45 AM   Result Value Ref Range    POC Glucose 97 70 - 110 mg/dL   POCT Glucose    Collection Time: 06/24/25 12:37 PM   Result Value Ref Range    POC Glucose 85 70 - 110 mg/dL     Signed By: Nathaniel Ronquillo DO     June 24, 2025      Disclaimer: Sections of this note are dictated using utilizing voice recognition software.  Minor typographical errors may be present. If questions 
   Order Status: Canceled Specimen: Nares     Blood Culture 2 [[id number]] Collected: 06/23/25 1254    Order Status: Completed Specimen: Blood Updated: 06/27/25 0635     Special Requests NO SPECIAL REQUESTS        Culture NO GROWTH 4 DAYS       Respiratory Panel, Molecular, with COVID-19 (Restricted: peds pts or suitable admitted adults) [[id number]]  (Abnormal) Collected: 06/23/25 1242    Order Status: Completed Specimen: Nasopharyngeal Updated: 06/23/25 1405     Adenovirus by PCR Not detected        Coronavirus 229E by PCR Not detected        Coronavirus HKU1 by PCR Not detected        Coronavirus NL63 by PCR Not detected        Coronavirus OC43 by PCR Not detected        SARS-CoV-2, PCR Not detected        Human Metapneumovirus by PCR Detected        Rhinovirus Enterovirus PCR Not detected        Influenza A by PCR Not detected        Influenza B PCR Not detected        Parainfluenza 1 PCR Not detected        Parainfluenza 2 PCR Not detected        Parainfluenza 3 PCR Not detected        Parainfluenza 4 PCR Not detected        Respiratory Syncytial Virus by PCR Not detected        Bordetella parapertussis by PCR Not detected        Bordetella pertussis by PCR Not detected        Chlamydophila Pneumonia PCR Not detected        Mycoplasma pneumo by PCR Not detected       Blood Culture 1 [[id number]] Collected: 06/23/25 1240    Order Status: Completed Specimen: Blood Updated: 06/27/25 0635     Special Requests NO SPECIAL REQUESTS        Culture NO GROWTH 4 DAYS                    Imaging:  [x]   I have personally reviewed the patient’s radiographs and reports  Most recent imaging:  XR CHEST 1 VIEW  Result Date: 6/25/2025  Bibasilar infiltrates right greater than left with streaky infiltrate right midlung field similar to 6/23/2025. Tiny pleural effusions. Electronically signed by Raul Logan      No results found for this or any previous visit.     No valid procedures specified.              35 minutes were 
[[id number]] Collected: 06/23/25 1711    Order Status: Canceled Specimen: Urine     Strep Pneumoniae Antigen [[id number]] Collected: 06/23/25 1711    Order Status: Canceled Specimen: Urine, clean catch     Culture, MRSA, Screening [[id number]]     Order Status: Canceled Specimen: Nares     Blood Culture 2 [[id number]] Collected: 06/23/25 1254    Order Status: Completed Specimen: Blood Updated: 06/26/25 0656     Special Requests NO SPECIAL REQUESTS        Culture NO GROWTH 3 DAYS       Respiratory Panel, Molecular, with COVID-19 (Restricted: peds pts or suitable admitted adults) [[id number]]  (Abnormal) Collected: 06/23/25 1242    Order Status: Completed Specimen: Nasopharyngeal Updated: 06/23/25 1405     Adenovirus by PCR Not detected        Coronavirus 229E by PCR Not detected        Coronavirus HKU1 by PCR Not detected        Coronavirus NL63 by PCR Not detected        Coronavirus OC43 by PCR Not detected        SARS-CoV-2, PCR Not detected        Human Metapneumovirus by PCR Detected        Rhinovirus Enterovirus PCR Not detected        Influenza A by PCR Not detected        Influenza B PCR Not detected        Parainfluenza 1 PCR Not detected        Parainfluenza 2 PCR Not detected        Parainfluenza 3 PCR Not detected        Parainfluenza 4 PCR Not detected        Respiratory Syncytial Virus by PCR Not detected        Bordetella parapertussis by PCR Not detected        Bordetella pertussis by PCR Not detected        Chlamydophila Pneumonia PCR Not detected        Mycoplasma pneumo by PCR Not detected       Blood Culture 1 [[id number]] Collected: 06/23/25 1240    Order Status: Completed Specimen: Blood Updated: 06/26/25 0656     Special Requests NO SPECIAL REQUESTS        Culture NO GROWTH 3 DAYS                   RADIOLOGY:    All available imaging studies/reports in [facility] for this admission were reviewed          Disclaimer: Sections of this note are dictated utilizing voice recognition software, which 
exaggerated by small lung  volumes. Mediastinal contours are  unremarkable.    High riding right humeral head with hypertrophic spurring of the right  acromioclavicular joint.  Impression: Bibasilar infiltrates right greater than left with streaky infiltrate right  midlung field similar to 6/23/2025.    Tiny pleural effusions.    Electronically signed by Raul Logan  Echo (TTE) complete (PRN contrast/bubble/strain/3D)    Image quality is adequate. Technically difficult study.    Left Ventricle: Normal left ventricular systolic function with a   visually estimated EF of 55 - 60%. Left ventricle size is normal. Normal   wall thickness. Normal wall motion. Grade II diastolic dysfunction with   increased LAP.    Left Atrium: Left atrium is mildly dilated. Left atrial volume index is   mildly increased (35-41 mL/m2) mL/m2. LA Volume Index A/L is 36 mL/m2.    Right Ventricle: Right ventricle size is normal. Normal systolic   function. TAPSE is normal. TAPSE is 2.0 cm.    Tricuspid Valve: Unable to assess RVSP due to inadequate or   insignificant tricuspid regurgitation.    Aortic Valve: Severe stenosis of the aortic valve. AV mean gradient is   42 mmHg. AV area by continuity VTI is 0.6 cm2. AV Mean Gradient is 42   mmHg. AV Peak Velocity is 4.1 m/s. AV Area by Peak Velocity is 0.5 cm2.   LVOT:AV VTI Index is 0.18. LVOT Stroke Volume Index is 37.1 mL/m2.    Pericardium: Moderate (1-2 cm) to large circumferential pericardial   effusion present. Fibrinous tissue around right ventricle noted in   pericardial effusion. Indication of early cardiac tamponade. Evidence   includes right atrium chamber collapse, tricuspid transvalvular velocity   variation of greater than 35% during respiration. There is a possible mass   or pericardial tissure present at inferolateral wall of left ventricle   that is echogenic. Visualized in pericardial effusion in parasternal long   axis three chamber views.                 Care Time:  The

## 2025-06-30 NOTE — DISCHARGE INSTRUCTIONS
DISCHARGE SUMMARY from Nurse    PATIENT INSTRUCTIONS:    After general anesthesia or intravenous sedation, for 24 hours or while taking prescription Narcotics:  Limit your activities  Do not drive and operate hazardous machinery  Do not make important personal or business decisions  Do  not drink alcoholic beverages  If you have not urinated within 8 hours after discharge, please contact your surgeon on call.    Report the following to your surgeon:  Excessive pain, swelling, redness or odor of or around the surgical area  Temperature over 100.5  Nausea and vomiting lasting longer than 4 hours or if unable to take medications  Any signs of decreased circulation or nerve impairment to extremity: change in color, persistent  numbness, tingling, coldness or increase pain  Any questions    What to do at Home:  Recommended activity: activity as tolerated,     If you experience any of the following symptoms Discharging to [facility], please follow up with Dr Booker.    *  Please give a list of your current medications to your Primary Care Provider.    *  Please update this list whenever your medications are discontinued, doses are      changed, or new medications (including over-the-counter products) are added.    *  Please carry medication information at all times in case of emergency situations.    These are general instructions for a healthy lifestyle:    No smoking/ No tobacco products/ Avoid exposure to second hand smoke  Surgeon General's Warning:  Quitting smoking now greatly reduces serious risk to your health.    Obesity, smoking, and sedentary lifestyle greatly increases your risk for illness    A healthy diet, regular physical exercise & weight monitoring are important for maintaining a healthy lifestyle    You may be retaining fluid if you have a history of heart failure or if you experience any of the following symptoms:  Weight gain of 3 pounds or more overnight or 5 pounds in a week,

## (undated) DEVICE — SPONGE GZ W4XL4IN COT 12 PLY TYP VII WVN C FLD DSGN

## (undated) DEVICE — STERILE POLYISOPRENE POWDER-FREE SURGICAL GLOVES: Brand: PROTEXIS

## (undated) DEVICE — REM POLYHESIVE ADULT PATIENT RETURN ELECTRODE: Brand: VALLEYLAB

## (undated) DEVICE — GOWN,AURORA,NONRNF,XL,30/CS: Brand: MEDLINE

## (undated) DEVICE — PROGRAMMER NEUROSTIMULATOR PT FOR URIN CTRL INTERSTIM ICON

## (undated) DEVICE — ANTENNA PT PRGMR EXT DETACH RECHRG DBS LEGEND

## (undated) DEVICE — SOLUTION IRRIG 1500ML 0.9% SOD CHL USP POUR PLAS BTL

## (undated) DEVICE — 3M™ STERI-DRAPE™ INCISE DRAPE 1050 (60CM X 45CM): Brand: STERI-DRAPE™

## (undated) DEVICE — SKIN MARKER,REGULAR TIP WITH RULER AND LABELS: Brand: DEVON

## (undated) DEVICE — MASTISOL ADHESIVE LIQ 2/3ML

## (undated) DEVICE — (D)PREP SKN CHLRAPRP APPL 26ML -- CONVERT TO ITEM 371833

## (undated) DEVICE — DEVON™ KNEE AND BODY STRAP 60" X 3" (1.5 M X 7.6 CM): Brand: DEVON

## (undated) DEVICE — SUTURE VCRL SZ 3-0 L27IN ABSRB UD L26MM SH 1/2 CIR J416H

## (undated) DEVICE — (D)STRIP SKN CLSR 0.5X4IN WHT --

## (undated) DEVICE — SHEET,DRAPE,40X58,STERILE: Brand: MEDLINE

## (undated) DEVICE — DERMABOND SKIN ADH 0.7ML -- DERMABOND ADVANCED 12/BX

## (undated) DEVICE — 1010 S-DRAPE TOWEL DRAPE 10/BX: Brand: STERI-DRAPE™

## (undated) DEVICE — BSHR MAJOR BASIN PACK-LF: Brand: MEDLINE INDUSTRIES, INC.

## (undated) DEVICE — SYR 10ML CTRL LR LCK NSAF LF --

## (undated) DEVICE — INTENDED FOR TISSUE SEPARATION, AND OTHER PROCEDURES THAT REQUIRE A SHARP SURGICAL BLADE TO PUNCTURE OR CUT.: Brand: BARD-PARKER ® CARBON RIB-BACK BLADES

## (undated) DEVICE — LEAD KT INTRO INTERSTIM --

## (undated) DEVICE — SUTURE VCRL SZ 4-0 L27IN ABSRB UD L19MM PS-2 3/8 CIR PRIM J426H

## (undated) DEVICE — DBD-PACK,LAPAROTOMY,2 REINFORCED GOWNS: Brand: MEDLINE

## (undated) DEVICE — 3M™ TEGADERM™ TRANSPARENT FILM DRESSING FRAME STYLE, 1626W, 4 IN X 4-3/4 IN (10 CM X 12 CM), 50/CT 4CT/CASE: Brand: 3M™ TEGADERM™